# Patient Record
Sex: FEMALE | Race: WHITE | Employment: OTHER | URBAN - METROPOLITAN AREA
[De-identification: names, ages, dates, MRNs, and addresses within clinical notes are randomized per-mention and may not be internally consistent; named-entity substitution may affect disease eponyms.]

---

## 2017-01-09 ENCOUNTER — APPOINTMENT (OUTPATIENT)
Dept: MRI IMAGING | Facility: CLINIC | Age: 82
End: 2017-01-09
Attending: EMERGENCY MEDICINE
Payer: MEDICARE

## 2017-01-09 ENCOUNTER — HOSPITAL ENCOUNTER (OUTPATIENT)
Facility: CLINIC | Age: 82
Setting detail: OBSERVATION
Discharge: HOME | End: 2017-01-10
Attending: EMERGENCY MEDICINE | Admitting: INTERNAL MEDICINE
Payer: MEDICARE

## 2017-01-09 DIAGNOSIS — H53.9 VISION CHANGES: ICD-10-CM

## 2017-01-09 DIAGNOSIS — E78.2 MIXED HYPERLIPIDEMIA: Primary | ICD-10-CM

## 2017-01-09 DIAGNOSIS — I63.9 CEREBROVASCULAR ACCIDENT (CVA), UNSPECIFIED MECHANISM (H): ICD-10-CM

## 2017-01-09 LAB
ALBUMIN UR-MCNC: NEGATIVE MG/DL
ANION GAP SERPL CALCULATED.3IONS-SCNC: 6 MMOL/L (ref 3–14)
APPEARANCE UR: CLEAR
BASOPHILS # BLD AUTO: 0 10E9/L (ref 0–0.2)
BASOPHILS NFR BLD AUTO: 0.7 %
BILIRUB UR QL STRIP: NEGATIVE
BUN SERPL-MCNC: 24 MG/DL (ref 7–30)
CALCIUM SERPL-MCNC: 10.4 MG/DL (ref 8.5–10.1)
CHLORIDE SERPL-SCNC: 106 MMOL/L (ref 94–109)
CHOLEST SERPL-MCNC: 221 MG/DL
CO2 SERPL-SCNC: 29 MMOL/L (ref 20–32)
COLOR UR AUTO: YELLOW
CREAT SERPL-MCNC: 1.18 MG/DL (ref 0.52–1.04)
CRP SERPL-MCNC: <2.9 MG/L (ref 0–8)
DIFFERENTIAL METHOD BLD: NORMAL
EOSINOPHIL # BLD AUTO: 0 10E9/L (ref 0–0.7)
EOSINOPHIL NFR BLD AUTO: 0.7 %
ERYTHROCYTE [DISTWIDTH] IN BLOOD BY AUTOMATED COUNT: 13.1 % (ref 10–15)
GFR SERPL CREATININE-BSD FRML MDRD: 43 ML/MIN/1.7M2
GLUCOSE SERPL-MCNC: 96 MG/DL (ref 70–99)
GLUCOSE UR STRIP-MCNC: NEGATIVE MG/DL
HBA1C MFR BLD: 5.5 % (ref 4.3–6)
HCT VFR BLD AUTO: 40.5 % (ref 35–47)
HDLC SERPL-MCNC: 79 MG/DL
HGB BLD-MCNC: 13.6 G/DL (ref 11.7–15.7)
HGB UR QL STRIP: NEGATIVE
IMM GRANULOCYTES # BLD: 0 10E9/L (ref 0–0.4)
IMM GRANULOCYTES NFR BLD: 0.2 %
KETONES UR STRIP-MCNC: NEGATIVE MG/DL
LDLC SERPL CALC-MCNC: 127 MG/DL
LEUKOCYTE ESTERASE UR QL STRIP: NEGATIVE
LYMPHOCYTES # BLD AUTO: 1 10E9/L (ref 0.8–5.3)
LYMPHOCYTES NFR BLD AUTO: 15.6 %
MCH RBC QN AUTO: 32.1 PG (ref 26.5–33)
MCHC RBC AUTO-ENTMCNC: 33.6 G/DL (ref 31.5–36.5)
MCV RBC AUTO: 96 FL (ref 78–100)
MONOCYTES # BLD AUTO: 0.4 10E9/L (ref 0–1.3)
MONOCYTES NFR BLD AUTO: 6.7 %
MUCOUS THREADS #/AREA URNS LPF: PRESENT /LPF
NEUTROPHILS # BLD AUTO: 4.6 10E9/L (ref 1.6–8.3)
NEUTROPHILS NFR BLD AUTO: 76.1 %
NITRATE UR QL: NEGATIVE
NONHDLC SERPL-MCNC: 142 MG/DL
NRBC # BLD AUTO: 0 10*3/UL
NRBC BLD AUTO-RTO: 0 /100
PH UR STRIP: 6 PH (ref 5–7)
PLATELET # BLD AUTO: 249 10E9/L (ref 150–450)
POTASSIUM SERPL-SCNC: 4.4 MMOL/L (ref 3.4–5.3)
RBC # BLD AUTO: 4.24 10E12/L (ref 3.8–5.2)
RBC #/AREA URNS AUTO: 1 /HPF (ref 0–2)
SODIUM SERPL-SCNC: 141 MMOL/L (ref 133–144)
SP GR UR STRIP: 1.01 (ref 1–1.03)
TRIGL SERPL-MCNC: 74 MG/DL
TROPONIN I SERPL-MCNC: NORMAL UG/L (ref 0–0.04)
TSH SERPL DL<=0.005 MIU/L-ACNC: 2.61 MU/L (ref 0.4–4)
URN SPEC COLLECT METH UR: ABNORMAL
UROBILINOGEN UR STRIP-MCNC: NORMAL MG/DL (ref 0–2)
WBC # BLD AUTO: 6.1 10E9/L (ref 4–11)
WBC #/AREA URNS AUTO: <1 /HPF (ref 0–2)

## 2017-01-09 PROCEDURE — 93005 ELECTROCARDIOGRAM TRACING: CPT

## 2017-01-09 PROCEDURE — 27210995 ZZH RX 272: Performed by: EMERGENCY MEDICINE

## 2017-01-09 PROCEDURE — 99285 EMERGENCY DEPT VISIT HI MDM: CPT | Mod: 25

## 2017-01-09 PROCEDURE — 81001 URINALYSIS AUTO W/SCOPE: CPT | Performed by: EMERGENCY MEDICINE

## 2017-01-09 PROCEDURE — 87086 URINE CULTURE/COLONY COUNT: CPT | Performed by: EMERGENCY MEDICINE

## 2017-01-09 PROCEDURE — 36415 COLL VENOUS BLD VENIPUNCTURE: CPT | Performed by: INTERNAL MEDICINE

## 2017-01-09 PROCEDURE — 25500045 ZZH RX 255: Performed by: EMERGENCY MEDICINE

## 2017-01-09 PROCEDURE — 70553 MRI BRAIN STEM W/O & W/DYE: CPT

## 2017-01-09 PROCEDURE — 12000000 ZZH R&B MED SURG/OB

## 2017-01-09 PROCEDURE — 84443 ASSAY THYROID STIM HORMONE: CPT | Performed by: INTERNAL MEDICINE

## 2017-01-09 PROCEDURE — 25000128 H RX IP 250 OP 636: Performed by: INTERNAL MEDICINE

## 2017-01-09 PROCEDURE — 99220 ZZC INITIAL OBSERVATION CARE,LEVL III: CPT | Mod: AI | Performed by: INTERNAL MEDICINE

## 2017-01-09 PROCEDURE — A9585 GADOBUTROL INJECTION: HCPCS | Performed by: EMERGENCY MEDICINE

## 2017-01-09 PROCEDURE — 25000132 ZZH RX MED GY IP 250 OP 250 PS 637: Mod: GY | Performed by: EMERGENCY MEDICINE

## 2017-01-09 PROCEDURE — 86140 C-REACTIVE PROTEIN: CPT | Performed by: INTERNAL MEDICINE

## 2017-01-09 PROCEDURE — 80048 BASIC METABOLIC PNL TOTAL CA: CPT | Performed by: EMERGENCY MEDICINE

## 2017-01-09 PROCEDURE — A9270 NON-COVERED ITEM OR SERVICE: HCPCS | Mod: GY | Performed by: INTERNAL MEDICINE

## 2017-01-09 PROCEDURE — 84484 ASSAY OF TROPONIN QUANT: CPT | Performed by: INTERNAL MEDICINE

## 2017-01-09 PROCEDURE — 70544 MR ANGIOGRAPHY HEAD W/O DYE: CPT | Mod: XS

## 2017-01-09 PROCEDURE — 70549 MR ANGIOGRAPH NECK W/O&W/DYE: CPT

## 2017-01-09 PROCEDURE — A9270 NON-COVERED ITEM OR SERVICE: HCPCS | Mod: GY | Performed by: EMERGENCY MEDICINE

## 2017-01-09 PROCEDURE — 80061 LIPID PANEL: CPT | Performed by: INTERNAL MEDICINE

## 2017-01-09 PROCEDURE — 83036 HEMOGLOBIN GLYCOSYLATED A1C: CPT | Performed by: INTERNAL MEDICINE

## 2017-01-09 PROCEDURE — 25000132 ZZH RX MED GY IP 250 OP 250 PS 637: Mod: GY | Performed by: INTERNAL MEDICINE

## 2017-01-09 PROCEDURE — 85025 COMPLETE CBC W/AUTO DIFF WBC: CPT | Performed by: EMERGENCY MEDICINE

## 2017-01-09 RX ORDER — ASPIRIN 325 MG
325 TABLET ORAL ONCE
Status: COMPLETED | OUTPATIENT
Start: 2017-01-09 | End: 2017-01-09

## 2017-01-09 RX ORDER — SODIUM CHLORIDE 9 MG/ML
INJECTION, SOLUTION INTRAVENOUS CONTINUOUS
Status: DISCONTINUED | OUTPATIENT
Start: 2017-01-09 | End: 2017-01-10 | Stop reason: HOSPADM

## 2017-01-09 RX ORDER — CALCIUM CARBONATE 500(1250)
1 TABLET ORAL DAILY
Status: DISCONTINUED | OUTPATIENT
Start: 2017-01-10 | End: 2017-01-10 | Stop reason: HOSPADM

## 2017-01-09 RX ORDER — CALCIUM CARBONATE 500(1250)
500 TABLET ORAL DAILY
COMMUNITY
End: 2021-01-01 | Stop reason: DRUGHIGH

## 2017-01-09 RX ORDER — ACETAMINOPHEN 325 MG/1
650 TABLET ORAL EVERY 4 HOURS PRN
Status: DISCONTINUED | OUTPATIENT
Start: 2017-01-09 | End: 2017-01-10 | Stop reason: HOSPADM

## 2017-01-09 RX ORDER — ASPIRIN 300 MG/1
300 SUPPOSITORY RECTAL DAILY
Status: DISCONTINUED | OUTPATIENT
Start: 2017-01-10 | End: 2017-01-10

## 2017-01-09 RX ORDER — ONDANSETRON 2 MG/ML
4 INJECTION INTRAMUSCULAR; INTRAVENOUS EVERY 6 HOURS PRN
Status: DISCONTINUED | OUTPATIENT
Start: 2017-01-09 | End: 2017-01-10 | Stop reason: HOSPADM

## 2017-01-09 RX ORDER — AMOXICILLIN 250 MG
1-2 CAPSULE ORAL 2 TIMES DAILY
Status: DISCONTINUED | OUTPATIENT
Start: 2017-01-09 | End: 2017-01-10 | Stop reason: HOSPADM

## 2017-01-09 RX ORDER — ACYCLOVIR 200 MG/1
60 CAPSULE ORAL ONCE
Status: COMPLETED | OUTPATIENT
Start: 2017-01-09 | End: 2017-01-09

## 2017-01-09 RX ORDER — LABETALOL HYDROCHLORIDE 5 MG/ML
10-40 INJECTION, SOLUTION INTRAVENOUS EVERY 10 MIN PRN
Status: DISCONTINUED | OUTPATIENT
Start: 2017-01-09 | End: 2017-01-10 | Stop reason: HOSPADM

## 2017-01-09 RX ORDER — ASCORBIC ACID 500 MG
100 TABLET ORAL DAILY
Status: DISCONTINUED | OUTPATIENT
Start: 2017-01-10 | End: 2017-01-10 | Stop reason: HOSPADM

## 2017-01-09 RX ORDER — ONDANSETRON 4 MG/1
4 TABLET, ORALLY DISINTEGRATING ORAL EVERY 6 HOURS PRN
Status: DISCONTINUED | OUTPATIENT
Start: 2017-01-09 | End: 2017-01-10 | Stop reason: HOSPADM

## 2017-01-09 RX ORDER — POLYETHYLENE GLYCOL 3350 17 G/17G
17 POWDER, FOR SOLUTION ORAL DAILY PRN
Status: DISCONTINUED | OUTPATIENT
Start: 2017-01-09 | End: 2017-01-10 | Stop reason: HOSPADM

## 2017-01-09 RX ORDER — VITAMIN E 268 MG
400 CAPSULE ORAL
Status: DISCONTINUED | OUTPATIENT
Start: 2017-01-09 | End: 2017-01-10 | Stop reason: HOSPADM

## 2017-01-09 RX ORDER — GADOBUTROL 604.72 MG/ML
10 INJECTION INTRAVENOUS ONCE
Status: COMPLETED | OUTPATIENT
Start: 2017-01-09 | End: 2017-01-09

## 2017-01-09 RX ADMIN — SODIUM CHLORIDE 60 ML: 9 INJECTION, SOLUTION INTRAMUSCULAR; INTRAVENOUS; SUBCUTANEOUS at 17:01

## 2017-01-09 RX ADMIN — ASPIRIN 325 MG ORAL TABLET 325 MG: 325 PILL ORAL at 17:03

## 2017-01-09 RX ADMIN — VITAMIN E CAP 400 UNIT 400 UNITS: 400 CAP at 20:26

## 2017-01-09 RX ADMIN — GADOBUTROL 10 ML: 604.72 INJECTION INTRAVENOUS at 17:00

## 2017-01-09 RX ADMIN — PSYLLIUM HUSK 1 PACKET: 3.4 POWDER ORAL at 19:50

## 2017-01-09 RX ADMIN — SENNOSIDES AND DOCUSATE SODIUM 2 TABLET: 8.6; 5 TABLET ORAL at 20:26

## 2017-01-09 RX ADMIN — SODIUM CHLORIDE: 9 INJECTION, SOLUTION INTRAVENOUS at 19:40

## 2017-01-09 ASSESSMENT — VISUAL ACUITY
OU: BLURRED VISION
OU: NORMAL ACUITY

## 2017-01-09 ASSESSMENT — ENCOUNTER SYMPTOMS
WEAKNESS: 1
NUMBNESS: 1

## 2017-01-09 NOTE — IP AVS SNAPSHOT
MRN:[id number]                      After Visit Summary   1/9/2017    Rosie Guerrero    MRN: [id number]           Thank you!     Thank you for choosing [facility] for your care. Our goal is always to provide you with excellent care. Hearing back from our patients is one way we can continue to improve our services. Please take a few minutes to complete the written survey that you may receive in the mail after you visit with us. Thank you!        Patient Information     Date Of Birth          5/25/1928        About your hospital stay     You were admitted on:  January 9, 2017 You last received care in the:  [facility] [address] [facility]    You were discharged on:  January 10, 2017        Reason for your hospital stay       Evaluation of your vision changes and weakness which was found to be secondary to a stroke.                  Who to Call     For medical emergencies, please call 911.  For non-urgent questions about your medical care, please call your primary care provider or clinic, [phone]          Attending Provider     Provider    Nikia Alexis MD Farbakhsh, Kambiz, MD White, Kirsten Elizabeth,        Primary Care Provider Office Phone # Fax #    Drew Wale Hall [phone]       [facility] [address] [address]        After Care Instructions     Activity       Your activity upon discharge: activity as tolerated            Diet       Follow this diet upon discharge: Regular                  Follow-up Appointments     Follow-up and recommended labs and tests        1. Follow up with PCP in 1-2 weeks. No labs needed.            Follow-up and recommended labs and tests        Followup with Neurology in 4 weeks  With Sammi Martinez NP   Tuesday Feb 7 at 1:30pm    [facility]  [address] [place] [address], [address]   [place] [address] [phone]                  Your next 10 appointments already scheduled      Jan 19, 2017 11:45 AM   SHORT with Drew Hall MD   [facility] ([facility])    [address] [address] [phone]              Additional Services     Home Care OT Referral for Hospital Discharge       OT to eval and treat    Your provider has ordered home care - occupational therapy. If you have not been contacted within 2 days of your discharge please call the department phone number listed on the top of this document.            Home Care PT Referral for Hospital Discharge       PT to eval and treat    Your provider has ordered home care - physical therapy. If you have not been contacted within 2 days of your discharge please call the department phone number listed on the top of this document.            Home care nursing referral       RN extended hours visit. RN to assess vital signs and weight, respiratory and cardiac status and home safety.    Your provider has ordered home care nursing services. If you have not been contacted within 2 days of your discharge please call the inpatient department phone number at [phone] .                  Further instructions from your care team       Your doctor has ordered home care to help you after your hospital stay.  They will contact you regarding your first visit.  This service will be provided by [facility].  If you have not received a call within 48 hours of discharge, please call them at [phone].    A hospital follow-up appointment has been scheduled for you.    It is important to go to this appointment--bring this paperwork with you.  At this visit, you should discuss your hospital stay--tell your primary care doctor about how your symptoms are doing, and if there were any medication changes made.  Your doctor will make sure you are still doing OK and update the records at their clinic.       Make sure to see your eye doctor tomorrow or the next day at the latest.   You should be seen in TIA clinic by neurology in the next 1-3 days as well.  If you have any worsening of symptoms please return to the emergency department      Your risk factors for stroke or TIA (transient ischemic attack):    Your Risk Factors Your Results Normal Ranges   High blood pressure BP Readings from Last 1 Encounters:   01/10/17 165/77    Less than 120/80   Cholesterol              Total CHOL      221   1/9/2017   Less than 150    Triglycerides   TRIG       74   1/9/2017 Less than 150   LDL LDL      127   1/9/2017    Less than 70   HDL HDL       79   1/9/2017         Greater than 40 (men)  Greater than 50 (women)   Diabetes   Recent Labs  Lab 01/10/17  0715   GLC 92    Fasting blood glucose    Smoking/tobacco use  Quit smoking and tobacco   Overweight  Lose 1-2 pounds a week   Lack of exercise  30 minutes moderate activity each day   Other risk factors include carotid (neck) artery disease, atrial fibrillation and stress. You may be on new medicine to treat high blood pressure, cholesterol, diabetes or atrial fibrillation.    Understanding Stroke Booklet given to patient. Please refer to booklet for further information.    Stroke warning signs and symptoms - CALL 911 right away for:  - Sudden numbness or weakness in the face, arm or leg (often on one side of the body).  - Sudden confusion or trouble understanding what is going on.  - Sudden blurred or decreased vision in one or both eyes.  - Sudden trouble speaking, loss of balance, dizziness or problems with coordination.  - Sudden, severe headache for no reason.  - Fainting or seizures.  - Symptoms may go away then come back suddenly.      Pending Results     Date and Time Order Name Status Description    1/9/2017 1405 Urine Culture Preliminary             Statement of Approval     Ordered          01/10/17 1132  I have reviewed and agree with all the recommendations and orders detailed in this document.   EFFECTIVE NOW     Approved and  "electronically signed by:  Angelica Briggs DO             Admission Information        Provider Department Dept Phone    2017 Angelica Briggs DO [facility] [phone]      Your Vitals Were     Blood Pressure Temperature Respirations Weight Pulse Oximetry       165/77 mmHg 98.1  F (36.7  C) (Oral) 16 45.7 kg (100 lb 12 oz) 98%       MyChart Information     [facility] lets you send messages to your doctor, view your test results, renew your prescriptions, schedule appointments and more. To sign up, go to [email or web address] . Click on \"Log in\" on the left side of the screen, which will take you to the Welcome page. Then click on \"Sign up Now\" on the right side of the page.     You will be asked to enter the access code listed below, as well as some personal information. Please follow the directions to create your username and password.     Your access code is: [email or web address]  Expires: 2017  2:34 PM     Your access code will  in 90 days. If you need help or a new code, please call your [facility] clinic or [phone].        Care EveryWhere ID     This is your Care EveryWhere ID. This could be used by other organizations to access your [facility] medical records  [id number]           Review of your medicines      START taking        Dose / Directions    aspirin 81 MG EC tablet   Used for:  Cerebrovascular accident (CVA), unspecified mechanism (H)        Dose:  81 mg   Take 1 tablet (81 mg) by mouth daily   Quantity:  90 tablet   Refills:  0         CONTINUE these medicines which have NOT CHANGED        Dose / Directions    ascorbic acid 500 MG tablet   Commonly known as:  VITAMIN C        Take by mouth daily   Quantity:  30 tablet   Refills:  0       calcium carbonate 500 MG tablet   Commonly known as:  OS-PATRICIA 500 mg [place]. Ca        Dose:  500 mg   Take 500 mg by mouth daily   Refills:  0       coenzyme Q-10 10 MG Caps        Quantity:  30 capsule   Refills:  0       DAILY " MULTIPLE VITAMINS Tabs        naturemade mutli + Lutein daily   Refills:  0       FISH -[id number] MG Caps        1 dailoy   Refills:  0       ginkgo biloba 60 MG Caps capsule        Dose:  60 mg   Take 1 capsule (60 mg) by mouth daily   Quantity:  60 capsule   Refills:  0       magnesium chloride 535 (64 MG) MG Tbcr CR tablet        Dose:  535 mg   Take 535 mg by mouth daily   Refills:  0       NATRUL COLON CARE PO        Take by mouth daily   Refills:  0       prune juice Liqd        Dose:  5 mL   Take 5 mLs by mouth daily as needed for constipation   Refills:  0       vitamin B complex with vitamin C Tabs tablet        Dose:  1 tablet   Take 1 tablet by mouth daily   Refills:  0       vitamin D 400 UNITS capsule        1 CAPSULE DAILY   Quantity:  30   Refills:  0       vitamin E 400 UNIT capsule        ONE CAPSULE DAILY   Quantity:  3 MONTHS   Refills:  1 YEAR       Zinc 15 MG Caps        Dose:  15 mg   Take 15 mg by mouth daily   Refills:  0            Where to get your medicines      These medications were sent to [facility] [place]  [place] - [address]  [address] [facility] [address], [facility] [address]     Phone:  [phone]    - aspirin 81 MG EC tablet             Protect others around you: Learn how to safely use, store and throw away your medicines at www.disposemymeds.org.             Medication List: This is a list of all your medications and when to take them. Check marks below indicate your daily home schedule. Keep this list as a reference.      Medications           Morning Afternoon Evening Bedtime As Needed    ascorbic acid 500 MG tablet   Commonly known as:  VITAMIN C   Take by mouth daily   Last time this was given:  500 mg on 1/10/2017  9:03 AM                                   aspirin 81 MG EC tablet   Take 1 tablet (81 mg) by mouth daily   Last time this was given:  325 mg on 1/10/2017  9:03 AM                                   calcium carbonate 500 MG tablet   Commonly  known as:  OS-PATRICIA 500 mg [place]. Ca   Take 500 mg by mouth daily   Last time this was given:  500 mg on 1/10/2017  9:03 AM                                   coenzyme Q-10 10 MG Caps                                DAILY MULTIPLE VITAMINS Tabs   naturemade mutli + Lutein daily                                FISH -[id number] MG Caps   1 dailoy                                ginkgo biloba 60 MG Caps capsule   Take 1 capsule (60 mg) by mouth daily                                magnesium chloride 535 (64 MG) MG Tbcr CR tablet   Take 535 mg by mouth daily   Last time this was given:  535 mg on 1/10/2017  9:03 AM                                NATRUL COLON CARE PO   Take by mouth daily                                prune juice Liqd   Take 5 mLs by mouth daily as needed for constipation                                vitamin B complex with vitamin C Tabs tablet   Take 1 tablet by mouth daily   Last time this was given:  1 tablet on 1/10/2017  9:03 AM                                   vitamin D 400 UNITS capsule   1 CAPSULE DAILY                                vitamin E 400 UNIT capsule   ONE CAPSULE DAILY   Last time this was given:  400 Units on 1/9/2017  8:26 PM                                   Zinc 15 MG Caps   Take 15 mg by mouth daily

## 2017-01-09 NOTE — ED NOTES
[facility]  ED Nurse Handoff Report    ED Chief complaint: Loss of Vision      ED Diagnosis:   Final diagnoses:   Vision changes   Cerebrovascular accident (CVA), unspecified mechanism (H)       Code Status: Full Code    Allergies:   Allergies   Allergen Reactions     Lipitor [Hmg-Coa-R Inhibitors]      Severe leg cramps     Penicillin [Penicillins]      Sulfa Drugs        Activity level:  Stand with Assist     Needed?: No    Isolation: No  Infection: Not Applicable    Bariatric?: No      Vital Signs:   Filed Vitals:    01/09/17 1400 01/09/17 1415 01/09/17 1430 01/09/17 1701   BP: 143/65  142/69 148/70   Temp:       TempSrc:       Resp:       Weight:       SpO2: 97% 97% 98% 97%       Cardiac Rhythm: ,        Pain level: 0-10 Pain Scale: 0    Is this patient confused?: No    Patient Report: Initial Complaint: Loss of right peripheral vision  Focused Assessment: Patient with  History of macular degeneration, had right peripheral vision loss for last few days with some extremities numbness and tingling, as well as left facial tingling. Did not seek any medical helping thinking it would go away.   Tests Performed: Labs, MRI  Abnormal Results: MRI  Treatments provided: Aspirn    Family Comments: Pt here alone.    OBS brochure/video discussed/provided to patient: N/A    ED Medications:   Medications   gadobutrol (GADAVIST) injection 10 mL (10 mLs Intravenous Given 1/9/17 1700)   sodium chloride bacteriostatic 0.9 % flush 60 mL (60 mLs Intravenous Given 1/9/17 1701)   aspirin tablet 325 mg (325 mg Oral Given 1/9/17 1703)       Drips infusing?:  No      ED NURSE PHONE NUMBER: [phone]

## 2017-01-09 NOTE — IP AVS SNAPSHOT
[facility]    [address]    [place] [address]    Phone:  [phone]                                       After Visit Summary   1/9/2017    Rosie Guerrero    MRN: [id number]           After Visit Summary Signature Page     I have received my discharge instructions, and my questions have been answered. I have discussed any challenges I see with this plan with the nurse or doctor.    ..........................................................................................................................................  Patient/Patient Representative Signature      ..........................................................................................................................................  Patient Representative Print Name and Relationship to Patient    ..................................................               ................................................  Date                                            Time    ..........................................................................................................................................  Reviewed by Signature/Title    ...................................................              ..............................................  Date                                                            Time

## 2017-01-09 NOTE — ED NOTES
Bed: ED22  Expected date:   Expected time:   Means of arrival:   Comments:  89 - 29F vision problems eta 1230

## 2017-01-09 NOTE — PHARMACY-ADMISSION MEDICATION HISTORY
Admission medication history interview status for the 1/9/2017  admission is complete. See EPIC admission navigator for prior to admission medications     Medication history source reliability:Good    Actions taken by pharmacist (provider contacted, etc):None     Additional medication history information not noted on PTA med list :None    Medication reconciliation/reorder completed by provider prior to medication history? No    Time spent in this activity: 20min    Prior to Admission medications    Medication Sig Last Dose Taking? Auth Provider   aspirin  MG EC tablet Take 1 tablet (325 mg) by mouth daily  Yes Nikia Alexis MD   calcium carbonate (OS-PATRICIA 500 MG [place]. CA) 500 MG tablet Take 500 mg by mouth daily 1/9/2017 at 1200 Yes Unknown, Entered By History   magnesium chloride 535 (64 MG) MG TBCR CR tablet Take 535 mg by mouth daily 1/9/2017 at 1200 Yes Unknown, Entered By History   Psyllium (NATRUL COLON CARE PO) Take by mouth daily 1/8/2017 at 1800 Yes Unknown, Entered By History   prune juice LIQD Take 5 mLs by mouth daily as needed for constipation 1/8/2017 at Unknown time Yes Unknown, Entered By History   Zinc 15 MG CAPS Take 15 mg by mouth daily 1/8/2017 at 1800 Yes Unknown, Entered By History   vitamin  B complex with vitamin C (VITAMIN  B COMPLEX) TABS Take 1 tablet by mouth daily 1/9/2017 at am Yes Drew Hall MD   coenzyme Q-10 10 MG CAPS  1/8/2017 at 1800 Yes Drew Hall MD   ascorbic acid (VITAMIN C) 500 MG tablet Take by mouth daily 1/9/2017 at am Yes Drew Hall MD   ginkgo biloba 60 MG CAPS Take 1 capsule (60 mg) by mouth daily 1/9/2017 at 1200 Yes Drew Hall MD   DAILY MULTIPLE VITAMINS OR TABS naturemade mutli + Lutein daily 1/9/2017 at am Yes Drew Hall MD   VITAMIN D 400 UNIT OR CAPS 1 CAPSULE DAILY 1/9/2017 at 1200 Yes Drew Hall MD   VITAMIN E 400 UNIT OR CAPS ONE CAPSULE DAILY 1/8/2017 at 1800 Yes Drew Hall MD    FISH -[id number] MG OR CAPS 1 dailoy 1/9/2017 at am Yes Drew Hall MD

## 2017-01-09 NOTE — ED PROVIDER NOTES
History     Chief Complaint:  Loss of Vision    HPI   Rosie Guerrero is a 88 year old female with a history of macular degeneration who presents to the emergency department for evaluation of blurred vision. The patient states she had a loss of vision in her left peripheral field about five days ago. She thought the vision problem would get better, so she waited to present to the ED.  She also complains of some numbness and tingling in her fingers, arms and back, which she has not experienced before. She also notes some generalized weakness in her legs. She lives independently.    Allergies:  Lipitor  Penicillin  Sulfa Drugs    Medications:    Vitamin B  Coenzyme Q-10  Vitamin C  Ginko Biloba  Hydrocortisone  Multivitamins  Calcium-Magnesium-Zinc  Vitamin D  Vitamin E  Potassium Gluconate  Fish Oil     Past Medical History:    Malignant neoplasm of skin of trunk  Macular degeneration  Osteoarthrosis  Rubella without complication  Hyperlipidemia    Past Surgical History:    Hysterectomy, fuad  Removal tonsils/adenoids  Colonoscopy    Family History:    Cerebrovascular disease- mother, father    Social History:  The patient was in the ED alone.  Smoking Status: Former smoker (quit 1974)  Alcohol Use: Occasionally  Marital Status:     The patient lives independently     Review of Systems   Eyes:        Blurred vision   Neurological: Positive for weakness and numbness.   10 point review of systems performed and is negative except as above and in HPI.    Physical Exam   First Vitals:  BP: 140/73 mmHg  Heart Rate: 67  Temp: 98.3  F (36.8  C)  Resp: 14  Weight: 50.349 kg (111 lb)  SpO2: 97 %      Physical Exam  General: Resting on the gurney, appears comfortable  Head:  The scalp, face, and head appear normal  Mouth/Throat: Mucus membranes are moist  CV:  Regular rate    Normal S1 and S2  No pathological murmur   Resp:  Breath sounds clear and equal bilaterally    Non-labored, no retractions or accessory muscle  use    No coarseness    No wheezing   GI:  Abdomen is soft, no rigidity    No tenderness to palpation  MS:  Normal motor assessment of all extremities.    Good capillary refill noted.  Skin:  No rash or lesions noted.  Neuro:  Cranial nerves II through XII intact.    On examination, the patient had slightly blurred vision (equal in both eyes).    Sensation intact.    Normal strength x 4.    Speech is normal and fluent. No apparent deficit.  Psych:  Awake. Alert.  Normal affect.      Appropriate interactions.    Emergency Department Course     Imaging:  Radiographic findings were communicated with the patient who voiced understanding of the findings.    MR Head w/o Contrast Angiogram:  IMPRESSION:  1. Moderate to high-grade short segment stenosis right P1 segment.  2. High-grade stenosis or short segment occlusion of proximal portion  of a right M2 branch.  Per Radiology.    Laboratory:  CBC: WBC 6.1, HGB 13.6,   BMP: Creatinine 1.18(H), GFR 43(L), Calcium 10.4(H)    UA with Microscopic: Mucous present o/w Negative  Urine culture: In process    Emergency Department Course:  Nursing notes and vitals reviewed.  I performed an exam of the patient as documented above.     1427 I spoke with Dr. Herman in Neurology regarding the patient.    Findings and plan explained to the patient. Patient discharged home with instructions regarding supportive care, medications, and reasons to return. The importance of close follow-up was reviewed. The patient was prescribed Aspirin.      Impression & Plan    Medical Decision Making:  Rosie Guerrero is an 88 year old female who presents to the emergency department with a complaint of vision changes. This has been going on long enough that she would be outside of a TPA window for stroke. Therefore, MRI was obtained. MRI showed moderate to high grade stenosis in her right P1 and well as high grade stenosis vs. occlusion in a proximal portion of her right P2. Additionally there are  multiple acute occipital strokes seen on MRI.  The patient was discussed with Dr. Herman and will be admitted to internal medicine in a neurology bed.    Diagnosis:  (H53.9) Vision changes    Disposition:  The patient was discharged home.    Discharge Medications:  New Prescriptions    ASPIRIN  MG EC TABLET    Take 1 tablet (325 mg) by mouth daily     1/9/2017    EMERGENCY DEPARTMENT    I, Meg Mendez, am serving as a scribe at 1349 on January 9, 2017  to document services personally performed by Dr. Alexis based on my observations and the provider's statements to me.      Nikia Alexis MD  01/09/17 7175

## 2017-01-09 NOTE — IP AVS SNAPSHOT
[place] [address] [facility]: [phone]                                              INTERAGENCY TRANSFER FORM - PHYSICIAN ORDERS   2017                    Hospital Admission Date: 2017  COLLEEN DAVIS   : 1928  Sex: Female        Attending Provider: Angelica Briggs DO     Allergies:  Lipitor, Penicillin, Sulfa Drugs    Infection:  None   Service:  HOSPITALIST    Ht:  --   Wt:  45.7 kg (100 lb 12 oz)   Admission Wt:  50.349 kg (111 lb)    BMI:  16.5 kg/m 2   BSA:  1.45 m 2            Patient PCP Information     Provider PCP Type    Drew Hall MD General      ED Clinical Impression     Diagnosis Description Comment Added By Time Added    Vision changes [H53.9] Vision changes [H53.9]  Nikia Alexis MD 2017  4:20 PM    Cerebrovascular accident (CVA), unspecified mechanism (H) [I63.9] Cerebrovascular accident (CVA), unspecified mechanism (H) [I63.9]  Nikia Alexis MD 2017  5:21 PM      Hospital Problems as of 1/10/2017              Priority Class Noted POA    CVA (cerebral vascular accident) (H) Medium  2017 Yes    Stroke (H) Medium  1/10/2017 Yes      Non-Hospital Problems as of 1/10/2017              Priority Class Noted    Hyperlipidemia   Unknown    Generalized osteoarthrosis, unspecified site   Unknown    Macular degeneration (senile) of retina   Unknown    HYPERLIPIDEMIA LDL GOAL <100   10/31/2010    Advanced directives, counseling/discussion   2014      Code Status History     Date Active Date Inactive Code Status Order ID Comments User Context    10/1/2014  9:45 AM 2017  7:00 PM Full Code [id number] See Health Care Directive dated 2014.  NANCY Cole Jane, LPN Outpatient         Medication Review      START taking        Dose / Directions Comments    aspirin 81 MG EC tablet   Used for:  Cerebrovascular accident (CVA), unspecified mechanism (H)        Dose:  81 mg   Take 1 tablet (81 mg) by mouth daily    Quantity:  90 tablet   Refills:  0          CONTINUE these medications which have NOT CHANGED        Dose / Directions Comments    ascorbic acid 500 MG tablet   Commonly known as:  VITAMIN C        Take by mouth daily   Quantity:  30 tablet   Refills:  0        calcium carbonate 500 MG tablet   Commonly known as:  OS-PATRICIA 500 mg [place]. Ca        Dose:  500 mg   Take 500 mg by mouth daily   Refills:  0        coenzyme Q-10 10 MG Caps        Quantity:  30 capsule   Refills:  0        DAILY MULTIPLE VITAMINS Tabs        naturemade mutli + Lutein daily   Refills:  0        FISH -[id number] MG Caps        1 dailoy   Refills:  0        ginkgo biloba 60 MG Caps capsule        Dose:  60 mg   Take 1 capsule (60 mg) by mouth daily   Quantity:  60 capsule   Refills:  0        magnesium chloride 535 (64 MG) MG Tbcr CR tablet        Dose:  535 mg   Take 535 mg by mouth daily   Refills:  0        NATRUL COLON CARE PO        Take by mouth daily   Refills:  0        prune juice Liqd        Dose:  5 mL   Take 5 mLs by mouth daily as needed for constipation   Refills:  0        vitamin B complex with vitamin C Tabs tablet        Dose:  1 tablet   Take 1 tablet by mouth daily   Refills:  0        vitamin D 400 UNITS capsule        1 CAPSULE DAILY   Quantity:  30   Refills:  0        vitamin E 400 UNIT capsule        ONE CAPSULE DAILY   Quantity:  3 MONTHS   Refills:  1 YEAR        Zinc 15 MG Caps        Dose:  15 mg   Take 15 mg by mouth daily   Refills:  0                  Further instructions from your care team       Your doctor has ordered home care to help you after your hospital stay.  They will contact you regarding your first visit.  This service will be provided by [facility] [place].  If you have not received a call within 48 hours of discharge, please call them at [phone].      Make sure to see your eye doctor tomorrow or the next day at the latest.  You should be seen in TIA clinic by neurology in the next 1-3  days as well.  If you have any worsening of symptoms please return to the emergency department    Summary of Visit     Reason for your hospital stay       Evaluation of your vision changes and weakness which was found to be secondary to a stroke.             After Care     Activity       Your activity upon discharge: activity as tolerated       Diet       Follow this diet upon discharge: Regular             Referrals     Home Care OT Referral for Hospital Discharge       OT to eval and treat    Your provider has ordered home care - occupational therapy. If you have not been contacted within 2 days of your discharge please call the department phone number listed on the top of this document.       Home Care PT Referral for Hospital Discharge       PT to eval and treat    Your provider has ordered home care - physical therapy. If you have not been contacted within 2 days of your discharge please call the department phone number listed on the top of this document.       Home care nursing referral       RN extended hours visit. RN to assess vital signs and weight, respiratory and cardiac status and home safety.    Your provider has ordered home care nursing services. If you have not been contacted within 2 days of your discharge please call the inpatient department phone number at [phone] .              MD face to face encounter       Documentation of Face to Face and Certification for Home Health Services    I certify that patient: Rosie Guerrero is under my care and that I, or a nurse practitioner or physician's assistant working with me, had a face-to-face encounter that meets the physician face-to-face encounter requirements with this patient on: 1/10/2017.    This encounter with the patient was in whole, or in part, for the following medical condition, which is the primary reason for home health care: acute ischemic stroke with resulting vision changes and LE weakness, macular degeneration, arthritis.    I  certify that, based on my findings, the following services are medically necessary home health services: Nursing, Occupational Therapy and Physical Therapy.    My clinical findings support the need for the above services because: Nurse is needed: to help with medication compliance and VS checks after recent stroke. Occupational Therapy Services are needed to assess and treat cognitive ability and address ADL safety due to impairment in deficits resulting from acute ischemic stroke. Physical Therapy Services are needed to assess and treat the following functional impairments: deficits resulting from acute ischemic stroke.    Further, I certify that my clinical findings support that this patient is homebound (i.e. absences from home require considerable and taxing effort and are for medical reasons or [employer] services or infrequently or of short duration when for other reasons) because: Leaving home is medically contraindicated for the following reason(s): unable to ambulate for >100ft w/o rest.    Based on the above findings. I certify that this patient is confined to the home and needs intermittent skilled nursing care, physical therapy and/or speech therapy.  The patient is under my care, and I have initiated the establishment of the plan of care.  This patient will be followed by a physician who will periodically review the plan of care.  Physician/Provider to provide follow up care: Drew Hall    Attending hospital physician (the Medicare certified [facility] provider): Angelica Briggs  Physician Signature: See electronic signature associated with these discharge orders.  Date: 1/10/2017                  Follow-Up Appointment Instructions     Future Labs/Procedures    Follow-up and recommended labs and tests      Comments:    1. Follow up with PCP in 1-2 weeks. No labs needed.      Follow-Up Appointment Instructions     Follow-up and recommended labs and tests        1. Follow up with PCP in 1-2  weeks. No labs needed.             Statement of Approval     Ordered          01/10/17 1132  I have reviewed and agree with all the recommendations and orders detailed in this document.   EFFECTIVE NOW     Approved and electronically signed by:  Angelica Briggs DO

## 2017-01-09 NOTE — IP AVS SNAPSHOT
` `     [facility]: [phone]                 INTERAGENCY TRANSFER FORM - NOTES (H&P, Discharge Summary, Consults, Procedures, Therapies)   2017                    Hospital Admission Date: 2017  ROSIE GUERRERO   : 1928  Sex: Female        Patient PCP Information     Provider PCP Type    Drew Hall MD General         History & Physicals      H&P by Joe Hauser MD at 2017  6:08 PM     Author:  Joe Hauser MD Service:  Hospitalist Author Type:  Physician    Filed:  2017  7:35 PM Note Time:  2017  6:08 PM Status:  Addendum    :  Joe Hauser MD (Physician)      Related Notes: Original Note by Joe Hauser MD (Physician) filed at 2017  6:21 PM         [facility]    History and Physical  Hospitalist       Date of Admission:  2017  Date of Service (when I saw the patient): 2017    Assessment and Plan  Rosie Guerrero is a 88 year old female who presents with blurred vision and nonspecific weakness.  MRI of the brain shows acute stroke in parieto-occipital area.  Neurology has been consulted.  The patient has been started on aspirin.  The onset of symptoms was five days ago and in fact is getting better today.  1.  Admit to telemetry under acute CVA  2.  Acute stroke no indication for thrombolytic  3. Obtain cardiac echo  4.  Start aspirin  5. Await neurology's recommendation  6. The patient specifically asked to be full code and all life saving measure including Defibrillation and Intubation to be done at this moment    Summary:  88 -year-old female presented to the hospital with five days history of blurry vision and nonspecific weakness.  MRI of the brain showed acute parieto-occipital ischemic stroke that she also has some narrowing of vessel in MRA of the brain (see attached report).  She is being admitted for telemetry and under acute distress protocol.    DVT Prophylaxis: Low  Risk/Ambulatory with no VTE prophylaxis indicated  Code Status: Full Code    Disposition: Expected discharge in 1-2 days once evaluated by neurology.    Joe Hauser MD    Primary Care Physician  Drew Hall    Chief Complaint  Blurred vision    History is obtained from the patient    History of Present Illness  Rosie Guerrero is a 88 year old female who presents with blurred vision.  She states that woke up five days ago with blurry vision and feeling that the objects were moving..  She also states that her vision was fuzzy.  Four nights ago she was not able to play cards as usual and was weak in lower extremity and ad some unsteadiness in her gait. Her symptoms have been improving today  But the staff at care facility decided to send her to the emergency room for further evaluation. she denies having any chest pain, no shortness of breath no nausea, no vomiting, no dysuria,no frequency, no urinary urgency.  All other review of system besides what I mentioned are negative.    Past Medical History   I have reviewed this patient's medical history and updated it with pertinent information if needed.   Past Medical History   Diagnosis Date     Other malignant neoplasm of skin of trunk, except scrotum      Multiple Basal cell CA excisions     Other and unspecified hyperlipidemia      Macular degeneration (senile) of retina, unspecified      Generalized osteoarthrosis, unspecified site      Hx of cervicalgia, and sciatica, resolved with PT     Rubella without mention of complication      Past history of Rubella       Past Surgical History  I have reviewed this patient's surgical history and updated it with pertinent information if needed.  Past Surgical History   Procedure Laterality Date     Hysterectomy, fuad  1970     With BSO, secondary to fibroids     Hc remove tonsils/adenoids,<11 y/o  1934     Hc colonoscopy thru stoma, diagnostic  1994     NORMAL     Flex sig (medicare pt.)  1996     NORMAL       Prior  to Admission Medications  Prior to Admission Medications   Prescriptions Last Dose Informant Patient Reported? Taking?   DAILY MULTIPLE VITAMINS OR TABS 2017 at am  Yes Yes   Sig: naturemade mutli + Lutein daily   FISH -[id number] MG OR CAPS 2017 at am  Yes Yes   Si dailoy   Psyllium (NATRUL COLON CARE PO) 2017 at 1800  Yes Yes   Sig: Take by mouth daily   VITAMIN D 400 UNIT OR CAPS 2017 at 1200  Yes Yes   Si CAPSULE DAILY   VITAMIN E 400 UNIT OR CAPS 2017 at 1800  Yes Yes   Sig: ONE CAPSULE DAILY   Zinc 15 MG CAPS 2017 at 1800  Yes Yes   Sig: Take 15 mg by mouth daily   ascorbic acid (VITAMIN C) 500 MG tablet 2017 at am  Yes Yes   Sig: Take by mouth daily   calcium carbonate (OS-PATRICIA 500 MG [place]. [place]) 500 MG tablet 2017 at 1200  Yes Yes   Sig: Take 500 mg by mouth daily   coenzyme Q-10 10 MG CAPS 2017 at 1800  Yes Yes   ginkgo biloba 60 MG CAPS 2017 at 1200  Yes Yes   Sig: Take 1 capsule (60 mg) by mouth daily   magnesium chloride 535 (64 MG) MG TBCR CR tablet 2017 at 1200  Yes Yes   Sig: Take 535 mg by mouth daily   prune juice LIQD 2017 at Unknown time  Yes Yes   Sig: Take 5 mLs by mouth daily as needed for constipation   vitamin  B complex with vitamin C (VITAMIN  B COMPLEX) TABS 2017 at am  Yes Yes   Sig: Take 1 tablet by mouth daily      Facility-Administered Medications: None     Allergies  Allergies   Allergen Reactions     Lipitor [Hmg-Coa-R Inhibitors]      Severe leg cramps     Penicillin [Penicillins]      Sulfa Drugs        Social History  I have reviewed this patient's social history and updated it with pertinent information if needed. Rosie Guerrero  reports that she quit smoking about 43 years ago. She does not have any smokeless tobacco history on file. She reports that she drinks alcohol. She reports that she does not use illicit drugs.    Family History  I have reviewed this patient's family history and updated it with pertinent  information if needed.   Family History   Problem Relation Age of Onset     Neurologic Disorder Mother      Cerebrovascular disease, dementia,  age 97     CEREBROVASCULAR DISEASE Father       age 79       Review of Systems  The 10 point Review of Systems is negative other than noted in the HPI .    Physical Exam  Temp: 98.3  F (36.8  C) Temp src: Oral BP: 148/70 mmHg   Heart Rate: 67 Resp: 14 SpO2: 97 % O2 Device: None (Room air)    Vital Signs with Ranges  Temp:  [98.3  F (36.8  C)] 98.3  F (36.8  C)  Heart Rate:  [67-68] 67  Resp:  [14] 14  BP: (140-152)/(63-73) 148/70 mmHg  SpO2:  [94 %-98 %] 97 %  111 lbs 0 oz    Constitutional: awake, alert, cooperative, no apparent distress, and appears stated age  Eyes: Lids and lashes normal, pupils equal, round and reactive to light, extra ocular muscles intact, sclera clear, conjunctiva normal and Her vision is blurred but does not seem to have any problem with acuity or double vision. Strikingly her Visual examination was unremarkable.  ENT: Normocephalic, without obvious abnormality, atraumatic, sinuses nontender on palpation, external ears without lesions, oral pharynx with moist mucous membranes, tonsils without erythema or exudates, gums normal and good dentition.  Hematologic / Lymphatic: no cervical lymphadenopathy  Respiratory: No increased work of breathing, good air exchange, clear to auscultation bilaterally, no crackles or wheezing  Cardiovascular: Normal apical impulse, regular rate and rhythm, normal S1 and S2, no S3 or S4, and no murmur noted  GI: No scars, normal bowel sounds, soft, non-distended, non-tender, no masses palpated, no hepatosplenomegally  Skin: There is macular 1 inch lesion on anterior right chest, no bruising or bleeding, normal skin color, texture, turgor and no redness, warmth, or swelling  Musculoskeletal: There is no redness, warmth, or swelling of the joints.  Full range of motion noted.  Motor strength is 5 out of 5 all  extremities bilaterally.  Tone is normal.  Neurologic: Awake, alert, oriented to name, place and time.  Cranial nerves II-XII are grossly intact.  Motor is 5 out of 5 bilaterally.  Cerebellar finger to nose, heel to shin intact.  Sensory is intact.  Babinski down going, Romberg negative, and gait is normal.  Mental Status Exam:  Level of Alertness:   awake  Orientation:   person, place, time  Memory:   normal  Attention/Concentration:  normal  Cranial Nerves:  cranial nerves II-XII are grossly intact  Motor Exam:  moves all extremities well and symmetrically  Neuropsychiatric: General: normal, calm and normal eye contact    Data  Data reviewed today: I have personally reviewed all of her lab, EKG and MRI and MRA report    Recent Labs  Lab 01/09/17  1309   WBC 6.1   HGB 13.6   MCV 96         POTASSIUM 4.4   CHLORIDE 106   CO2 29   BUN 24   CR 1.18*   ANIONGAP 6   PATRICIA 10.4*   GLC 96       Recent Results (from the past 24 hour(s))   MR Head w/o Contrast Angiogram    Narrative    MRA ANGIOGRAM HEAD WITHOUT CONTRAST 1/9/2017 3:56 PM    HISTORY:  Vision changes.    COMPARISON: None.    TECHNIQUE: Routine [place] MRA.    FINDINGS: There is a short segment moderate-to-severe stenosis of the  right P1 segment of the posterior cerebral artery. There is a  high-grade stenosis or short segment occlusion of a right M2 branch  beginning at its origin and extending 5 or 6 mm. There is flow  distally. Left middle cerebral artery complex appears normal. Both  anterior cerebral arteries appear normal. There is a dominant right  vertebral artery with small caliber but patent left vertebral artery.  This is likely developmental. No evidence for aneurysm.      Impression    IMPRESSION:  1. Moderate to high-grade short segment stenosis right P1 segment.  2. High-grade stenosis or short segment occlusion of proximal portion  of a right M2 branch.    RUDI MORGAN MD   MR Brain w/o & w Contrast    Narrative    MRI  BRAIN WITHOUT AND WITH CONTRAST  1/9/2017 4:57 PM    HISTORY:  Vision changes.     TECHNIQUE:  Multiplanar, multisequence MRI of the brain without and  with 10 mL Gadavist.    COMPARISON: None.    FINDINGS: Diffusion-weighted images show several areas of restricted  diffusion in the right parieto-occipital lobe with the largest area  measuring approximately 4 x 2.5 cm. These are consistent with acute  ischemic infarcts. There is no evidence for any intracranial  hemorrhage or any significant mass effect. Cerebral atrophy is  present. Postcontrast images show some minimal hyperemia in the area  of acute ischemic infarction, but otherwise no enhancement. There is  no evidence for any focal mass lesions. Scattered nonspecific white  matter changes are also noted. Vascular structures are patent at the  skull base.      Impression    IMPRESSION:  1. Multiple acute ischemic infarcts in the right parieto-occipital  lobe.  2. Cerebral atrophy with chronic white matter changes.  3. No evidence for intracranial hemorrhage or any focal mass lesions.   MRA Angiogram Neck w/o & w Contrast    Narrative    MRA ANGIOGRAM NECK WITHOUT AND WITH CONTRAST 1/9/2017 5:00 PM     HISTORY: Vision changes.    TECHNIQUE: MR angiography was performed through the neck without and  with contrast. 10 mL of Gadavist given. Carotid stenoses were  evaluated by comparing the caliber of the proximal internal carotid  artery to the caliber of the distal internal carotid artery.    FINDINGS: Brachiocephalic vessels are patent off the arch. There is  high-grade stenosis of the proximal right external carotid artery, but  no stenosis of the right common or internal carotid artery. There is  mild-to-moderate atherosclerotic disease involving the left carotid  bifurcation but there is no significant stenosis. Both vertebral  arteries show antegrade flow with the right vertebral artery being  dominant. The left vertebral artery is a congenitally small  vessel  with some mild stenosis at its origin.      Impression    IMPRESSION:  1. High-grade stenosis of right external carotid artery at its origin.  2. Mild stenosis of left vertebral artery at its origin. This is the  nondominant vessel.  3. Mild atherosclerotic disease involving both carotid bifurcations  without any significant internal carotid artery stenosis.  4. Widely patent dominant right vertebral artery.                Discharge Summaries     No notes of this type exist for this encounter.         Consult Notes      Consults by Luigi Santa MD at 1/10/2017  9:03 AM     Author:  Luigi Santa MD Service:  Neuro ICU Author Type:  Physician    Filed:  1/10/2017  9:23 AM Note Time:  1/10/2017  9:03 AM Status:  Signed    :  Luigi Santa MD (Physician)       Consult Orders:    1. Neurology IP Consult: Patient to be seen: Routine - within 24 hours; Acute CVA with blurred vision; Consultant may enter orders: Yes [[id number]] ordered by Joe Hauser MD at 01/09/17 1933                  [facility]    Vascular Neurology / Stroke Consultation Note     Rosie Guerrero MRN# [id number]   YOB: 1928 Age: 88 year old    Code Status:Full Code   Date of Admission: 1/9/2017  Date of Consult: 01/10/2017    _________________________________   Primary Care Physician  Drew Hall  ______________________________________________         Assessment and Plan  ______________________________________________  (I63.9) Cerebrovascular accident (CVA), unspecified mechanism (H)  --Right MCA stroke  --Multifocal intracranial stenosis  --No clear severe internal carotid stenosis.   --Echo,  --Asprin 81 mg   (H53.9) Vision changes  --Related to stroke  (E78.2) Mixed hyperlipidemia  (primary encounter diagnosis)  --  --At her age, modification of lipids would not be helpful.  --In addition, patient has severe myalgia related to  statin and she refuses even to consider statins.   #. DVT Prophylaxis  --Mechanical, Patient is able to ambulate  #. PT/OT/Speech  --Start  evaluations  #. Nutrition / GI Prophylaxis  --Per recommendations of speech therapy    Patient can be d/c today        #. Code Status: Full Code    ----------------------------------------------------------------------------------  ----------------------------------------------------------------------------------  Reason for consult: I was asked by Dr. Hauser to evaluate this patient for stroke.    Chief Complaint  ______________________________________________  Left sided weakness  History is obtained from the patient    History of Present Illness  ______________________________________________  88 year old female who presents with blurred vision.  She states that woke up five days ago with blurry vision and feeling that the objects were moving.  She also states that her vision was fuzzy.  Four nights ago she was not able to play cards as usual and was weak in lower extremity and some unsteadiness in her gait. Her symptoms have been improving, but the staff at care facility decided to send her to the emergency room for further evaluation. she denies having any chest pain, no shortness of breath no nausea, no vomiting, no dysuria,no frequency, no urinary urgency.   Overnight, no significant left sided weakness   Past Medical History   ______________________________________________  Past Medical History   Diagnosis Date     Other malignant neoplasm of skin of trunk, except scrotum      Multiple Basal cell CA excisions     Other and unspecified hyperlipidemia      Macular degeneration (senile) of retina, unspecified      Generalized osteoarthrosis, unspecified site      Hx of cervicalgia, and sciatica, resolved with PT     Rubella without mention of complication      Past history of Rubella     Past Surgical History  ______________________________________________  Past Surgical  History   Procedure Laterality Date     Hysterectomy, fuad  1970     With BSO, secondary to fibroids     Hc remove tonsils/adenoids,<11 y/o  1934     Hc colonoscopy thru stoma, diagnostic       NORMAL     Flex sig (medicare pt.)       NORMAL     Prior to Admission Medications  ______________________________________________  Prior to Admission Medications   Prescriptions Last Dose Informant Patient Reported? Taking?   DAILY MULTIPLE VITAMINS OR TABS 2017 at am  Yes Yes   Sig: naturemade mutli + Lutein daily   FISH -[id number] MG OR CAPS 2017 at am  Yes Yes   Si dailoy   Psyllium (NATRUL COLON CARE PO) 2017 at 1800  Yes Yes   Sig: Take by mouth daily   VITAMIN D 400 UNIT OR CAPS 2017 at 1200  Yes Yes   Si CAPSULE DAILY   VITAMIN E 400 UNIT OR CAPS 2017 at 1800  Yes Yes   Sig: ONE CAPSULE DAILY   Zinc 15 MG CAPS 2017 at 1800  Yes Yes   Sig: Take 15 mg by mouth daily   ascorbic acid (VITAMIN C) 500 MG tablet 2017 at am  Yes Yes   Sig: Take by mouth daily   calcium carbonate (OS-PATRICIA 500 MG [place]. [place]) 500 MG tablet 2017 at 1200  Yes Yes   Sig: Take 500 mg by mouth daily   coenzyme Q-10 10 MG CAPS 2017 at 1800  Yes Yes   ginkgo biloba 60 MG CAPS 2017 at 1200  Yes Yes   Sig: Take 1 capsule (60 mg) by mouth daily   magnesium chloride 535 (64 MG) MG TBCR CR tablet 2017 at 1200  Yes Yes   Sig: Take 535 mg by mouth daily   prune juice LIQD 2017 at Unknown time  Yes Yes   Sig: Take 5 mLs by mouth daily as needed for constipation   vitamin  B complex with vitamin C (VITAMIN  B COMPLEX) TABS 2017 at am  Yes Yes   Sig: Take 1 tablet by mouth daily      Facility-Administered Medications: None     Allergies  Allergies   Allergen Reactions     Lipitor [Hmg-Coa-R Inhibitors]      Severe leg cramps     Penicillin [Penicillins]      Sulfa Drugs        Social History  ______________________________________________  Social History     Social History     Marital Status:       Spouse Name: N/A     Number of Children: 0     Years of Education: N/A     Social History Main Topics     Smoking status: Former Smoker     Quit date: 1974     Smokeless tobacco: Not on file     Alcohol Use: 0.0 oz/week     0 Standard drinks or equivalent per week      Comment: occassionally     Drug Use: No     Sexual Activity: Not Currently     Other Topics Concern     Not on file     Social History Narrative       Family History  ______________________________________________  Family History   Problem Relation Age of Onset     Neurologic Disorder Mother      Cerebrovascular disease, dementia,  age 97     CEREBROVASCULAR DISEASE Father       age 79         Review of Systems  ______________________________________________  A comprehensive review of  10 systems was performed and found to be negative except as described in this note  CONSTITUTIONAL: negative for fever, chills, change in weight  INTEGUMENTARY/SKIN: no rash or obvious new lesions  ENT/MOUTH: no sore throat, new sinus pain or nasal drainage, no neck mass noted  RESP: No pleuretic pain, No cough, no hemoptysis, No SOB   CV: negative for chest pain, palpitations or peripheral edema  GI: no nausea, vomiting, change in stools  : no dysuria or hematuria  MUSCULOSKELETAL: no myalgias, arthralgias or join efffusion  ENDOCRINE: no history of polyuria, polydyspsia or symptoms of thyroid dysfunction  PSYCHIATRIC: no change in mood stable  LYMPHATIC: no new lymphadenopathy  HEME: no bleeding or easy bruisability  NEURO: see HPI    Physical Exam  ______________________________________________  Weight:100 lbs 12 oz; Height:Data Unavailable  Temp: 98  F (36.7  C) Temp src: Oral BP: 130/73 mmHg   Heart Rate: 60 Resp: 16 SpO2: 95 % O2 Device: None (Room air)    General Appearance:  No acute distress  Neuro:       Mental Status Exam:   Awake, alert, oriented X3. Speech and language are intact. Mental status is normal       Cranial Nerves:   Pupils 3 mm, reactive. EOMI. Face sensation is normal. Face is symmetric.Tongue and uvula are midline. Other CN are normal           Motor:  5/5 X 4. Tone and bulk are normal           Reflexes:  Normal DTR.Toes downgoing.        Sensory:  Normal to PP, LT, vibration and ROSELYN                   Coordination:   Intact finger-to-nose and toe-to-finger tests       Gait:  Somewhat unstable gait    Neck: no nuchal rigidity, normal thyroid. No carotid bruits.    Cardiovascular: Regular rate and rhythm, no m/r/g  Lungs: Clear to auscultation  Abdomen: Soft, not tender, not distended  Extremities: No clubbing, no cyanosis, no edema    Data  ______________________________________________  All Data personally reviewed:       Labs:   CBC RESULTS:     Recent Labs  Lab 01/10/17  0715 01/09/17  1309   WBC 5.4 6.1   RBC 3.75* 4.24   HGB 11.9 13.6   HCT 35.4 40.5    249     Basic Metabolic Panel:   Recent Labs   Lab Test  01/10/17   0715  01/09/17   1309  10/17/16   1002   NA  143  141  140   POTASSIUM  4.3  4.4  4.3   CHLORIDE  112*  106  106   CO2  25  29  30   BUN  22  24  22   CR  1.00  1.18*  1.08*   GLC  92  96  107*   PATRICIA  9.2  10.4*  9.6     Liver panel:  Recent Labs   Lab Test  10/17/16   1002 10/13/09   PROTTOTAL  7.4   --    ALBUMIN  3.6   --    BILITOTAL  0.6   --    ALKPHOS  89   --    AST  19  22 @   ALT  22  17 @     INR:No lab results found.   Lipid Profile:  Recent Labs   Lab Test  01/09/17   1922  10/17/16   1002  09/17/14   1000 10/13/09 07/14/09   CHOL  221*  234*  216*  256 @  268 @   HDL  79  78  67   --   182 @   LDL  127*  132*  126  164 @   --    TRIG  74  120  114  165 @  193 @   CHOLHDLRATIO   --    --   3.2   --    --      Thyroid Panel:  Recent Labs   Lab Test  01/09/17   1922  10/16/15   1539   TSH  2.61  1.94      Vitamin B12: No lab results found.   Vitamin D level: No lab results found.  A1C:   Recent Labs   Lab Test  01/09/17   1922   A1C  5.5     Troponin I:   Recent Labs   Lab Test   01/10/17   0305  01/09/17   1922   TROPI  <0.015  The 99th percentile for upper reference range is 0.045 ug/L.  Troponin values in   the range of 0.045 - 0.120 ug/L may be associated with risks of adverse   clinical events.    <0.015  The 99th percentile for upper reference range is 0.045 ug/L.  Troponin values in   the range of 0.045 - 0.120 ug/L may be associated with risks of adverse   clinical events.       Ammonia: No lab results found.  CK: No lab results found.     CRP inflammation:   Recent Labs   Lab Test  01/09/17 1922   CRP  <2.9     ESR: No lab results found.    SHANELL: No lab results found.    ANCA: No lab results found.   Drug Screen: No lab results found.  Alcohol level:No lab results found.  UA Results:  Recent Labs   Lab Test  01/09/17   1330   COLOR  Yellow   APPEARANCE  Clear   URINEGLC  Negative   URINEBILI  Negative   URINEKETONE  Negative   SG  1.007   UBLD  Negative   URINEPH  6.0   PROTEIN  Negative   NITRITE  Negative   LEUKEST  Negative   RBCU  1   WBCU  <1     Most Recent 6 Bacteria Isolates From Any Culture (See EPIC Reports for Culture Details):  Recent Labs   Lab Test  01/09/17   1330   CULT  Pending        Cardiac US:   ---           Imaging:   All imaging studies were reviewed personally    MRA neck/head:  1. High-grade stenosis of right external carotid artery at its origin.  2. Mild stenosis of left vertebral artery at its origin. This is the nondominant vessel.  3. Mild atherosclerotic disease involving both carotid bifurcations without any significant internal carotid artery stenosis.  4. Widely patent dominant right vertebral artery.    MRA head  1. Moderate to high-grade short segment stenosis right P1 segment.  2. High-grade stenosis or short segment occlusion of proximal portion of a right M2 branch.    MRI brain:   1. Multiple acute ischemic infarcts in the right parieto-occipital lobe.  2. Cerebral atrophy with chronic white matter changes.  3. No evidence for intracranial  hemorrhage or any focal mass lesions.                        Progress Notes - Physician (Notes from 01/07/17 through 01/10/17)      Progress Notes by Jaimie Purdy RN at 1/10/2017 11:03 AM     Author:  Jaimie Purdy RN Service:  (none) Author Type:      Filed:  1/10/2017 11:36 AM Note Time:  1/10/2017 11:03 AM Status:  Addendum    :  Jaimie Purdy RN ()      Related Notes: Original Note by Jaimie Purdy RN () filed at 1/10/2017 11:16 AM         Care Coordination:    Met with patient in room, introduced self and role.  Outpatient / Observation brochure provided to patient and explained.  Pt denies questions.    Noted pt has OT recommendation for home care including Home RN and OT.    Patient was given choice in selecting homecare and chose the agency within her building, [facility] ([facility]  Updated MD of proposed plan.  Of note, pt hopes to d/c today so she can attend an event at 5pm at her building.  Referral faxed to [phone] at 1140 01/10/2017 as orders received. Confirmation pending.    Will provide contact information on AVS for pt to call if they have questions: [phone]      Jaimie Purdy RN, BSN  [facility] Care Coordinator   Mobile Phone: [phone]           Progress Notes by Janet Kirby OT at 1/10/2017 10:52 AM     Author:  Janet Kirby OT Service:  (none) Author Type:  Occupational Therapist    Filed:  1/10/2017 10:52 AM Note Time:  1/10/2017 10:52 AM Status:  Signed    :  Janet Kirby OT (Occupational Therapist)            01/10/17 1007   Quick Adds   Type of Visit Initial Occupational Therapy Evaluation   Living Environment   Lives With alone   Living Arrangements independent living facility   Home Accessibility grab bars present (toilet)   Transportation Available car;public transportation;family or friend will provide  (Pt no longer drives. )   Self-Care   Dominant Hand right   Usual Activity Tolerance good  "  Current Activity Tolerance moderate   Equipment Currently Used at Home grab bar;cane, straight   Functional Level Prior   Ambulation 1-->assistive equipment   Transferring 1-->assistive equipment   Toileting 1-->assistive equipment   Bathing 1-->assistive equipment   Dressing 0-->independent   Eating 0-->independent   Communication 0-->understands/communicates without difficulty   Swallowing 0-->swallows foods/liquids without difficulty   Cognition 0 - no cognition issues reported   Fall history within last six months no   General Information   Onset of Illness/Injury or Date of Surgery - Date 01/09/17   Referring Physician WILMER Hauser MD   Patient/Family Goals Statement Pt insisted on returning home today for \"5pm birthday party.\"    Additional Occupational Profile Info/Pertinent History of Current Problem Admitted under observation for new onset of L visual changes. Imaging indicated multiple R parieto-occipital lobe.    Precautions/Limitations fall precautions   Cognitive Status Examination   Orientation orientation to person, place and time   Level of Consciousness alert;confused   Able to Follow Commands WNL/WFL   Personal Safety (Cognitive) at risk behaviors demonstrated;decreased insight to deficits   Memory impaired   Visual Perception   Visual Perception Wears glasses   Visual Perception Comments Pt reports blurred vision, primarily out of R eye. Tracking intact. Per chart and per pt, baseline MD.    Sensory Examination   Sensory Quick Adds Light touch;No deficits were identified   Sensory Comments Denies B UE numbness and tingling   Sensation Light Touch, Rehab Eval   LUE within normal limits   RUE within normal limits   Pain Assessment   Patient Currently in Pain No   Range of Motion (ROM)   ROM Quick Adds No deficits were identified   ROM Comment B UE WNL   Strength   Manual Muscle Testing Quick Adds No deficits were identified   Strength Comments B UE 5/5 on MMT   Hand Strength   Hand Strength " "Comments B grasp WNL   Coordination   Upper Extremity Coordination Left UE impaired   Coordination Comments Per pt, \"I feel like my perception isn't there.\"    Transfer Skills   Transfer Transfer Safety Analysis Bed/Chair;Transfer Skill: Stand to Sit;Transfer Safety Analysis Sit/Stand   Transfer Skill: Bed to Chair/Chair to Bed   Level of Simpson: Bed to Chair stand-by assist   Assistive Device - Transfer Skill Bed to Chair Chair to Bed Rehab Eval straight cane   Transfer Safety Analysis Bed/Chair   Transfer Safety Concerns Noted decreased balance during turns   Impairments Contributing to Impaired Transfers impaired balance   Transfer Skill: Sit to Stand   Level of Simpson: Sit/Stand stand-by assist   Assistive Device for Transfer: Sit/Stand straight cane   Transfer Safety Analysis Sit/Stand   Transfer Safety Concerns Noted: Sit/Stand decreased balance during turns   Impaired Transfers: Sit/Stand impaired balance   Toilet Transfer   Toilet Transfer Toilet Transfer Skill;Toilet Transfer Safety Analysis   Transfer Skill: Toilet Transfer   Level of Simpson: Toilet stand-by assist   Assistive Device seat riser;grab bars;straight cane   Transfer Safety Analysis Toilet   Transfer Safety Analysis Toilet impaired balance   Lower Body Dressing   Level of Simpson: Dress Lower Body stand-by assist   Toileting   Level of Simpson: Toilet stand-by assist   Grooming   Level of Simpson: Grooming stand-by assist   Eating/Self Feeding   Level of Simpson: Eating independent   Instrumental Activities of Daily Living (IADL)   Previous Responsibilities meal prep;laundry;medication management;finances   IADL Comments Pt receives one meal daily at facility.    Activities of Daily Living Analysis   Impairments Contributing to Impaired Activities of Daily Living balance impaired;cognition impaired;coordination impaired   General Therapy Interventions   Planned Therapy Interventions ADL " "retraining;cognition;neuromuscular re-education;transfer training   Clinical Impression   Criteria for Skilled Therapeutic Interventions Met yes, treatment indicated   OT Diagnosis Decreased I and safety with ADLs   Influenced by the following impairments Visual changes; Impaired safety; Decreased balance   Assessment of Occupational Performance 1-3 Performance Deficits   Identified Performance Deficits Visual changes; Impaired safety; Decreased balance   Clinical Decision Making (Complexity) Low complexity   Therapy Frequency 3 times/wk   Predicted Duration of Therapy Intervention (days/wks) 7 days   Anticipated Discharge Disposition Home with Assist;Home with Home Therapy   Risks and Benefits of Treatment have been explained. Yes   Patient, Family & other staff in agreement with plan of care Yes   [facility] TM \"6 Clicks\"   2016, Trustees of [facility], under license to [employer].  All rights reserved.   6 Clicks Short Forms Daily Activity Inpatient Short Form   [facility]  \"6 Clicks\" Daily Activity Inpatient Short Form   1. Putting on and taking off regular lower body clothing? 3 - A Little   2. Bathing (including washing, rinsing, drying)? 3 - A Little   3. Toileting, which includes using toilet, bedpan or urinal? 3 - A Little   4. Putting on and taking off regular upper body clothing? 3 - A Little   5. Taking care of personal grooming such as brushing teeth? 3 - A Little   6. Eating meals? 4 - None   Daily Activity Raw Score (Score out of 24.Lower scores equate to lower levels of function) 19   Total Evaluation Time   Total Evaluation Time (Minutes) 12          Progress Notes by Angelica Briggs DO at 1/10/2017  8:01 AM     Author:  Angelica Briggs DO Service:  Hospitalist Author Type:  Physician    Filed:  1/10/2017  9:31 AM Note Time:  1/10/2017  8:01 AM Status:  Signed    :  Angelica Briggs DO (Physician)           [facility]    Hospitalist Progress Note    Date of Service (when I saw the patient): 01/10/2017    Assessment and Plan  Rosie Guerrero is a 88 year old female with PMHx of hyperlipidemia, OA and macular degeneration who presented to the ED on 1/9/2017 with 5d hx of blurry vision and nonspecific weakness. MRI brain showed multiple acute ischemic infarcts in the right parieto-occipital lobe. Was admitted on 1/9/2017.    Acute Ischemic R MCA Stroke:  Presented with complaints of blurred vision and weakness in her lower extremity with reported unsteady gait. Symtoms had began 5d prior to admission but were reportedly improving. MRI brain showed multiple acute ischemic infarcts in the right parieto-occipital lobe. MRA head/neck showed high grade stenosis of the R external carotid artery at its origin, mild stenosis of the L vertebral artery at its origin but no internal carotid A stenosis; mod-high grade stenosis of a short segment of the right P1 segment and high grade stenosis or short segment occlusion of the proximal R M2 branch.    ED discussed with neurology - not a candidate for TPA, Has been started on aspirin.     -- monitor on telemetry, echo w/bubble study ordered  -- 81mg aspirin per neurology  -- FLP this stay: , HDL 79, TG 74 -> intolerant to statins (myalgias)  -- A1C, trops, CRP, TSH all okay  -- PT/OT/SLP consulted    ERIC: Resolved.  Cr 1.18 on admission. No hx of CKD. Cr normalized after IVFs.    FEN: NS@50ml/h per stroke protocol, lytes stable, regular diet  DVT Prophylaxis: PCDs  Code Status: Full Code    Disposition: Anticipate dc back to [facility] later today vs tomorrow, pending completion of workup.    Angelica Briggs    Interval History  Uneventful night. Feeling well today. Denies complaints. Tolerating po. No n/v.     -Data reviewed today: I reviewed all new labs and imaging results over the last 24 hours. I personally reviewed no images or EKG's today.    Physical Exam  Temp: 98   F (36.7  C) Temp src: Oral BP: 130/73 mmHg   Heart Rate: 60 Resp: 16 SpO2: 95 % O2 Device: None (Room air)    Filed Vitals:    01/09/17 1248 01/10/17 0554   Weight: 50.349 kg (111 lb) 45.7 kg (100 lb 12 oz)     Vital Signs with Ranges  Temp:  [97.9  F (36.6  C)-98.7  F (37.1  C)] 98  F (36.7  C)  Heart Rate:  [57-68] 60  Resp:  [14-16] 16  BP: (130-155)/(60-75) 130/73 mmHg  SpO2:  [94 %-98 %] 95 %  I/O last 3 completed shifts:  In: 240 [P.O.:240]  Out: -     Constitutional: Resting comfortably, alert and conversing appropriately, NAD  Respiratory: CTAB, no wheeze/rales/rhonchi  Cardiovascular: HRRR, no MGR  GI: S, Nt, ND, +BS  Skin/Integumen: warm/dry  Neuro: CNs intact, sensation/strenght intact and w/o focal deficits   Other:  no LE edema    Medications    - MEDICATION INSTRUCTIONS -       NaCl 50 mL/hr at 01/09/17 1940       ascorbic acid  500 mg Oral Daily     calcium carbonate  1 tablet Oral Daily     magnesium chloride  535 mg Oral Daily     psyllium  1 packet Oral Daily with supper     vitamin B complex with vitamin C  1 tablet Oral Daily     cholecalciferol  1,000 Units Oral Daily     vitamin E  400 Units Oral Daily with supper     senna-docusate  1-2 tablet Oral BID     aspirin EC  325 mg Oral Daily    Or     aspirin  325 mg Oral or NG Tube Daily    Or     aspirin  300 mg Rectal Daily       Data    Recent Labs  Lab 01/10/17  0715 01/10/17  0305 01/09/17  1922 01/09/17  1309   WBC 5.4  --   --  6.1   HGB 11.9  --   --  13.6   MCV 94  --   --  96     --   --  249     --   --  141   POTASSIUM 4.3  --   --  4.4   CHLORIDE 112*  --   --  106   CO2 25  --   --  29   BUN 22  --   --  24   CR 1.00  --   --  1.18*   ANIONGAP 6  --   --  6   PATRICIA 9.2  --   --  10.4*   GLC 92  --   --  96   TROPI  --  <0.015The 99th percentile for upper reference range is 0.045 ug/L.  Troponin values in the range of 0.045 - 0.120 ug/L may be associated with risks of adverse clinical events. <0.015The 99th percentile for  upper reference range is 0.045 ug/L.  Troponin values in the range of 0.045 - 0.120 ug/L may be associated with risks of adverse clinical events.  --        Recent Results (from the past 24 hour(s))   MR Head w/o Contrast Angiogram    Narrative    MRA ANGIOGRAM HEAD WITHOUT CONTRAST 1/9/2017 3:56 PM    HISTORY:  Vision changes.    COMPARISON: None.    TECHNIQUE: Routine [place] [facility] [place] MRA.    FINDINGS: There is a short segment moderate-to-severe stenosis of the  right P1 segment of the posterior cerebral artery. There is a  high-grade stenosis or short segment occlusion of a right M2 branch  beginning at its origin and extending 5 or 6 mm. There is flow  distally. Left middle cerebral artery complex appears normal. Both  anterior cerebral arteries appear normal. There is a dominant right  vertebral artery with small caliber but patent left vertebral artery.  This is likely developmental. No evidence for aneurysm.      Impression    IMPRESSION:  1. Moderate to high-grade short segment stenosis right P1 segment.  2. High-grade stenosis or short segment occlusion of proximal portion  of a right M2 branch.    RUDI MORGAN MD   MR Brain w/o & w Contrast    Narrative    MRI BRAIN WITHOUT AND WITH CONTRAST  1/9/2017 4:57 PM    HISTORY:  Vision changes.     TECHNIQUE:  Multiplanar, multisequence MRI of the brain without and  with 10 mL Gadavist.    COMPARISON: None.    FINDINGS: Diffusion-weighted images show several areas of restricted  diffusion in the right parieto-occipital lobe with the largest area  measuring approximately 4 x 2.5 cm. These are consistent with acute  ischemic infarcts. There is no evidence for any intracranial  hemorrhage or any significant mass effect. Cerebral atrophy is  present. Postcontrast images show some minimal hyperemia in the area  of acute ischemic infarction, but otherwise no enhancement. There is  no evidence for any focal mass lesions. Scattered nonspecific white  matter changes  are also noted. Vascular structures are patent at the  skull base.      Impression    IMPRESSION:  1. Multiple acute ischemic infarcts in the right parieto-occipital  lobe.  2. Cerebral atrophy with chronic white matter changes.  3. No evidence for intracranial hemorrhage or any focal mass lesions.    WELLINGTON ZIEGLER MD   MRA Angiogram Neck w/o & w Contrast    Narrative    MRA ANGIOGRAM NECK WITHOUT AND WITH CONTRAST 1/9/2017 5:00 PM     HISTORY: Vision changes.    TECHNIQUE: MR angiography was performed through the neck without and  with contrast. 10 mL of Gadavist given. Carotid stenoses were  evaluated by comparing the caliber of the proximal internal carotid  artery to the caliber of the distal internal carotid artery.    FINDINGS: Brachiocephalic vessels are patent off the arch. There is  high-grade stenosis of the proximal right external carotid artery, but  no stenosis of the right common or internal carotid artery. There is  mild-to-moderate atherosclerotic disease involving the left carotid  bifurcation but there is no significant stenosis. Both vertebral  arteries show antegrade flow with the right vertebral artery being  dominant. The left vertebral artery is a congenitally small vessel  with some mild stenosis at its origin.      Impression    IMPRESSION:  1. High-grade stenosis of right external carotid artery at its origin.  2. Mild stenosis of left vertebral artery at its origin. This is the  nondominant vessel.  3. Mild atherosclerotic disease involving both carotid bifurcations  without any significant internal carotid artery stenosis.  4. Widely patent dominant right vertebral artery.    WELLINGTON ZIEGLER MD            ED Notes by Ty Kirkpatrick RN at 1/9/2017  5:26 PM     Author:  Ty Kirkpatrick RN Service:  (none) Author Type:  Registered Nurse    Filed:  1/9/2017  5:29 PM Note Time:  1/9/2017  5:26 PM Status:  Addendum    :  Ty Kirkpatrick RN (Registered Nurse)      Related Notes:  Original Note by Ty Kirkpatrick, RN (Registered Nurse) filed at 1/9/2017  5:29 PM         [facility]  ED Nurse Handoff Report    ED Chief complaint: Loss of Vision      ED Diagnosis:   Final diagnoses:   Vision changes   Cerebrovascular accident (CVA), unspecified mechanism (H)       Code Status: Full Code    Allergies:   Allergies   Allergen Reactions     Lipitor [Hmg-Coa-R Inhibitors]      Severe leg cramps     Penicillin [Penicillins]      Sulfa Drugs        Activity level:  Stand with Assist     Needed?: No    Isolation: No  Infection: Not Applicable    Bariatric?: No      Vital Signs:   Filed Vitals:    01/09/17 1400 01/09/17 1415 01/09/17 1430 01/09/17 1701   BP: 143/65  142/69 148/70   Temp:       TempSrc:       Resp:       Weight:       SpO2: 97% 97% 98% 97%       Cardiac Rhythm: ,        Pain level: 0-10 Pain Scale: 0    Is this patient confused?: No    Patient Report: Initial Complaint: Loss of right peripheral vision  Focused Assessment: Patient with  History of macular degeneration, had right peripheral vision loss for last few days with some extremities numbness and tingling, as well as left facial tingling. Did not seek any medical helping thinking it would go away.   Tests Performed: Labs, MRI  Abnormal Results: MRI  Treatments provided: Aspirn    Family Comments: Pt here alone.    OBS brochure/video discussed/provided to patient: N/A    ED Medications:   Medications   gadobutrol (GADAVIST) injection 10 mL (10 mLs Intravenous Given 1/9/17 1700)   sodium chloride bacteriostatic 0.9 % flush 60 mL (60 mLs Intravenous Given 1/9/17 1701)   aspirin tablet 325 mg (325 mg Oral Given 1/9/17 1703)       Drips infusing?:  No      ED NURSE PHONE NUMBER: [phone]                ED Provider Notes by Nikia Alexis MD at 1/9/2017  1:49 PM     Author:  Nikia Alexis MD Service:  Emergency Medicine Author Type:  Physician    Filed:  1/9/2017  5:23 PM Note Time:  1/9/2017  1:49  PM Status:  Signed    :  Nikia Alexis MD (Physician)             History     Chief Complaint:  Loss of Vision    HPI   Rosie Guerrero is a 88 year old female with a history of macular degeneration who presents to the emergency department for evaluation of blurred vision. The patient states she had a loss of vision in her left peripheral field about five days ago. She thought the vision problem would get better, so she waited to present to the ED.  She also complains of some numbness and tingling in her fingers, arms and back, which she has not experienced before. She also notes some generalized weakness in her legs. She lives independently.    Allergies:  Lipitor  Penicillin  Sulfa Drugs    Medications:    Vitamin B  Coenzyme Q-10  Vitamin C  Ginko Biloba  Hydrocortisone  Multivitamins  Calcium-Magnesium-Zinc  Vitamin D  Vitamin E  Potassium Gluconate  Fish Oil     Past Medical History:    Malignant neoplasm of skin of trunk  Macular degeneration  Osteoarthrosis  Rubella without complication  Hyperlipidemia    Past Surgical History:    Hysterectomy, fuad  Removal tonsils/adenoids  Colonoscopy    Family History:    Cerebrovascular disease- mother, father    Social History:  The patient was in the ED alone.  Smoking Status: Former smoker (quit 1974)  Alcohol Use: Occasionally  Marital Status:     The patient lives independently     Review of Systems   Eyes:        Blurred vision   Neurological: Positive for weakness and numbness.   10 point review of systems performed and is negative except as above and in HPI.    Physical Exam   First Vitals:  BP: 140/73 mmHg  Heart Rate: 67  Temp: 98.3  F (36.8  C)  Resp: 14  Weight: 50.349 kg (111 lb)  SpO2: 97 %      Physical Exam  General: Resting on the gurney, appears comfortable  Head:  The scalp, face, and head appear normal  Mouth/Throat: Mucus membranes are moist  CV:  Regular rate    Normal S1 and S2  No pathological murmur   Resp:  Breath sounds clear and  equal bilaterally    Non-labored, no retractions or accessory muscle use    No coarseness    No wheezing   GI:  Abdomen is soft, no rigidity    No tenderness to palpation  MS:  Normal motor assessment of all extremities.    Good capillary refill noted.  Skin:  No rash or lesions noted.  Neuro:  Cranial nerves II through XII intact.    On examination, the patient had slightly blurred vision (equal in both eyes).    Sensation intact.    Normal strength x 4.    Speech is normal and fluent. No apparent deficit.  Psych:  Awake. Alert.  Normal affect.      Appropriate interactions.    Emergency Department Course     Imaging:  Radiographic findings were communicated with the patient who voiced understanding of the findings.    MR Head w/o Contrast Angiogram:  IMPRESSION:  1. Moderate to high-grade short segment stenosis right P1 segment.  2. High-grade stenosis or short segment occlusion of proximal portion  of a right M2 branch.  Per Radiology.    Laboratory:  CBC: WBC 6.1, HGB 13.6,   BMP: Creatinine 1.18(H), GFR 43(L), Calcium 10.4(H)    UA with Microscopic: Mucous present o/w Negative  Urine culture: In process    Emergency Department Course:  Nursing notes and vitals reviewed.  I performed an exam of the patient as documented above.     1427 I spoke with Dr. Herman in Neurology regarding the patient.    Findings and plan explained to the patient. Patient discharged home with instructions regarding supportive care, medications, and reasons to return. The importance of close follow-up was reviewed. The patient was prescribed Aspirin.      Impression & Plan    Medical Decision Making:  Rosie Guerrero is an 88 year old female who presents to the emergency department with a complaint of vision changes. This has been going on long enough that she would be outside of a TPA window for stroke. Therefore, MRI was obtained. MRI showed moderate to high grade stenosis in her right P1 and well as high grade stenosis vs.  occlusion in a proximal portion of her right P2. Additionally there are multiple acute occipital strokes seen on MRI.  The patient was discussed with Dr. Herman and will be admitted to internal medicine in a neurology bed.    Diagnosis:  (H53.9) Vision changes    Disposition:  The patient was discharged home.    Discharge Medications:  New Prescriptions    ASPIRIN  MG EC TABLET    Take 1 tablet (325 mg) by mouth daily     1/9/2017    EMERGENCY DEPARTMENT    I, Meg Mendez am serving as a scribe at 1349 on January 9, 2017  to document services personally performed by Dr. Alexis based on my observations and the provider's statements to me.      Nikia Alexis MD  01/09/17 1723       ED Notes by Mahendra Perry RN at 1/9/2017 12:42 PM     Author:  Mahendra Perry RN Service:  (none) Author Type:  Registered Nurse    Filed:  1/9/2017 12:42 PM Note Time:  1/9/2017 12:42 PM Status:  Signed    :  Mahendra Perry RN (Registered Nurse)           Bed: ED22  Expected date:   Expected time:   Means of arrival:   Comments:  518 - 88F vision problems eta 1230             Procedure Notes     No notes of this type exist for this encounter.         Progress Notes - Therapies (Notes from 01/07/17 through 01/10/17)      Progress Notes by Janet Kirby OT at 1/10/2017 10:52 AM     Author:  Janet Kirby OT Service:  (none) Author Type:  Occupational Therapist    Filed:  1/10/2017 10:52 AM Note Time:  1/10/2017 10:52 AM Status:  Signed    :  Janet Kirby OT (Occupational Therapist)            01/10/17 1007   Quick Adds   Type of Visit Initial Occupational Therapy Evaluation   Living Environment   Lives With alone   Living Arrangements independent living facility   Home Accessibility grab bars present (toilet)   Transportation Available car;public transportation;family or friend will provide  (Pt no longer drives. )   Self-Care   Dominant Hand right   Usual Activity Tolerance good   Current  "Activity Tolerance moderate   Equipment Currently Used at Home grab bar;cane, straight   Functional Level Prior   Ambulation 1-->assistive equipment   Transferring 1-->assistive equipment   Toileting 1-->assistive equipment   Bathing 1-->assistive equipment   Dressing 0-->independent   Eating 0-->independent   Communication 0-->understands/communicates without difficulty   Swallowing 0-->swallows foods/liquids without difficulty   Cognition 0 - no cognition issues reported   Fall history within last six months no   General Information   Onset of Illness/Injury or Date of Surgery - Date 01/09/17   Referring Physician WILMER Hauser MD   Patient/Family Goals Statement Pt insisted on returning home today for \"5pm birthday party.\"    Additional Occupational Profile Info/Pertinent History of Current Problem Admitted under observation for new onset of L visual changes. Imaging indicated multiple R parieto-occipital lobe.    Precautions/Limitations fall precautions   Cognitive Status Examination   Orientation orientation to person, place and time   Level of Consciousness alert;confused   Able to Follow Commands WNL/WFL   Personal Safety (Cognitive) at risk behaviors demonstrated;decreased insight to deficits   Memory impaired   Visual Perception   Visual Perception Wears glasses   Visual Perception Comments Pt reports blurred vision, primarily out of R eye. Tracking intact. Per chart and per pt, baseline MD.    Sensory Examination   Sensory Quick Adds Light touch;No deficits were identified   Sensory Comments Denies B UE numbness and tingling   Sensation Light Touch, Rehab Eval   LUE within normal limits   RUE within normal limits   Pain Assessment   Patient Currently in Pain No   Range of Motion (ROM)   ROM Quick Adds No deficits were identified   ROM Comment B UE WNL   Strength   Manual Muscle Testing Quick Adds No deficits were identified   Strength Comments B UE 5/5 on MMT   Hand Strength   Hand Strength Comments B grasp " "WNL   Coordination   Upper Extremity Coordination Left UE impaired   Coordination Comments Per pt, \"I feel like my perception isn't there.\"    Transfer Skills   Transfer Transfer Safety Analysis Bed/Chair;Transfer Skill: Stand to Sit;Transfer Safety Analysis Sit/Stand   Transfer Skill: Bed to Chair/Chair to Bed   Level of [place]: Bed to Chair stand-by assist   Assistive Device - Transfer Skill Bed to Chair Chair to Bed Rehab Eval straight cane   Transfer Safety Analysis Bed/Chair   Transfer Safety Concerns Noted decreased balance during turns   Impairments Contributing to Impaired Transfers impaired balance   Transfer Skill: Sit to Stand   Level of [place]: Sit/Stand stand-by assist   Assistive Device for Transfer: Sit/Stand straight cane   Transfer Safety Analysis Sit/Stand   Transfer Safety Concerns Noted: Sit/Stand decreased balance during turns   Impaired Transfers: Sit/Stand impaired balance   Toilet Transfer   Toilet Transfer Toilet Transfer Skill;Toilet Transfer Safety Analysis   Transfer Skill: Toilet Transfer   Level of [place]: Toilet stand-by assist   Assistive Device seat riser;grab bars;straight cane   Transfer Safety Analysis Toilet   Transfer Safety Analysis Toilet impaired balance   Lower Body Dressing   Level of [place]: Dress Lower Body stand-by assist   Toileting   Level of [place]: Toilet stand-by assist   Grooming   Level of [place]: Grooming stand-by assist   Eating/Self Feeding   Level of [place]: Eating independent   Instrumental Activities of Daily Living (IADL)   Previous Responsibilities meal prep;laundry;medication management;finances   IADL Comments Pt receives one meal daily at facility.    Activities of Daily Living Analysis   Impairments Contributing to Impaired Activities of Daily Living balance impaired;cognition impaired;coordination impaired   General Therapy Interventions   Planned Therapy Interventions ADL retraining;cognition;neuromuscular " "re-education;transfer training   Clinical Impression   Criteria for Skilled Therapeutic Interventions Met yes, treatment indicated   OT Diagnosis Decreased I and safety with ADLs   Influenced by the following impairments Visual changes; Impaired safety; Decreased balance   Assessment of Occupational Performance 1-3 Performance Deficits   Identified Performance Deficits Visual changes; Impaired safety; Decreased balance   Clinical Decision Making (Complexity) Low complexity   Therapy Frequency 3 times/wk   Predicted Duration of Therapy Intervention (days/wks) 7 days   Anticipated Discharge Disposition Home with Assist;Home with Home Therapy   Risks and Benefits of Treatment have been explained. Yes   Patient, Family & other staff in agreement with plan of care Yes   [facility] TM \"6 Clicks\"   2016, Trustees of [facility], under license to [employer].  All rights reserved.   6 Clicks Short Forms Daily Activity Inpatient Short Form   [facility]  \"6 Clicks\" Daily Activity Inpatient Short Form   1. Putting on and taking off regular lower body clothing? 3 - A Little   2. Bathing (including washing, rinsing, drying)? 3 - A Little   3. Toileting, which includes using toilet, bedpan or urinal? 3 - A Little   4. Putting on and taking off regular upper body clothing? 3 - A Little   5. Taking care of personal grooming such as brushing teeth? 3 - A Little   6. Eating meals? 4 - None   Daily Activity Raw Score (Score out of 24.Lower scores equate to lower levels of function) 19   Total Evaluation Time   Total Evaluation Time (Minutes) 12          Progress Notes by Jaimie Gifford, SLP at 1/10/2017  9:43 AM     Author:  Jaimie Gifford SLP Service:  Acute IP Rehab Author Type:  Speech Language    Filed:  1/10/2017  9:46 AM Note Time:  1/10/2017  9:43 AM Status:  Cosign Needed    :  Jaimie Gifford, SLP (Speech Language) Cosign Required:  Yes                                          "                                      [facility]          OUTPATIENT SWALLOW  EVALUATION  PLAN OF TREATMENT FOR OUTPATIENT REHABILITATION  (COMPLETE FOR INITIAL CLAIMS ONLY)  Patient's Last Name, First Name, M.I.  YOB: 1928  Rosie Guerrero     Provider's Name   [facility]   Medical Record No.  [id number]     Start of Care Date:   1/10/17   Onset Date:   1/9/17   Type:     ___PT   ____OT  ___X_SLP Medical Diagnosis:  Dysphagia       Treatment Diagnosis:   Dysphagia Visits from SOC:  1     _________________________________________________________________________________  Plan of Treatment/Functional Goals:   Evaluation only, Tx not indicated, at this time.          No name on file.       I CERTIFY THE NEED FOR THESE SERVICES FURNISHED UNDER        THIS PLAN OF TREATMENT AND WHILE UNDER MY CARE     (Physician co-signature of this document indicates review and certification of the therapy plan).                                    Initial Assessment        See Epic Evaluation

## 2017-01-09 NOTE — IP AVS SNAPSHOT
` `       Patient Information     Patient Name Sex     Rosie Guerrero ([id number]) Female 1928       Room Bed    [facility] 0703-02      Patient Demographics     Address Phone    [address] [place] [id number] [phone] (Home)  none (Work)  none (Mobile)      Patient Ethnicity & Race     Ethnic Group Patient Race    American White

## 2017-01-10 ENCOUNTER — APPOINTMENT (OUTPATIENT)
Dept: OCCUPATIONAL THERAPY | Facility: CLINIC | Age: 82
End: 2017-01-10
Attending: INTERNAL MEDICINE
Payer: MEDICARE

## 2017-01-10 ENCOUNTER — APPOINTMENT (OUTPATIENT)
Dept: CARDIOLOGY | Facility: CLINIC | Age: 82
End: 2017-01-10
Attending: INTERNAL MEDICINE
Payer: MEDICARE

## 2017-01-10 ENCOUNTER — APPOINTMENT (OUTPATIENT)
Dept: SPEECH THERAPY | Facility: CLINIC | Age: 82
End: 2017-01-10
Attending: INTERNAL MEDICINE
Payer: MEDICARE

## 2017-01-10 VITALS
DIASTOLIC BLOOD PRESSURE: 77 MMHG | TEMPERATURE: 98.1 F | WEIGHT: 100.75 LBS | OXYGEN SATURATION: 98 % | RESPIRATION RATE: 16 BRPM | BODY MASS INDEX: 16.5 KG/M2 | SYSTOLIC BLOOD PRESSURE: 165 MMHG

## 2017-01-10 PROBLEM — I63.9 STROKE (H): Status: ACTIVE | Noted: 2017-01-10

## 2017-01-10 LAB
ANION GAP SERPL CALCULATED.3IONS-SCNC: 6 MMOL/L (ref 3–14)
BACTERIA SPEC CULT: NORMAL
BUN SERPL-MCNC: 22 MG/DL (ref 7–30)
CALCIUM SERPL-MCNC: 9.2 MG/DL (ref 8.5–10.1)
CHLORIDE SERPL-SCNC: 112 MMOL/L (ref 94–109)
CO2 SERPL-SCNC: 25 MMOL/L (ref 20–32)
CREAT SERPL-MCNC: 1 MG/DL (ref 0.52–1.04)
ERYTHROCYTE [DISTWIDTH] IN BLOOD BY AUTOMATED COUNT: 13.1 % (ref 10–15)
GFR SERPL CREATININE-BSD FRML MDRD: 52 ML/MIN/1.7M2
GLUCOSE BLDC GLUCOMTR-MCNC: 88 MG/DL (ref 70–99)
GLUCOSE SERPL-MCNC: 92 MG/DL (ref 70–99)
HCT VFR BLD AUTO: 35.4 % (ref 35–47)
HGB BLD-MCNC: 11.9 G/DL (ref 11.7–15.7)
INTERPRETATION ECG - MUSE: NORMAL
Lab: NORMAL
MCH RBC QN AUTO: 31.7 PG (ref 26.5–33)
MCHC RBC AUTO-ENTMCNC: 33.6 G/DL (ref 31.5–36.5)
MCV RBC AUTO: 94 FL (ref 78–100)
MICRO REPORT STATUS: NORMAL
PLATELET # BLD AUTO: 204 10E9/L (ref 150–450)
POTASSIUM SERPL-SCNC: 4.3 MMOL/L (ref 3.4–5.3)
RBC # BLD AUTO: 3.75 10E12/L (ref 3.8–5.2)
SODIUM SERPL-SCNC: 143 MMOL/L (ref 133–144)
SPECIMEN SOURCE: NORMAL
TROPONIN I SERPL-MCNC: NORMAL UG/L (ref 0–0.04)
WBC # BLD AUTO: 5.4 10E9/L (ref 4–11)

## 2017-01-10 PROCEDURE — 40000133 ZZH STATISTIC OT WARD VISIT

## 2017-01-10 PROCEDURE — 92610 EVALUATE SWALLOWING FUNCTION: CPT | Mod: GN | Performed by: SPEECH-LANGUAGE PATHOLOGIST

## 2017-01-10 PROCEDURE — 40000264 ECHO COMPLETE BUBBLE

## 2017-01-10 PROCEDURE — G8997 SWALLOW GOAL STATUS: HCPCS | Mod: GN,CH | Performed by: SPEECH-LANGUAGE PATHOLOGIST

## 2017-01-10 PROCEDURE — 99217 ZZC OBSERVATION CARE DISCHARGE: CPT | Performed by: INTERNAL MEDICINE

## 2017-01-10 PROCEDURE — 93306 TTE W/DOPPLER COMPLETE: CPT | Mod: 26 | Performed by: INTERNAL MEDICINE

## 2017-01-10 PROCEDURE — 80048 BASIC METABOLIC PNL TOTAL CA: CPT | Performed by: INTERNAL MEDICINE

## 2017-01-10 PROCEDURE — A9270 NON-COVERED ITEM OR SERVICE: HCPCS | Mod: GY | Performed by: INTERNAL MEDICINE

## 2017-01-10 PROCEDURE — G8998 SWALLOW D/C STATUS: HCPCS | Mod: GN,CH | Performed by: SPEECH-LANGUAGE PATHOLOGIST

## 2017-01-10 PROCEDURE — 85027 COMPLETE CBC AUTOMATED: CPT | Performed by: INTERNAL MEDICINE

## 2017-01-10 PROCEDURE — 36415 COLL VENOUS BLD VENIPUNCTURE: CPT | Performed by: INTERNAL MEDICINE

## 2017-01-10 PROCEDURE — G0378 HOSPITAL OBSERVATION PER HR: HCPCS

## 2017-01-10 PROCEDURE — 84484 ASSAY OF TROPONIN QUANT: CPT | Performed by: INTERNAL MEDICINE

## 2017-01-10 PROCEDURE — G8996 SWALLOW CURRENT STATUS: HCPCS | Mod: GN,CH | Performed by: SPEECH-LANGUAGE PATHOLOGIST

## 2017-01-10 PROCEDURE — 40000225 ZZH STATISTIC SLP WARD VISIT: Performed by: SPEECH-LANGUAGE PATHOLOGIST

## 2017-01-10 PROCEDURE — 97535 SELF CARE MNGMENT TRAINING: CPT | Mod: GO

## 2017-01-10 PROCEDURE — 25000132 ZZH RX MED GY IP 250 OP 250 PS 637: Mod: GY | Performed by: INTERNAL MEDICINE

## 2017-01-10 PROCEDURE — 00000146 ZZHCL STATISTIC GLUCOSE BY METER IP

## 2017-01-10 PROCEDURE — 97165 OT EVAL LOW COMPLEX 30 MIN: CPT | Mod: GO

## 2017-01-10 RX ORDER — ASPIRIN 81 MG/1
81 TABLET ORAL DAILY
Status: DISCONTINUED | OUTPATIENT
Start: 2017-01-10 | End: 2017-01-10 | Stop reason: HOSPADM

## 2017-01-10 RX ADMIN — B-COMPLEX W/ C & FOLIC ACID TAB 1 TABLET: TAB at 09:03

## 2017-01-10 RX ADMIN — ASPIRIN 325 MG: 325 TABLET, DELAYED RELEASE ORAL at 09:03

## 2017-01-10 RX ADMIN — CALCIUM 500 MG: 500 TABLET ORAL at 09:03

## 2017-01-10 RX ADMIN — VITAMIN D, TAB 1000IU (100/BT) 1000 UNITS: 25 TAB at 09:03

## 2017-01-10 RX ADMIN — OXYCODONE HYDROCHLORIDE AND ACETAMINOPHEN 500 MG: 500 TABLET ORAL at 09:03

## 2017-01-10 RX ADMIN — SENNOSIDES AND DOCUSATE SODIUM 1 TABLET: 8.6; 5 TABLET ORAL at 09:04

## 2017-01-10 RX ADMIN — MAGNESIUM 64 MG (MAGNESIUM CHLORIDE) TABLET,DELAYED RELEASE 535 MG: at 09:03

## 2017-01-10 ASSESSMENT — ACTIVITIES OF DAILY LIVING (ADL): PREVIOUS_RESPONSIBILITIES: MEAL PREP;LAUNDRY;MEDICATION MANAGEMENT;FINANCES

## 2017-01-10 ASSESSMENT — VISUAL ACUITY
OU: NORMAL ACUITY

## 2017-01-10 NOTE — PROGRESS NOTES
[facility]    Hospitalist Progress Note    Date of Service (when I saw the patient): 01/10/2017    Assessment and Plan  Rosie Guerrero is a 88 year old female with PMHx of hyperlipidemia, OA and macular degeneration who presented to the ED on 1/9/2017 with 5d hx of blurry vision and nonspecific weakness. MRI brain showed multiple acute ischemic infarcts in the right parieto-occipital lobe. Was admitted on 1/9/2017.    Acute Ischemic R MCA Stroke:  Presented with complaints of blurred vision and weakness in her lower extremity with reported unsteady gait. Symtoms had began 5d prior to admission but were reportedly improving. MRI brain showed multiple acute ischemic infarcts in the right parieto-occipital lobe. MRA head/neck showed high grade stenosis of the R external carotid artery at its origin, mild stenosis of the L vertebral artery at its origin but no internal carotid A stenosis; mod-high grade stenosis of a short segment of the right P1 segment and high grade stenosis or short segment occlusion of the proximal R M2 branch.    ED discussed with neurology - not a candidate for TPA, Has been started on aspirin.     -- monitor on telemetry, echo w/bubble study ordered  -- 81mg aspirin per neurology  -- FLP this stay: , HDL 79, TG 74 -> intolerant to statins (myalgias)  -- A1C, trops, CRP, TSH all okay  -- PT/OT/SLP consulted    ERIC: Resolved.  Cr 1.18 on admission. No hx of CKD. Cr normalized after IVFs.    FEN: NS@50ml/h per stroke protocol, lytes stable, regular diet  DVT Prophylaxis: PCDs  Code Status: Full Code    Disposition: Anticipate dc back to [facility] later today vs tomorrow, pending completion of workup.    Angelica Briggs    Interval History  Uneventful night. Feeling well today. Denies complaints. Tolerating po. No n/v.     -Data reviewed today: I reviewed all new labs and imaging results over the last 24 hours. I personally reviewed no images or EKG's  today.    Physical Exam  Temp: 98  F (36.7  C) Temp src: Oral BP: 130/73 mmHg   Heart Rate: 60 Resp: 16 SpO2: 95 % O2 Device: None (Room air)    Filed Vitals:    01/09/17 1248 01/10/17 0554   Weight: 50.349 kg (111 lb) 45.7 kg (100 lb 12 oz)     Vital Signs with Ranges  Temp:  [97.9  F (36.6  C)-98.7  F (37.1  C)] 98  F (36.7  C)  Heart Rate:  [57-68] 60  Resp:  [14-16] 16  BP: (130-155)/(60-75) 130/73 mmHg  SpO2:  [94 %-98 %] 95 %  I/O last 3 completed shifts:  In: 240 [P.O.:240]  Out: -     Constitutional: Resting comfortably, alert and conversing appropriately, NAD  Respiratory: CTAB, no wheeze/rales/rhonchi  Cardiovascular: HRRR, no MGR  GI: S, Nt, ND, +BS  Skin/Integumen: warm/dry  Neuro: CNs intact, sensation/strenght intact and w/o focal deficits   Other:  no LE edema    Medications    - MEDICATION INSTRUCTIONS -       NaCl 50 mL/hr at 01/09/17 1940       ascorbic acid  500 mg Oral Daily     calcium carbonate  1 tablet Oral Daily     magnesium chloride  535 mg Oral Daily     psyllium  1 packet Oral Daily with supper     vitamin B complex with vitamin C  1 tablet Oral Daily     cholecalciferol  1,000 Units Oral Daily     vitamin E  400 Units Oral Daily with supper     senna-docusate  1-2 tablet Oral BID     aspirin EC  325 mg Oral Daily    Or     aspirin  325 mg Oral or NG Tube Daily    Or     aspirin  300 mg Rectal Daily       Data    Recent Labs  Lab 01/10/17  0715 01/10/17  0305 01/09/17  1922 01/09/17  1309   WBC 5.4  --   --  6.1   HGB 11.9  --   --  13.6   MCV 94  --   --  96     --   --  249     --   --  141   POTASSIUM 4.3  --   --  4.4   CHLORIDE 112*  --   --  106   CO2 25  --   --  29   BUN 22  --   --  24   CR 1.00  --   --  1.18*   ANIONGAP 6  --   --  6   PATRICIA 9.2  --   --  10.4*   GLC 92  --   --  96   TROPI  --  <0.015The 99th percentile for upper reference range is 0.045 ug/L.  Troponin values in the range of 0.045 - 0.120 ug/L may be associated with risks of adverse clinical  events. <0.015The 99th percentile for upper reference range is 0.045 ug/L.  Troponin values in the range of 0.045 - 0.120 ug/L may be associated with risks of adverse clinical events.  --        Recent Results (from the past 24 hour(s))   MR Head w/o Contrast Angiogram    Narrative    MRA ANGIOGRAM HEAD WITHOUT CONTRAST 1/9/2017 3:56 PM    HISTORY:  Vision changes.    COMPARISON: None.    TECHNIQUE: Routine [place] MRA.    FINDINGS: There is a short segment moderate-to-severe stenosis of the  right P1 segment of the posterior cerebral artery. There is a  high-grade stenosis or short segment occlusion of a right M2 branch  beginning at its origin and extending 5 or 6 mm. There is flow  distally. Left middle cerebral artery complex appears normal. Both  anterior cerebral arteries appear normal. There is a dominant right  vertebral artery with small caliber but patent left vertebral artery.  This is likely developmental. No evidence for aneurysm.      Impression    IMPRESSION:  1. Moderate to high-grade short segment stenosis right P1 segment.  2. High-grade stenosis or short segment occlusion of proximal portion  of a right M2 branch.    RUDI MORGAN MD   MR Brain w/o & w Contrast    Narrative    MRI BRAIN WITHOUT AND WITH CONTRAST  1/9/2017 4:57 PM    HISTORY:  Vision changes.     TECHNIQUE:  Multiplanar, multisequence MRI of the brain without and  with 10 mL Gadavist.    COMPARISON: None.    FINDINGS: Diffusion-weighted images show several areas of restricted  diffusion in the right parieto-occipital lobe with the largest area  measuring approximately 4 x 2.5 cm. These are consistent with acute  ischemic infarcts. There is no evidence for any intracranial  hemorrhage or any significant mass effect. Cerebral atrophy is  present. Postcontrast images show some minimal hyperemia in the area  of acute ischemic infarction, but otherwise no enhancement. There is  no evidence for any focal mass lesions.  Scattered nonspecific white  matter changes are also noted. Vascular structures are patent at the  skull base.      Impression    IMPRESSION:  1. Multiple acute ischemic infarcts in the right parieto-occipital  lobe.  2. Cerebral atrophy with chronic white matter changes.  3. No evidence for intracranial hemorrhage or any focal mass lesions.    WELLINGTON ZIEGLER MD   MRA Angiogram Neck w/o & w Contrast    Narrative    MRA ANGIOGRAM NECK WITHOUT AND WITH CONTRAST 1/9/2017 5:00 PM     HISTORY: Vision changes.    TECHNIQUE: MR angiography was performed through the neck without and  with contrast. 10 mL of Gadavist given. Carotid stenoses were  evaluated by comparing the caliber of the proximal internal carotid  artery to the caliber of the distal internal carotid artery.    FINDINGS: Brachiocephalic vessels are patent off the arch. There is  high-grade stenosis of the proximal right external carotid artery, but  no stenosis of the right common or internal carotid artery. There is  mild-to-moderate atherosclerotic disease involving the left carotid  bifurcation but there is no significant stenosis. Both vertebral  arteries show antegrade flow with the right vertebral artery being  dominant. The left vertebral artery is a congenitally small vessel  with some mild stenosis at its origin.      Impression    IMPRESSION:  1. High-grade stenosis of right external carotid artery at its origin.  2. Mild stenosis of left vertebral artery at its origin. This is the  nondominant vessel.  3. Mild atherosclerotic disease involving both carotid bifurcations  without any significant internal carotid artery stenosis.  4. Widely patent dominant right vertebral artery.    WELLINGTON ZIEGLER MD

## 2017-01-10 NOTE — DISCHARGE INSTRUCTIONS
Your doctor has ordered home care to help you after your hospital stay.  They will contact you regarding your first visit.  [facility].  If you have not received a call within 48 hours of discharge, please call them at [phone].    A hospital follow-up appointment has been scheduled for you.    It is important to go to this appointment--bring this paperwork with you.  At this visit, you should discuss your hospital stay--tell your primary care doctor about how your symptoms are doing, and if there were any medication changes made.  Your doctor will make sure you are still doing OK and update the records at their clinic.       Make sure to see your eye doctor tomorrow or the next day at the latest.  You should be seen in TIA clinic by neurology in the next 1-3 days as well.  If you have any worsening of symptoms please return to the emergency department      Your risk factors for stroke or TIA (transient ischemic attack):    Your Risk Factors Your Results Normal Ranges   High blood pressure BP Readings from Last 1 Encounters:   01/10/17 165/77    Less than 120/80   Cholesterol              Total CHOL      221   1/9/2017   Less than 150    Triglycerides   TRIG       74   1/9/2017 Less than 150   LDL LDL      127   1/9/2017    Less than 70   HDL HDL       79   1/9/2017         Greater than 40 (men)  Greater than 50 (women)   Diabetes   Recent Labs  Lab 01/10/17  0715   GLC 92    Fasting blood glucose    Smoking/tobacco use  Quit smoking and tobacco   Overweight  Lose 1-2 pounds a week   Lack of exercise  30 minutes moderate activity each day   Other risk factors include carotid (neck) artery disease, atrial fibrillation and stress. You may be on new medicine to treat high blood pressure, cholesterol, diabetes or atrial fibrillation.    Understanding Stroke Booklet given to patient. Please refer to booklet for further information.    Stroke warning signs and symptoms -  CALL 911 right away for:  - Sudden numbness or weakness in the face, arm or leg (often on one side of the body).  - Sudden confusion or trouble understanding what is going on.  - Sudden blurred or decreased vision in one or both eyes.  - Sudden trouble speaking, loss of balance, dizziness or problems with coordination.  - Sudden, severe headache for no reason.  - Fainting or seizures.  - Symptoms may go away then come back suddenly.

## 2017-01-10 NOTE — CONSULTS
[facility] [place]  [facility]    Vascular Neurology / Stroke Consultation Note     Rosie Guerrero MRN# [id number]   YOB: 1928 Age: 88 year old    Code Status:Full Code   Date of Admission: 1/9/2017  Date of Consult: 01/10/2017    _________________________________   Primary Care Physician  Drew Hall  ______________________________________________         Assessment and Plan  ______________________________________________  (I63.9) Cerebrovascular accident (CVA), unspecified mechanism (H)  --Right MCA stroke  --Multifocal intracranial stenosis  --No clear severe internal carotid stenosis.   --Echo,  --Asprin 81 mg   (H53.9) Vision changes  --Related to stroke  (E78.2) Mixed hyperlipidemia  (primary encounter diagnosis)  --  --At her age, modification of lipids would not be helpful.  --In addition, patient has severe myalgia related to statin and she refuses even to consider statins.   #. DVT Prophylaxis  --Mechanical, Patient is able to ambulate  #. PT/OT/Speech  --Start  evaluations  #. Nutrition / GI Prophylaxis  --Per recommendations of speech therapy    Patient can be d/c today        #. Code Status: Full Code    ----------------------------------------------------------------------------------  ----------------------------------------------------------------------------------  Reason for consult: I was asked by Dr. Hauser to evaluate this patient for stroke.    Chief Complaint  ______________________________________________  Left sided weakness  History is obtained from the patient    History of Present Illness  ______________________________________________  88 year old female who presents with blurred vision.  She states that woke up five days ago with blurry vision and feeling that the objects were moving.  She also states that her vision was fuzzy.  Four nights ago she was not able to play cards as usual and was weak in lower extremity and some  unsteadiness in her gait. Her symptoms have been improving, but the staff at care facility decided to send her to the emergency room for further evaluation. she denies having any chest pain, no shortness of breath no nausea, no vomiting, no dysuria,no frequency, no urinary urgency.   Overnight, no significant left sided weakness   Past Medical History   ______________________________________________  Past Medical History   Diagnosis Date     Other malignant neoplasm of skin of trunk, except scrotum      Multiple Basal cell CA excisions     Other and unspecified hyperlipidemia      Macular degeneration (senile) of retina, unspecified      Generalized osteoarthrosis, unspecified site      Hx of cervicalgia, and sciatica, resolved with PT     Rubella without mention of complication      Past history of Rubella     Past Surgical History  ______________________________________________  Past Surgical History   Procedure Laterality Date     Hysterectomy, fuad  1970     With BSO, secondary to fibroids     Hc remove tonsils/adenoids,<11 y/o  1934     Hc colonoscopy thru stoma, diagnostic       NORMAL     Flex sig (medicare pt.)       NORMAL     Prior to Admission Medications  ______________________________________________  Prior to Admission Medications   Prescriptions Last Dose Informant Patient Reported? Taking?   DAILY MULTIPLE VITAMINS OR TABS 2017 at am  Yes Yes   Sig: naturemade mutli + Lutein daily   FISH -[id number] MG OR CAPS 2017 at am  Yes Yes   Si dailoy   Psyllium (NATRUL COLON CARE PO) 2017 at 1800  Yes Yes   Sig: Take by mouth daily   VITAMIN D 400 UNIT OR CAPS 2017 at 1200  Yes Yes   Si CAPSULE DAILY   VITAMIN E 400 UNIT OR CAPS 2017 at 1800  Yes Yes   Sig: ONE CAPSULE DAILY   Zinc 15 MG CAPS 2017 at 1800  Yes Yes   Sig: Take 15 mg by mouth daily   ascorbic acid (VITAMIN C) 500 MG tablet 2017 at am  Yes Yes   Sig: Take by mouth daily   calcium carbonate (OS-PATRICIA  500 MG Elim IRA. CA) 500 MG tablet 2017 at 1200  Yes Yes   Sig: Take 500 mg by mouth daily   coenzyme Q-10 10 MG CAPS 2017 at 1800  Yes Yes   ginkgo biloba 60 MG CAPS 2017 at 1200  Yes Yes   Sig: Take 1 capsule (60 mg) by mouth daily   magnesium chloride 535 (64 MG) MG TBCR CR tablet 2017 at 1200  Yes Yes   Sig: Take 535 mg by mouth daily   prune juice LIQD 2017 at Unknown time  Yes Yes   Sig: Take 5 mLs by mouth daily as needed for constipation   vitamin  B complex with vitamin C (VITAMIN  B COMPLEX) TABS 2017 at am  Yes Yes   Sig: Take 1 tablet by mouth daily      Facility-Administered Medications: None     Allergies  Allergies   Allergen Reactions     Lipitor [Hmg-Coa-R Inhibitors]      Severe leg cramps     Penicillin [Penicillins]      Sulfa Drugs        Social History  ______________________________________________  Social History     Social History     Marital Status:      Spouse Name: N/A     Number of Children: 0     Years of Education: N/A     Social History Main Topics     Smoking status: Former Smoker     Quit date: 1974     Smokeless tobacco: Not on file     Alcohol Use: 0.0 oz/week     0 Standard drinks or equivalent per week      Comment: occassionally     Drug Use: No     Sexual Activity: Not Currently     Other Topics Concern     Not on file     Social History Narrative       Family History  ______________________________________________  Family History   Problem Relation Age of Onset     Neurologic Disorder Mother      Cerebrovascular disease, dementia,  age 97     CEREBROVASCULAR DISEASE Father       age 79         Review of Systems  ______________________________________________  A comprehensive review of  10 systems was performed and found to be negative except as described in this note  CONSTITUTIONAL: negative for fever, chills, change in weight  INTEGUMENTARY/SKIN: no rash or obvious new lesions  ENT/MOUTH: no sore throat, new sinus pain or nasal  drainage, no neck mass noted  RESP: No pleuretic pain, No cough, no hemoptysis, No SOB   CV: negative for chest pain, palpitations or peripheral edema  GI: no nausea, vomiting, change in stools  : no dysuria or hematuria  MUSCULOSKELETAL: no myalgias, arthralgias or join efffusion  ENDOCRINE: no history of polyuria, polydyspsia or symptoms of thyroid dysfunction  PSYCHIATRIC: no change in mood stable  LYMPHATIC: no new lymphadenopathy  HEME: no bleeding or easy bruisability  NEURO: see HPI    Physical Exam  ______________________________________________  Weight:100 lbs 12 oz; Height:Data Unavailable  Temp: 98  F (36.7  C) Temp src: Oral BP: 130/73 mmHg   Heart Rate: 60 Resp: 16 SpO2: 95 % O2 Device: None (Room air)    General Appearance:  No acute distress  Neuro:       Mental Status Exam:   Awake, alert, oriented X3. Speech and language are intact. Mental status is normal       Cranial Nerves:  Pupils 3 mm, reactive. EOMI. Face sensation is normal. Face is symmetric.Tongue and uvula are midline. Other CN are normal           Motor:  5/5 X 4. Tone and bulk are normal           Reflexes:  Normal DTR.Toes downgoing.        Sensory:  Normal to PP, LT, vibration and ROSELYN                   Coordination:   Intact finger-to-nose and toe-to-finger tests       Gait:  Somewhat unstable gait    Neck: no nuchal rigidity, normal thyroid. No carotid bruits.    Cardiovascular: Regular rate and rhythm, no m/r/g  Lungs: Clear to auscultation  Abdomen: Soft, not tender, not distended  Extremities: No clubbing, no cyanosis, no edema    Data  ______________________________________________  All Data personally reviewed:       Labs:   CBC RESULTS:     Recent Labs  Lab 01/10/17  0715 01/09/17  1309   WBC 5.4 6.1   RBC 3.75* 4.24   HGB 11.9 13.6   HCT 35.4 40.5    249     Basic Metabolic Panel:   Recent Labs   Lab Test  01/10/17   0715  01/09/17   1309  10/17/16   1002   NA  143  141  140   POTASSIUM  4.3  4.4  4.3   CHLORIDE  112*   106  106   CO2  25  29  30   BUN  22  24  22   CR  1.00  1.18*  1.08*   GLC  92  96  107*   PATRICIA  9.2  10.4*  9.6     Liver panel:  Recent Labs   Lab Test  10/17/16   1002 10/13/09   PROTTOTAL  7.4   --    ALBUMIN  3.6   --    BILITOTAL  0.6   --    ALKPHOS  89   --    AST  19  22 @   ALT  22  17 @     INR:No lab results found.   Lipid Profile:  Recent Labs   Lab Test  01/09/17   1922  10/17/16   1002  09/17/14   1000 10/13/09 07/14/09   CHOL  221*  234*  216*  256 @  268 @   HDL  79  78  67   --   182 @   LDL  127*  132*  126  164 @   --    TRIG  74  120  114  165 @  193 @   CHOLHDLRATIO   --    --   3.2   --    --      Thyroid Panel:  Recent Labs   Lab Test  01/09/17   1922  10/16/15   1539   TSH  2.61  1.94      Vitamin B12: No lab results found.   Vitamin D level: No lab results found.  A1C:   Recent Labs   Lab Test  01/09/17 1922   A1C  5.5     Troponin I:   Recent Labs   Lab Test  01/10/17   0305  01/09/17 1922   TROPI  <0.015  The 99th percentile for upper reference range is 0.045 ug/L.  Troponin values in   the range of 0.045 - 0.120 ug/L may be associated with risks of adverse   clinical events.    <0.015  The 99th percentile for upper reference range is 0.045 ug/L.  Troponin values in   the range of 0.045 - 0.120 ug/L may be associated with risks of adverse   clinical events.       Ammonia: No lab results found.  CK: No lab results found.     CRP inflammation:   Recent Labs   Lab Test  01/09/17 1922   CRP  <2.9     ESR: No lab results found.    SHANELL: No lab results found.    ANCA: No lab results found.   Drug Screen: No lab results found.  Alcohol level:No lab results found.  UA Results:  Recent Labs   Lab Test  01/09/17   1330   COLOR  Yellow   APPEARANCE  Clear   URINEGLC  Negative   URINEBILI  Negative   URINEKETONE  Negative   SG  1.007   UBLD  Negative   URINEPH  6.0   PROTEIN  Negative   NITRITE  Negative   LEUKEST  Negative   RBCU  1   WBCU  <1     Most Recent 6 Bacteria Isolates From Any  Culture (See EPIC Reports for Culture Details):  Recent Labs   Lab Test  01/09/17   1330   CULT  Pending        Cardiac US:   ---           Imaging:   All imaging studies were reviewed personally    MRA neck/head:  1. High-grade stenosis of right external carotid artery at its origin.  2. Mild stenosis of left vertebral artery at its origin. This is the nondominant vessel.  3. Mild atherosclerotic disease involving both carotid bifurcations without any significant internal carotid artery stenosis.  4. Widely patent dominant right vertebral artery.    MRA head  1. Moderate to high-grade short segment stenosis right P1 segment.  2. High-grade stenosis or short segment occlusion of proximal portion of a right M2 branch.    MRI brain:   1. Multiple acute ischemic infarcts in the right parieto-occipital lobe.  2. Cerebral atrophy with chronic white matter changes.  3. No evidence for intracranial hemorrhage or any focal mass lesions.

## 2017-01-10 NOTE — PROGRESS NOTES
Care Coordination:    Met with patient in room, introduced self and role.  Outpatient / Observation brochure provided to patient and explained.  Pt denies questions.    Noted pt has OT recommendation for home care including Home RN and OT.    Patient was given choice in selecting homecare and chose the agency within her building, [facility] (at [facility]).  Updated MD of proposed plan.  Of note, pt hopes to d/c today so she can attend an event at 5pm at her building.  Referral faxed to [phone] at 1140 01/10/2017 as orders received. Confirmation pending.    Will provide contact information on AVS for pt to call if they have questions: [phone]    Noted pt thought it was 4:40pm, and discovered pt's watch had stopped.  Pt relieved that it was not so close to 5:00pm and apologized for being in a rush.  SW to set up taxi cab ride.    Followup appointments with Dr. Hall PCP made 1/17/17 at 11:45pm, and with Sammi KAUFFMAN neurology 2/7/17 at 1:30pm.    Jaimie Purdy RN, BSN  [facility] Care Coordinator   Mobile Phone: [phone]

## 2017-01-10 NOTE — PLAN OF CARE
Problem: Goal Outcome Summary  Goal: Goal Outcome Summary  Outcome: Improving  A&O x4. Baseline blurred vision, mild gen weakness BLE but improving, neuros otherwise intact . VSS. Tele NSR. Reg diet. Up with 1 asst and GB. Denies pain. Plan for echo later today, cont to monitor.

## 2017-01-10 NOTE — DISCHARGE SUMMARY
[facility]    Discharge Summary  Hospitalist    Date of Admission:  1/9/2017  Date of Discharge:  1/10/2017  Discharging Provider: Angelica Briggs  Date of Service (when I saw the patient): 01/10/2017    Discharge Diagnoses  Acute Ischemic R MCA Stroke  ERIC: Resolved.    History of Present Illness  Rosie Guerrero is a 88 year old female with PMHx of hyperlipidemia, OA and macular degeneration who presented to the ED on 1/9/2017 with 5d hx of blurry vision and nonspecific weakness. MRI brain showed multiple acute ischemic infarcts in the right parieto-occipital lobe. Was admitted on 1/9/2017.    Hospital Course  Rosie Guerrero was admitted on 1/9/2017.  The following problems were addressed during her hospitalization:    Acute Ischemic R MCA Stroke:  Presented with complaints of blurred vision and weakness in her lower extremity with reported unsteady gait. Symtoms had began 5d prior to admission but were reportedly improving. MRI brain showed multiple acute ischemic infarcts in the right parieto-occipital lobe. MRA head/neck showed high grade stenosis of the R external carotid artery at its origin, mild stenosis of the L vertebral artery at its origin but no internal carotid A stenosis; mod-high grade stenosis of a short segment of the right P1 segment and high grade stenosis or short segment occlusion of the proximal R M2 branch.    ED discussed with neurology - not a candidate for TPA, Has been started on aspirin. Workup this stay, including echo with bubble study, A1C, trops, CRP and TSH were all unremarkable. Per neurology stroke likely result of intracranial stenosis. Recommended continuation of aspirin 81mg daily. FLP noted (, HDL 79, TG 74) though patient intolerant to statins. Evaluated by therapy services -> advised to dc back to care facility with home RN/PT/OT. No SLP needs -> okay for regular diet.     ERIC: Resolved.  Cr 1.18 on admission. No hx of CKD. Cr normalized after  IVFs.    Angelica Briggs    Significant Results and Procedures  1/10 Echocardiogram:  Interpretation Summary     1. The left ventricle is normal in structure, function and size. The visual ejection fraction is estimated at 65%.  2. The right ventricle is normal in structure, function and size.  3. There is no atrial shunt seen. A contrast injection (Bubble Study) was performed that was negative for flow across the interatrial septum.  4. No valve disease.     No previous echo for comparison.    Pending Results  These results will be followed up by PCP  Unresulted Labs Ordered in the Past 30 Days of this Admission     Date and Time Order Name Status Description    1/9/2017 1405 Urine Culture Preliminary           Code Status  Full Code       Primary Care Physician  Drew Hall    Physical Exam  Temp: 98  F (36.7  C) Temp src: Oral BP: 130/73 mmHg   Heart Rate: 60 Resp: 16 SpO2: 95 % O2 Device: None (Room air)    Filed Vitals:    01/09/17 1248 01/10/17 0554   Weight: 50.349 kg (111 lb) 45.7 kg (100 lb 12 oz)     Vital Signs with Ranges  Temp:  [97.9  F (36.6  C)-98.7  F (37.1  C)] 98  F (36.7  C)  Heart Rate:  [57-68] 60  Resp:  [14-16] 16  BP: (130-155)/(60-75) 130/73 mmHg  SpO2:  [94 %-98 %] 95 %  I/O last 3 completed shifts:  In: 240 [P.O.:240]  Out: -     Constitutional: Resting comfortably, alert and conversing appropriately, NAD  Respiratory: CTAB, no wheeze/rales/rhonchi  Cardiovascular: HRRR, no MGR  GI: S, Nt, ND, +BS  Skin/Integumen: warm/dry  Neuro: CNs intact, sensation/strenght intact and w/o focal deficits    Other:   no LE edema    Discharge Disposition  Discharged to home  Condition at discharge: Stable    Consultations This Hospital Stay  NEUROLOGY IP CONSULT  PHYSICAL THERAPY ADULT IP CONSULT  OCCUPATIONAL THERAPY ADULT IP CONSULT  SPEECH LANGUAGE PATH ADULT IP CONSULT    Time Spent on This Encounter  IAngelica, personally saw the patient today and spent greater than  30 minutes discharging this patient.    Discharge Orders    Home care nursing referral     Home Care PT Referral for Hospital Discharge     Home Care OT Referral for Hospital Discharge     Reason for your hospital stay   Evaluation of your vision changes and weakness which was found to be secondary to a stroke.     Follow-up and recommended labs and tests    1. Follow up with PCP in 1-2 weeks. No labs needed.     Activity   Your activity upon discharge: activity as tolerated     MD face to face encounter   Documentation of Face to Face and Certification for Home Health Services    I certify that patient: Rosie Guerrero is under my care and that I, or a nurse practitioner or physician's assistant working with me, had a face-to-face encounter that meets the physician face-to-face encounter requirements with this patient on: 1/10/2017.    This encounter with the patient was in whole, or in part, for the following medical condition, which is the primary reason for home health care: acute ischemic stroke with resulting vision changes and LE weakness, macular degeneration, arthritis.    I certify that, based on my findings, the following services are medically necessary home health services: Nursing, Occupational Therapy and Physical Therapy.    My clinical findings support the need for the above services because: Nurse is needed: to help with medication compliance and VS checks after recent stroke. Occupational Therapy Services are needed to assess and treat cognitive ability and address ADL safety due to impairment in deficits resulting from acute ischemic stroke. Physical Therapy Services are needed to assess and treat the following functional impairments: deficits resulting from acute ischemic stroke.    Further, I certify that my clinical findings support that this patient is homebound (i.e. absences from home require considerable and taxing effort and are for medical reasons or [employer] services or infrequently or  of short duration when for other reasons) because: Leaving home is medically contraindicated for the following reason(s): unable to ambulate for >100ft w/o rest.    Based on the above findings. I certify that this patient is confined to the home and needs intermittent skilled nursing care, physical therapy and/or speech therapy.  The patient is under my care, and I have initiated the establishment of the plan of care.  This patient will be followed by a physician who will periodically review the plan of care.  Physician/Provider to provide follow up care: Drew Hall    Attending hospital physician (the Medicare certified [facility] provider): Angelica Briggs  Physician Signature: See electronic signature associated with these discharge orders.  Date: 1/10/2017     Diet   Follow this diet upon discharge: Regular       Discharge Medications  Current Discharge Medication List      START taking these medications    Details   aspirin EC 81 MG EC tablet Take 1 tablet (81 mg) by mouth daily  Qty: 90 tablet, Refills: 0    Associated Diagnoses: Cerebrovascular accident (CVA), unspecified mechanism (H)         CONTINUE these medications which have NOT CHANGED    Details   calcium carbonate (OS-PATRICIA 500 MG [place]. CA) 500 MG tablet Take 500 mg by mouth daily      magnesium chloride 535 (64 MG) MG TBCR CR tablet Take 535 mg by mouth daily      Psyllium (NATRUL COLON CARE PO) Take by mouth daily      prune juice LIQD Take 5 mLs by mouth daily as needed for constipation      Zinc 15 MG CAPS Take 15 mg by mouth daily      vitamin  B complex with vitamin C (VITAMIN  B COMPLEX) TABS Take 1 tablet by mouth daily  Refills: 0      coenzyme Q-10 10 MG CAPS Qty: 30 capsule      ascorbic acid (VITAMIN C) 500 MG tablet Take by mouth daily  Qty: 30 tablet      ginkgo biloba 60 MG CAPS Take 1 capsule (60 mg) by mouth daily  Qty: 60 capsule      DAILY MULTIPLE VITAMINS OR TABS naturemade mutli + Lutein daily  Refills: 0      VITAMIN  D 400 UNIT OR CAPS 1 CAPSULE DAILY  Qty: 30, Refills: 0      VITAMIN E 400 UNIT OR CAPS ONE CAPSULE DAILY  Qty: 3 MONTHS, Refills: 1 YEAR      FISH -[phone] MG OR CAPS 1 dailoy  Refills: 0           Allergies  Allergies   Allergen Reactions     Lipitor [Hmg-Coa-R Inhibitors]      Severe leg cramps     Penicillin [Penicillins]      Sulfa Drugs      Data  Most Recent 3 CBC's:  Recent Labs   Lab Test  01/10/17   0715  01/09/17   1309  10/16/15   1539   WBC  5.4  6.1  7.6   HGB  11.9  13.6  12.8   MCV  94  96  99   PLT  204  249  264      Most Recent 3 BMP's:  Recent Labs   Lab Test  01/10/17   0715  01/09/17   1309  10/17/16   1002   NA  143  141  140   POTASSIUM  4.3  4.4  4.3   CHLORIDE  112*  106  106   CO2  25  29  30   BUN  22  24  22   CR  1.00  1.18*  1.08*   ANIONGAP  6  6  4   PATRICIA  9.2  10.4*  9.6   GLC  92  96  107*     Most Recent 3 Troponin's:  Recent Labs   Lab Test  01/10/17   0305  01/09/17   1922   TROPI  <0.015  The 99th percentile for upper reference range is 0.045 ug/L.  Troponin values in   the range of 0.045 - 0.120 ug/L may be associated with risks of adverse   clinical events.    <0.015  The 99th percentile for upper reference range is 0.045 ug/L.  Troponin values in   the range of 0.045 - 0.120 ug/L may be associated with risks of adverse   clinical events.       Most Recent Cholesterol Panel:  Recent Labs   Lab Test  01/09/17 1922   CHOL  221*   LDL  127*   HDL  79   TRIG  74     Most Recent 6 Bacteria Isolates From Any Culture (See EPIC Reports for Culture Details):  Recent Labs   Lab Test  01/09/17   1330   CULT  Culture negative < 24 hours, reincubate     Most Recent TSH, T4 and A1c Labs:  Recent Labs   Lab Test  01/09/17 1922   TSH  2.61   A1C  5.5     Results for orders placed or performed during the hospital encounter of 01/09/17   MRA Angiogram Neck w/o & w Contrast    Narrative    MRA ANGIOGRAM NECK WITHOUT AND WITH CONTRAST 1/9/2017 5:00 PM     HISTORY: Vision  changes.    TECHNIQUE: MR angiography was performed through the neck without and  with contrast. 10 mL of Gadavist given. Carotid stenoses were  evaluated by comparing the caliber of the proximal internal carotid  artery to the caliber of the distal internal carotid artery.    FINDINGS: Brachiocephalic vessels are patent off the arch. There is  high-grade stenosis of the proximal right external carotid artery, but  no stenosis of the right common or internal carotid artery. There is  mild-to-moderate atherosclerotic disease involving the left carotid  bifurcation but there is no significant stenosis. Both vertebral  arteries show antegrade flow with the right vertebral artery being  dominant. The left vertebral artery is a congenitally small vessel  with some mild stenosis at its origin.      Impression    IMPRESSION:  1. High-grade stenosis of right external carotid artery at its origin.  2. Mild stenosis of left vertebral artery at its origin. This is the  nondominant vessel.  3. Mild atherosclerotic disease involving both carotid bifurcations  without any significant internal carotid artery stenosis.  4. Widely patent dominant right vertebral artery.    WELLINGTON ZIEGLER MD   MR Head w/o Contrast Angiogram    Narrative    MRA ANGIOGRAM HEAD WITHOUT CONTRAST 1/9/2017 3:56 PM    HISTORY:  Vision changes.    COMPARISON: None.    TECHNIQUE: Routine [place] [facility] [place] MRA.    FINDINGS: There is a short segment moderate-to-severe stenosis of the  right P1 segment of the posterior cerebral artery. There is a  high-grade stenosis or short segment occlusion of a right M2 branch  beginning at its origin and extending 5 or 6 mm. There is flow  distally. Left middle cerebral artery complex appears normal. Both  anterior cerebral arteries appear normal. There is a dominant right  vertebral artery with small caliber but patent left vertebral artery.  This is likely developmental. No evidence for aneurysm.      Impression     IMPRESSION:  1. Moderate to high-grade short segment stenosis right P1 segment.  2. High-grade stenosis or short segment occlusion of proximal portion  of a right M2 branch.    RUDI MORGAN MD   MR Brain w/o & w Contrast    Narrative    MRI BRAIN WITHOUT AND WITH CONTRAST  1/9/2017 4:57 PM    HISTORY:  Vision changes.     TECHNIQUE:  Multiplanar, multisequence MRI of the brain without and  with 10 mL Gadavist.    COMPARISON: None.    FINDINGS: Diffusion-weighted images show several areas of restricted  diffusion in the right parieto-occipital lobe with the largest area  measuring approximately 4 x 2.5 cm. These are consistent with acute  ischemic infarcts. There is no evidence for any intracranial  hemorrhage or any significant mass effect. Cerebral atrophy is  present. Postcontrast images show some minimal hyperemia in the area  of acute ischemic infarction, but otherwise no enhancement. There is  no evidence for any focal mass lesions. Scattered nonspecific white  matter changes are also noted. Vascular structures are patent at the  skull base.      Impression    IMPRESSION:  1. Multiple acute ischemic infarcts in the right parieto-occipital  lobe.  2. Cerebral atrophy with chronic white matter changes.  3. No evidence for intracranial hemorrhage or any focal mass lesions.    WELLINGTON ZIEGLER MD

## 2017-01-10 NOTE — PLAN OF CARE
Problem: Goal Outcome Summary  Goal: Goal Outcome Summary  Outcome: Improving  A&O x4. Blurred vision, more on the left eye. Denied any numbness/tingling. Bilateral LE strength 4/5 . High SBP but still within given parameter . Tele NSR. Regular diet. Up with 1 with belt. Voiding using the bathroom. Denies pain. Plan to have echo tomorrow.

## 2017-01-10 NOTE — PROGRESS NOTES
[facility]          OUTPATIENT SWALLOW  EVALUATION  PLAN OF TREATMENT FOR OUTPATIENT REHABILITATION  (COMPLETE FOR INITIAL CLAIMS ONLY)  Patient's Last Name, First Name, M.I.  YOB: 1928  Rosie Guerrero     Provider's Name   [facility]   Medical Record No.  [id number]     Start of Care Date:   1/10/17   Onset Date:   1/9/17   Type:     ___PT   ____OT  ___X_SLP Medical Diagnosis:  Dysphagia       Treatment Diagnosis:   Dysphagia Visits from SOC:  1     _________________________________________________________________________________  Plan of Treatment/Functional Goals:   Evaluation only, Tx not indicated, at this time.          No name on file.       I CERTIFY THE NEED FOR THESE SERVICES FURNISHED UNDER        THIS PLAN OF TREATMENT AND WHILE UNDER MY CARE     (Physician co-signature of this document indicates review and certification of the therapy plan).                                    Initial Assessment        See Epic Evaluation

## 2017-01-10 NOTE — UTILIZATION REVIEW
"  Admission Status; Secondary Review Determination         Under the authority of the Utilization Management Committee, the utilization review process indicated a secondary review on the above patient.  The review outcome is based on review of the medical records, discussions with staff, and applying clinical experience noted on the date of the review.          (x) Observation Status Appropriate - This patient does not meet hospital inpatient criteria and is placed in observation status. If this patient's primary payer is Medicare and was admitted as an inpatient, Condition Code 44 should be used and patient status changed to \"observation\".     RATIONALE FOR DETERMINATION   Admission note:\"88 year old female who presents with blurred vision and nonspecific weakness.  MRI of the brain shows acute stroke in parieto-occipital area.  Neurology has been consulted.  The patient has been started on aspirin.  The onset of symptoms was five days ago and in fact is getting better today.\" Also patient has Medicare and the order stated expected stay less than 2 midnights.  The severity of illness, intensity of service provided, expected LOS and risk for adverse outcome make the care appropriate for further observation; however, doesn't meet criteria for hospital inpatient admission. This was discussed with attending physician Dr. Briggs who concurred with this determination.    This document was produced using voice recognition software.      The information on this document is developed by the utilization review team in order for the business office to ensure compliance.  This only denotes the appropriateness of proper admission status and does not reflect the quality of care rendered.         The definitions of Inpatient Status and Observation Status used in making the determination above are those provided in the CMS Coverage Manual, Chapter 1 and Chapter 6, section 70.4.      Sincerely,     MARY JIMENEZ MD    System " Medical Director  Utilization Management  [facility].

## 2017-01-10 NOTE — H&P
[facility]    History and Physical  Hospitalist       Date of Admission:  1/9/2017  Date of Service (when I saw the patient): 01/09/2017    Assessment and Plan  Rosie Guerrero is a 88 year old female who presents with blurred vision and nonspecific weakness.  MRI of the brain shows acute stroke in parieto-occipital area.  Neurology has been consulted.  The patient has been started on aspirin.  The onset of symptoms was five days ago and in fact is getting better today.  1.  Admit to telemetry under acute CVA  2.  Acute stroke no indication for thrombolytic  3. Obtain cardiac echo  4.  Start aspirin  5. Await neurology's recommendation  6. The patient specifically asked to be full code and all life saving measure including Defibrillation and Intubation to be done at this moment    Summary:  88 -year-old female presented to the hospital with five days history of blurry vision and nonspecific weakness.  MRI of the brain showed acute parieto-occipital ischemic stroke that she also has some narrowing of vessel in MRA of the brain (see attached report).  She is being admitted for telemetry and under acute distress protocol.    DVT Prophylaxis: Low Risk/Ambulatory with no VTE prophylaxis indicated  Code Status: Full Code    Disposition: Expected discharge in 1-2 days once evaluated by neurology.    Joe Hauser MD    Primary Care Physician  Drew Hall    Chief Complaint  Blurred vision    History is obtained from the patient    History of Present Illness  Rosie Guerrero is a 88 year old female who presents with blurred vision.  She states that woke up five days ago with blurry vision and feeling that the objects were moving..  She also states that her vision was fuzzy.  Four nights ago she was not able to play cards as usual and was weak in lower extremity and ad some unsteadiness in her gait. Her symptoms have been improving today  But the staff at care facility decided to send her to the  emergency room for further evaluation. she denies having any chest pain, no shortness of breath no nausea, no vomiting, no dysuria,no frequency, no urinary urgency.  All other review of system besides what I mentioned are negative.    Past Medical History   I have reviewed this patient's medical history and updated it with pertinent information if needed.   Past Medical History   Diagnosis Date     Other malignant neoplasm of skin of trunk, except scrotum      Multiple Basal cell CA excisions     Other and unspecified hyperlipidemia      Macular degeneration (senile) of retina, unspecified      Generalized osteoarthrosis, unspecified site      Hx of cervicalgia, and sciatica, resolved with PT     Rubella without mention of complication      Past history of Rubella       Past Surgical History  I have reviewed this patient's surgical history and updated it with pertinent information if needed.  Past Surgical History   Procedure Laterality Date     Hysterectomy, fuad       With BSO, secondary to fibroids     Hc remove tonsils/adenoids,<11 y/o       Hc colonoscopy thru stoma, diagnostic       NORMAL     Flex sig (medicare pt.)       NORMAL       Prior to Admission Medications  Prior to Admission Medications   Prescriptions Last Dose Informant Patient Reported? Taking?   DAILY MULTIPLE VITAMINS OR TABS 2017 at am  Yes Yes   Sig: naturemade mutli + Lutein daily   FISH -[id number] MG OR CAPS 2017 at am  Yes Yes   Si dailoy   Psyllium (NATRUL COLON CARE PO) 2017 at 1800  Yes Yes   Sig: Take by mouth daily   VITAMIN D 400 UNIT OR CAPS 2017 at 1200  Yes Yes   Si CAPSULE DAILY   VITAMIN E 400 UNIT OR CAPS 2017 at 1800  Yes Yes   Sig: ONE CAPSULE DAILY   Zinc 15 MG CAPS 2017 at 1800  Yes Yes   Sig: Take 15 mg by mouth daily   ascorbic acid (VITAMIN C) 500 MG tablet 2017 at am  Yes Yes   Sig: Take by mouth daily   calcium carbonate (OS-PATRICIA 500 MG [place]. CA) 500 MG tablet  2017 at 1200  Yes Yes   Sig: Take 500 mg by mouth daily   coenzyme Q-10 10 MG CAPS 2017 at 1800  Yes Yes   ginkgo biloba 60 MG CAPS 2017 at 1200  Yes Yes   Sig: Take 1 capsule (60 mg) by mouth daily   magnesium chloride 535 (64 MG) MG TBCR CR tablet 2017 at 1200  Yes Yes   Sig: Take 535 mg by mouth daily   prune juice LIQD 2017 at Unknown time  Yes Yes   Sig: Take 5 mLs by mouth daily as needed for constipation   vitamin  B complex with vitamin C (VITAMIN  B COMPLEX) TABS 2017 at am  Yes Yes   Sig: Take 1 tablet by mouth daily      Facility-Administered Medications: None     Allergies  Allergies   Allergen Reactions     Lipitor [Hmg-Coa-R Inhibitors]      Severe leg cramps     Penicillin [Penicillins]      Sulfa Drugs        Social History  I have reviewed this patient's social history and updated it with pertinent information if needed. Rosie Guerrero  reports that she quit smoking about 43 years ago. She does not have any smokeless tobacco history on file. She reports that she drinks alcohol. She reports that she does not use illicit drugs.    Family History  I have reviewed this patient's family history and updated it with pertinent information if needed.   Family History   Problem Relation Age of Onset     Neurologic Disorder Mother      Cerebrovascular disease, dementia,  age 97     CEREBROVASCULAR DISEASE Father       age 79       Review of Systems  The 10 point Review of Systems is negative other than noted in the HPI .    Physical Exam  Temp: 98.3  F (36.8  C) Temp src: Oral BP: 148/70 mmHg   Heart Rate: 67 Resp: 14 SpO2: 97 % O2 Device: None (Room air)    Vital Signs with Ranges  Temp:  [98.3  F (36.8  C)] 98.3  F (36.8  C)  Heart Rate:  [67-68] 67  Resp:  [14] 14  BP: (140-152)/(63-73) 148/70 mmHg  SpO2:  [94 %-98 %] 97 %  111 lbs 0 oz    Constitutional: awake, alert, cooperative, no apparent distress, and appears stated age  Eyes: Lids and lashes normal, pupils equal, round  and reactive to light, extra ocular muscles intact, sclera clear, conjunctiva normal and Her vision is blurred but does not seem to have any problem with acuity or double vision. Strikingly her Visual examination was unremarkable.  ENT: Normocephalic, without obvious abnormality, atraumatic, sinuses nontender on palpation, external ears without lesions, oral pharynx with moist mucous membranes, tonsils without erythema or exudates, gums normal and good dentition.  Hematologic / Lymphatic: no cervical lymphadenopathy  Respiratory: No increased work of breathing, good air exchange, clear to auscultation bilaterally, no crackles or wheezing  Cardiovascular: Normal apical impulse, regular rate and rhythm, normal S1 and S2, no S3 or S4, and no murmur noted  GI: No scars, normal bowel sounds, soft, non-distended, non-tender, no masses palpated, no hepatosplenomegally  Skin: There is macular 1 inch lesion on anterior right chest, no bruising or bleeding, normal skin color, texture, turgor and no redness, warmth, or swelling  Musculoskeletal: There is no redness, warmth, or swelling of the joints.  Full range of motion noted.  Motor strength is 5 out of 5 all extremities bilaterally.  Tone is normal.  Neurologic: Awake, alert, oriented to name, place and time.  Cranial nerves II-XII are grossly intact.  Motor is 5 out of 5 bilaterally.  Cerebellar finger to nose, heel to shin intact.  Sensory is intact.  Babinski down going, Romberg negative, and gait is normal.  Mental Status Exam:  Level of Alertness:   awake  Orientation:   person, place, time  Memory:   normal  Attention/Concentration:  normal  Cranial Nerves:  cranial nerves II-XII are grossly intact  Motor Exam:  moves all extremities well and symmetrically  Neuropsychiatric: General: normal, calm and normal eye contact    Data  Data reviewed today: I have personally reviewed all of her lab, EKG and MRI and MRA report    Recent Labs  Lab 01/09/17  1309   WBC 6.1   HGB  13.6   MCV 96         POTASSIUM 4.4   CHLORIDE 106   CO2 29   BUN 24   CR 1.18*   ANIONGAP 6   PATRICIA 10.4*   GLC 96       Recent Results (from the past 24 hour(s))   MR Head w/o Contrast Angiogram    Narrative    MRA ANGIOGRAM HEAD WITHOUT CONTRAST 1/9/2017 3:56 PM    HISTORY:  Vision changes.    COMPARISON: None.    TECHNIQUE: Routine [place] [facility] [place] MRA.    FINDINGS: There is a short segment moderate-to-severe stenosis of the  right P1 segment of the posterior cerebral artery. There is a  high-grade stenosis or short segment occlusion of a right M2 branch  beginning at its origin and extending 5 or 6 mm. There is flow  distally. Left middle cerebral artery complex appears normal. Both  anterior cerebral arteries appear normal. There is a dominant right  vertebral artery with small caliber but patent left vertebral artery.  This is likely developmental. No evidence for aneurysm.      Impression    IMPRESSION:  1. Moderate to high-grade short segment stenosis right P1 segment.  2. High-grade stenosis or short segment occlusion of proximal portion  of a right M2 branch.    RUDI MORGAN MD   MR Brain w/o & w Contrast    Narrative    MRI BRAIN WITHOUT AND WITH CONTRAST  1/9/2017 4:57 PM    HISTORY:  Vision changes.     TECHNIQUE:  Multiplanar, multisequence MRI of the brain without and  with 10 mL Gadavist.    COMPARISON: None.    FINDINGS: Diffusion-weighted images show several areas of restricted  diffusion in the right parieto-occipital lobe with the largest area  measuring approximately 4 x 2.5 cm. These are consistent with acute  ischemic infarcts. There is no evidence for any intracranial  hemorrhage or any significant mass effect. Cerebral atrophy is  present. Postcontrast images show some minimal hyperemia in the area  of acute ischemic infarction, but otherwise no enhancement. There is  no evidence for any focal mass lesions. Scattered nonspecific white  matter changes are also noted. Vascular  structures are patent at the  skull base.      Impression    IMPRESSION:  1. Multiple acute ischemic infarcts in the right parieto-occipital  lobe.  2. Cerebral atrophy with chronic white matter changes.  3. No evidence for intracranial hemorrhage or any focal mass lesions.   MRA Angiogram Neck w/o & w Contrast    Narrative    MRA ANGIOGRAM NECK WITHOUT AND WITH CONTRAST 1/9/2017 5:00 PM     HISTORY: Vision changes.    TECHNIQUE: MR angiography was performed through the neck without and  with contrast. 10 mL of Gadavist given. Carotid stenoses were  evaluated by comparing the caliber of the proximal internal carotid  artery to the caliber of the distal internal carotid artery.    FINDINGS: Brachiocephalic vessels are patent off the arch. There is  high-grade stenosis of the proximal right external carotid artery, but  no stenosis of the right common or internal carotid artery. There is  mild-to-moderate atherosclerotic disease involving the left carotid  bifurcation but there is no significant stenosis. Both vertebral  arteries show antegrade flow with the right vertebral artery being  dominant. The left vertebral artery is a congenitally small vessel  with some mild stenosis at its origin.      Impression    IMPRESSION:  1. High-grade stenosis of right external carotid artery at its origin.  2. Mild stenosis of left vertebral artery at its origin. This is the  nondominant vessel.  3. Mild atherosclerotic disease involving both carotid bifurcations  without any significant internal carotid artery stenosis.  4. Widely patent dominant right vertebral artery.

## 2017-01-10 NOTE — PROGRESS NOTES
" 01/10/17 1007   Quick Adds   Type of Visit Initial Occupational Therapy Evaluation   Living Environment   Lives With alone   Living Arrangements independent living facility   Home Accessibility grab bars present (toilet)   Transportation Available car;public transportation;family or friend will provide  (Pt no longer drives. )   Self-Care   Dominant Hand right   Usual Activity Tolerance good   Current Activity Tolerance moderate   Equipment Currently Used at Home grab bar;cane, straight   Functional Level Prior   Ambulation 1-->assistive equipment   Transferring 1-->assistive equipment   Toileting 1-->assistive equipment   Bathing 1-->assistive equipment   Dressing 0-->independent   Eating 0-->independent   Communication 0-->understands/communicates without difficulty   Swallowing 0-->swallows foods/liquids without difficulty   Cognition 0 - no cognition issues reported   Fall history within last six months no   General Information   Onset of Illness/Injury or Date of Surgery - Date 01/09/17   Referring Physician WILMER Hauser MD   Patient/Family Goals Statement Pt insisted on returning home today for \"5pm birthday party.\"    Additional Occupational Profile Info/Pertinent History of Current Problem Admitted under observation for new onset of L visual changes. Imaging indicated multiple R parieto-occipital lobe.    Precautions/Limitations fall precautions   Cognitive Status Examination   Orientation orientation to person, place and time   Level of Consciousness alert;confused   Able to Follow Commands WNL/WFL   Personal Safety (Cognitive) at risk behaviors demonstrated;decreased insight to deficits   Memory impaired   Visual Perception   Visual Perception Wears glasses   Visual Perception Comments Pt reports blurred vision, primarily out of R eye. Tracking intact. Per chart and per pt, baseline MD.    Sensory Examination   Sensory Quick Adds Light touch;No deficits were identified   Sensory Comments Denies B UE " "numbness and tingling   Sensation Light Touch, Rehab Eval   LUE within normal limits   RUE within normal limits   Pain Assessment   Patient Currently in Pain No   Range of Motion (ROM)   ROM Quick Adds No deficits were identified   ROM Comment B UE WNL   Strength   Manual Muscle Testing Quick Adds No deficits were identified   Strength Comments B UE 5/5 on MMT   Hand Strength   Hand Strength Comments B grasp WNL   Coordination   Upper Extremity Coordination Left UE impaired   Coordination Comments Per pt, \"I feel like my perception isn't there.\"    Transfer Skills   Transfer Transfer Safety Analysis Bed/Chair;Transfer Skill: Stand to Sit;Transfer Safety Analysis Sit/Stand   Transfer Skill: Bed to Chair/Chair to Bed   Level of Miller: Bed to Chair stand-by assist   Assistive Device - Transfer Skill Bed to Chair Chair to Bed Rehab Eval straight cane   Transfer Safety Analysis Bed/Chair   Transfer Safety Concerns Noted decreased balance during turns   Impairments Contributing to Impaired Transfers impaired balance   Transfer Skill: Sit to Stand   Level of [place]: Sit/Stand stand-by assist   Assistive Device for Transfer: Sit/Stand straight cane   Transfer Safety Analysis Sit/Stand   Transfer Safety Concerns Noted: Sit/Stand decreased balance during turns   Impaired Transfers: Sit/Stand impaired balance   Toilet Transfer   Toilet Transfer Toilet Transfer Skill;Toilet Transfer Safety Analysis   Transfer Skill: Toilet Transfer   Level of Miller: Toilet stand-by assist   Assistive Device seat riser;grab bars;straight cane   Transfer Safety Analysis Toilet   Transfer Safety Analysis Toilet impaired balance   Lower Body Dressing   Level of [place]: Dress Lower Body stand-by assist   Toileting   Level of Miller: Toilet stand-by assist   Grooming   Level of Miller: Grooming stand-by assist   Eating/Self Feeding   Level of Miller: Eating independent   Instrumental Activities of Daily " "Living (IADL)   Previous Responsibilities meal prep;laundry;medication management;finances   IADL Comments Pt receives one meal daily at facility.    Activities of Daily Living Analysis   Impairments Contributing to Impaired Activities of Daily Living balance impaired;cognition impaired;coordination impaired   General Therapy Interventions   Planned Therapy Interventions ADL retraining;cognition;neuromuscular re-education;transfer training   Clinical Impression   Criteria for Skilled Therapeutic Interventions Met yes, treatment indicated   OT Diagnosis Decreased I and safety with ADLs   Influenced by the following impairments Visual changes; Impaired safety; Decreased balance   Assessment of Occupational Performance 1-3 Performance Deficits   Identified Performance Deficits Visual changes; Impaired safety; Decreased balance   Clinical Decision Making (Complexity) Low complexity   Therapy Frequency 3 times/wk   Predicted Duration of Therapy Intervention (days/wks) 7 days   Anticipated Discharge Disposition Home with Assist;Home with Home Therapy   Risks and Benefits of Treatment have been explained. Yes   Patient, Family & other staff in agreement with plan of care Yes   [facility] TM \"6 Clicks\"   2016, Trustees of [facility], under license to [employer].  All rights reserved.   6 Clicks Short Forms Daily Activity Inpatient Short Form   [facility]  \"6 Clicks\" Daily Activity Inpatient Short Form   1. Putting on and taking off regular lower body clothing? 3 - A Little   2. Bathing (including washing, rinsing, drying)? 3 - A Little   3. Toileting, which includes using toilet, bedpan or urinal? 3 - A Little   4. Putting on and taking off regular upper body clothing? 3 - A Little   5. Taking care of personal grooming such as brushing teeth? 3 - A Little   6. Eating meals? 4 - None   Daily Activity Raw Score (Score out of 24.Lower scores equate to lower levels of function) 19   Total " Evaluation Time   Total Evaluation Time (Minutes) 12

## 2017-01-10 NOTE — PLAN OF CARE
Problem: Goal Outcome Summary  Goal: Goal Outcome Summary  PT-Per OT, appropriate to defer PT to home health care. Patient adamant about discharging today. Will complete PT order.

## 2017-01-10 NOTE — PLAN OF CARE
Problem: Goal Outcome Summary  Goal: Goal Outcome Summary  Outcome: Adequate for Discharge Date Met:  01/10/17  A&O times 4. Neuros intact, blurred vision, improving per patient. VSS. Tele NSR. Regular diet, good appeettie. Up with SBA, walking to bathroom, voiding adequatelty. Denies pain. Plan to discharge back to care facility this afternoon.

## 2017-01-10 NOTE — PLAN OF CARE
Problem: Goal Outcome Summary  Goal: Goal Outcome Summary  SLP:  Bedside swallow evaluation completed.  Pt alert and cooperative, speech intelligible, no word finding deficits noted during conversation.  Pt's oral-motor skills intact.  Pt showed safe swallow with ice chips, water by spoon and cup.  Pt able to feed herself applesauce and ilana crackers WFL. Prompt swallows noted, no overt signs of aspiration noted, good oral clearing noted.  Recommendations: 1. Regular diet with thin liquids, Pt to sit upright at 90 degrees for all meals.  2.  No further speech services are indicated at this time and consult orders will be completed.  3.  Per speech perspective discharge back to previous setting.

## 2017-01-10 NOTE — PLAN OF CARE
Problem: Goal Outcome Summary  Goal: Goal Outcome Summary  OT: Eval complete and Tx initiated. Pt admitted under observation for visual changes due to R parieto-occipital lobe infarcts. Prior to admit, pt lives alone in [facility] and reports mod I with ADLs, med mgmt, and laundry. Currently, pt requires SBA with SEC for functional transfers and ADLs. Pt limited by impaired safety, decreased balance, and visual changes; thus, pt would benefit from continued OT intervention to ensure safety with ADL/IADLs. Pt adamant about discharging home today. Upon discharge home, recommend home RN for med mgmt and home OT and PT for ongoing intervention.

## 2017-01-11 ENCOUNTER — TELEPHONE (OUTPATIENT)
Dept: INTERNAL MEDICINE | Facility: CLINIC | Age: 82
End: 2017-01-11

## 2017-01-11 NOTE — PLAN OF CARE
Problem: Goal Outcome Summary  Goal: Goal Outcome Summary  Occupational Therapy Discharge Summary    Reason for therapy discharge:    Discharged to home with home therapy.    Progress towards therapy goal(s). See goals on [facility] Plan [facility] electronic [facility] record for goal details.  Goals not met.  Barriers to achieving goals:   discharge from facility and discharge on same date as initial evaluation.    Therapy recommendation(s):    Continued therapy is recommended.  Rationale/Recommendations:  Pt would benefit from home OT/PT/RN upon discharge back to [facility]..

## 2017-01-11 NOTE — TELEPHONE ENCOUNTER
"ED/Discharge Protocol    \"Hi, my name is Kiki Hernandez, a registered nurse, and I am calling on behalf of Dr. Hall's office at [place].  I am calling to follow up and see how things are going for you after your recent visit.\"    \"I see that you were in the (ER/UC/IP) on 1/9/16.    How are you doing now that you are home?\" Doing pretty well, visiting with home care nurse for intake now.    Is patient experiencing symptoms that may require a hospital visit?  no    Discharge Instructions    \"Let's review your discharge instructions.  What is/are the follow-up recommendations?  Pt. Response: have home care nurse/OT/PT services, follow up with opthomology and with neurology    \"Were you instructed to make a follow-up appointment?\"  Pt. Response: Yes.  Has appointment been made?   Yes      \"When you see the provider, I would recommend that you bring your discharge instructions with you.    Medications    \"How many new medications are you on since your hospitalization/ED visit?\"    0-1  \"How many of your current medicines changed (dose, timing, name, etc.) while you were in the hospital/ED visit?\"   0-1  \"Do you have questions about your medications?\"   No  \"Were you newly diagnosed with heart failure, COPD, diabetes or did you have a heart attack?\"   No  For patients on insulin: \"Did you start on insulin in the hospital or did you have your insulin dose changed?\"   No    Medication reconciliation completed? Yes    Was MTM referral placed (*Make sure to put transitions as reason for referral)?   No    Call Summary    \"Do you have any questions or concerns about your condition or care plan at the moment?\"    No  Triage nurse advice given: call with questions/concerns    Patient was in ER 0 in the past year (assess appropriateness of ER visits.)      \"If you have questions or things don't continue to improve, we encourage you contact us through the main clinic number,  [phone].  Even if the clinic is not open, " "triage nurses are available 24/7 to help you.     We would like you to know that our clinic has extended hours (provide information).  We also have urgent care (provide details on closest location and hours/contact info)\"      \"Thank you for your time and take care!\"        "

## 2017-01-19 ENCOUNTER — OFFICE VISIT (OUTPATIENT)
Dept: INTERNAL MEDICINE | Facility: CLINIC | Age: 82
End: 2017-01-19
Payer: COMMERCIAL

## 2017-01-19 VITALS
TEMPERATURE: 97.4 F | HEART RATE: 69 BPM | OXYGEN SATURATION: 97 % | WEIGHT: 107.2 LBS | SYSTOLIC BLOOD PRESSURE: 126 MMHG | BODY MASS INDEX: 17.86 KG/M2 | HEIGHT: 65 IN | DIASTOLIC BLOOD PRESSURE: 68 MMHG

## 2017-01-19 DIAGNOSIS — I63.9 CEREBROVASCULAR ACCIDENT (CVA), UNSPECIFIED MECHANISM (H): Primary | ICD-10-CM

## 2017-01-19 PROCEDURE — 99213 OFFICE O/P EST LOW 20 MIN: CPT | Performed by: INTERNAL MEDICINE

## 2017-01-19 NOTE — PROGRESS NOTES
"  SUBJECTIVE:                                                    Rosie Guerrero is a 88 year old female who presents to clinic today for the following health issues:        Hospital Follow-up Visit:    Hospital/Nursing Home/IP Rehab Facility: [facility]  Date of Admission: 1/9/17  Date of Discharge: 1/10/17  Reason(s) for Admission: Stroke            Problems taking medications regularly:  None       Medication changes since discharge: None       Problems adhering to non-medication therapy:  None    Summary of hospitalization:  [facility] discharge summary reviewed  Diagnostic Tests/Treatments reviewed.  Follow up needed: none  Other Healthcare Providers Involved in Patient s Care:         None  Update since discharge: improved.     Post Discharge Medication Reconciliation: discharge medications reconciled, continue medications without change.  Plan of care communicated with patient     Coding guidelines for this visit:  Type of Medical   Decision Making Face-to-Face Visit       within 7 Days of discharge Face-to-Face Visit        within 14 days of discharge   Moderate Complexity [id number]   High Complexity [id number]                Problem list and histories reviewed & adjusted, as indicated.  Additional history: as documented        ROS:  Constitutional, HEENT, cardiovascular, pulmonary, gi and gu systems are negative, except as otherwise noted.    OBJECTIVE:                                                    /68 mmHg  Pulse 69  Temp(Src) 97.4  F (36.3  C) (Oral)  Ht 5' 4.75\" (1.645 m)  Wt 107 lb 3.2 oz (48.626 kg)  BMI 17.97 kg/m2  SpO2 97%  Breastfeeding? No  Body mass index is 17.97 kg/(m^2).  GENERAL: healthy, alert and no distress  NECK: no adenopathy, no asymmetry, masses, or scars and thyroid normal to palpation  RESP: lungs clear to auscultation - no rales, rhonchi or wheezes  CV: regular rate and rhythm, normal S1 S2, no S3 or S4, no murmur, click or rub, no " peripheral edema and peripheral pulses strong  ABDOMEN: soft, nontender, no hepatosplenomegaly, no masses and bowel sounds normal  MS: no gross musculoskeletal defects noted, no edema    Diagnostic Test Results:  none      ASSESSMENT/PLAN:                                                      1. Cerebrovascular accident (CVA), unspecified mechanism (H)    - order for DME; Equipment being ordered: Wheeled walker  Dispense: 1 Units; Refill: 0        Drew Hall MD  [facility]

## 2017-01-19 NOTE — NURSING NOTE
"Chief Complaint   Patient presents with     Hospital F/U     stroke       Initial /68 mmHg  Pulse 69  Temp(Src) 97.4  F (36.3  C) (Oral)  Ht 5' 4.75\" (1.645 m)  Wt 107 lb 3.2 oz (48.626 kg)  BMI 17.97 kg/m2  SpO2 97%  Breastfeeding? No Estimated body mass index is 17.97 kg/(m^2) as calculated from the following:    Height as of this encounter: 5' 4.75\" (1.645 m).    Weight as of this encounter: 107 lb 3.2 oz (48.626 kg).  BP completed using cuff size: mateus Mercado CMA ([facility])      "

## 2017-01-26 ENCOUNTER — TELEPHONE (OUTPATIENT)
Dept: INTERNAL MEDICINE | Facility: CLINIC | Age: 82
End: 2017-01-26

## 2017-01-26 NOTE — TELEPHONE ENCOUNTER
Melita, Home Health Care Nurse called and patient has cold and coughing and was wonder if she could take Musinex.  02 stats were 98%, Lungs clear, Bp good 133/74 and no fever.  Melita can be reached at [phone]

## 2017-02-01 DIAGNOSIS — Z53.9 DIAGNOSIS NOT YET DEFINED: Primary | ICD-10-CM

## 2017-02-01 PROCEDURE — G0180 MD CERTIFICATION HHA PATIENT: HCPCS | Performed by: INTERNAL MEDICINE

## 2017-03-03 DIAGNOSIS — Z53.9 DIAGNOSIS NOT YET DEFINED: Primary | ICD-10-CM

## 2017-03-03 PROCEDURE — G0180 MD CERTIFICATION HHA PATIENT: HCPCS | Performed by: INTERNAL MEDICINE

## 2017-08-09 ENCOUNTER — OFFICE VISIT (OUTPATIENT)
Dept: INTERNAL MEDICINE | Facility: CLINIC | Age: 82
End: 2017-08-09
Payer: COMMERCIAL

## 2017-08-09 VITALS
DIASTOLIC BLOOD PRESSURE: 72 MMHG | WEIGHT: 103.1 LBS | BODY MASS INDEX: 17.29 KG/M2 | TEMPERATURE: 97.6 F | OXYGEN SATURATION: 97 % | SYSTOLIC BLOOD PRESSURE: 134 MMHG | HEART RATE: 62 BPM

## 2017-08-09 DIAGNOSIS — I63.9 CEREBROVASCULAR ACCIDENT (CVA), UNSPECIFIED MECHANISM (H): Primary | ICD-10-CM

## 2017-08-09 PROCEDURE — 99213 OFFICE O/P EST LOW 20 MIN: CPT | Performed by: INTERNAL MEDICINE

## 2017-08-09 NOTE — PROGRESS NOTES
SUBJECTIVE:                                                    Rosie Guerrero is a 89 year old female who presents to clinic today for the following health issues:      Patient had a stoke about 6 months ago. Patient is here for a 6 month follow-up. Patient also reports that she has a ache in the back that comes when she sits to long or stands to long, also has similar pain in the calves. Patient states she does use the hot tub in her building when she is having the pain.         Problem list and histories reviewed & adjusted, as indicated.  Additional history: as documented    For unclear reasons, patient has not been taking her aspirin daily for last few months.  To review, MRI performed in January of this year showed multiple right-sided parieto-occipital infarcts.    Reviewed and updated as needed this visit by clinical staff  Tobacco  Allergies  Med Hx  Surg Hx  Fam Hx  Soc Hx      Reviewed and updated as needed this visit by Provider         ROS:  Constitutional, HEENT, cardiovascular, pulmonary, gi and gu systems are negative, except as otherwise noted.      OBJECTIVE:   /72 (BP Location: Left arm, Patient Position: Chair, Cuff Size: Adult Regular)  Pulse 62  Temp 97.6  F (36.4  C) (Oral)  Wt 103 lb 1.6 oz (46.8 kg)  SpO2 97%  BMI 17.29 kg/m2  Body mass index is 17.29 kg/(m^2).  GENERAL: healthy, alert and no distress  NECK: no adenopathy, no asymmetry, masses, or scars and thyroid normal to palpation  RESP: lungs clear to auscultation - no rales, rhonchi or wheezes  CV: regular rate and rhythm, normal S1 S2, no S3 or S4, no murmur, click or rub, no peripheral edema and peripheral pulses strong  ABDOMEN: soft, nontender, no hepatosplenomegaly, no masses and bowel sounds normal  MS: no gross musculoskeletal defects noted, no edema        ASSESSMENT/PLAN:     1. Cerebrovascular accident (CVA), unspecified mechanism (H)  Definitely needs to be taking ASA daily         Drew Hall,  MD  [facility]

## 2017-08-09 NOTE — MR AVS SNAPSHOT
"              After Visit Summary   2017    Rosie Guerrero    MRN: [id number]           Patient Information     Date Of Birth          1928        Visit Information        Provider Department      2017 10:30 AM Drew Hall MD [facility]        Today's Diagnoses     Cerebrovascular accident (CVA), unspecified mechanism (H)    -  1      Care Instructions    - Start taking one baby aspirin daily (81 mg).  (Available over-the-counter)           Follow-ups after your visit        Who to contact     If you have questions or need follow up information about today's clinic visit or your schedule please contact [facility] directly at [phone].  Normal or non-critical lab and imaging results will be communicated to you by MyChart, letter or phone within 4 business days after the clinic has received the results. If you do not hear from us within 7 days, please contact the clinic through [email or web address]hart or phone. If you have a critical or abnormal lab result, we will notify you by phone as soon as possible.  Submit refill requests through [employer] or call your pharmacy and they will forward the refill request to us. Please allow 3 business days for your refill to be completed.          Additional Information About Your Visit        MyChart Information     [employer] lets you send messages to your doctor, view your test results, renew your prescriptions, schedule appointments and more. To sign up, go to [email or web address] . Click on \"Log in\" on the left side of the screen, which will take you to the Welcome page. Then click on \"Sign up Now\" on the right side of the page.     You will be asked to enter the access code listed below, as well as some personal information. Please follow the directions to create your username and password.     Your access code is: [email or web address]  Expires: 2017  3:47 PM     Your access code will  in 90 days. If you need help " or a new code, please call your [place] clinic or [phone].        Care EveryWhere ID     This is your Care EveryWhere ID. This could be used by other organizations to access your [place] medical records  [id number]        Your Vitals Were     Pulse Temperature Pulse Oximetry BMI (Body Mass Index)          62 97.6  F (36.4  C) (Oral) 97% 17.29 kg/m2         Blood Pressure from Last 3 Encounters:   08/09/17 134/72   01/19/17 126/68   01/10/17 165/77    Weight from Last 3 Encounters:   08/09/17 103 lb 1.6 oz (46.8 kg)   01/19/17 107 lb 3.2 oz (48.6 kg)   01/10/17 100 lb 12 oz (45.7 kg)              Today, you had the following     No orders found for display       Primary Care Provider Office Phone # Fax #    Drew Hall [phone]       [address] [address]        Equal Access to Services     ELIAS DAVILA : Hadii aad ku hadasho Soomaali, waaxda luqadaha, qaybta kaalmada adeegyada, waxay balwinder hayrosibel hollis . So [facility] [phone].    ATENCIÓN: Si habla español, tiene a rodriguez disposición servicios gratuitos de asistencia lingüística. Llame al [phone].    We comply with applicable federal civil rights laws and [place] laws. We do not discriminate on the basis of race, color, national origin, age, disability sex, sexual orientation or gender identity.            Thank you!     Thank you for choosing [facility]  for your care. Our goal is always to provide you with excellent care. Hearing back from our patients is one way we can continue to improve our services. Please take a few minutes to complete the written survey that you may receive in the mail after your visit with us. Thank you!             Your Updated Medication List - Protect others around you: Learn how to safely use, store and throw away your medicines at www.disposemymeds.org.          This list is accurate as of: 8/9/17  3:47 PM.  Always use your most recent med  list.                   Brand Name Dispense Instructions for use Diagnosis    ascorbic acid 500 MG tablet    VITAMIN C    30 tablet    Take by mouth daily        aspirin 81 MG EC tablet     90 tablet    Take 1 tablet (81 mg) by mouth daily    Cerebrovascular accident (CVA), unspecified mechanism (H)       calcium carbonate 1250 MG tablet    OS-PATRICIA 500 mg [place]. Ca     Take 500 mg by mouth daily        coenzyme Q-10 10 MG Caps     30 capsule         DAILY MULTIPLE VITAMINS Tabs      naturemade mutli + Lutein daily        FISH -325-3513 MG Caps      1 dailoy        ginkgo biloba 60 MG Caps capsule     60 capsule    Take 1 capsule (60 mg) by mouth daily        LUTEIN PO      Take 40 mg by mouth daily        magnesium chloride 535 (64 MG) MG Tbcr CR tablet      Take 535 mg by mouth daily        order for DME     1 Units    Equipment being ordered: Wheeled walker    Cerebrovascular accident (CVA), unspecified mechanism (H)       prune juice Liqd      Take 5 mLs by mouth daily as needed for constipation        vitamin B complex with vitamin C Tabs tablet      Take 1 tablet by mouth daily        vitamin D 400 UNITS capsule     30    1 CAPSULE DAILY        vitamin E 400 UNIT capsule     3 MONTHS    ONE CAPSULE DAILY        Zinc 15 MG Caps      Take 15 mg by mouth daily

## 2017-08-09 NOTE — NURSING NOTE
"Chief Complaint   Patient presents with     RECHECK     6 month check up       Initial /72 (BP Location: Left arm, Patient Position: Chair, Cuff Size: Adult Regular)  Pulse 62  Temp 97.6  F (36.4  C) (Oral)  Wt 103 lb 1.6 oz (46.8 kg)  SpO2 97%  BMI 17.29 kg/m2 Estimated body mass index is 17.29 kg/(m^2) as calculated from the following:    Height as of 1/19/17: 5' 4.75\" (1.645 m).    Weight as of this encounter: 103 lb 1.6 oz (46.8 kg).  Medication Reconciliation: complete    "

## 2018-04-20 NOTE — PROGRESS NOTES
SUBJECTIVE:   Rosie Guerrero is a 89 year old female who presents to clinic today for the following health issues:      Cerebrovascular Follow-up      Patient history: Cerebrovascular accident 1/9/2017, f/u on 8/9/17    Residual symptoms: Dizziness and back hurts. Pt states that she is aging and is starting to feel it    Worsened or new symptoms since last visit:  YES with dizziness and her lower back hurts, having a hard time bending over to do daily activities such as bending to brush teeth or do laundry. Eye sight is getting worse    Daily aspirin use: Yes    Hypertension controlled: Has a record from the past months       Amount of exercise or physical activity: 2-3 days/week for an average of 30-45 minutes    Problems taking medications regularly: No    Medication side effects: none    Diet: regular (no restrictions)            Problem list and histories reviewed & adjusted, as indicated.  Additional history:     Functionally, patient seems to be doing relatively well.  She is still in independent living, is becoming slightly more dizzy and weak.  She is strongly considering moving to an assisted living facility.  She has started to make inquiries with her insurance company in this regard.    Complaining of bilateral low back pain, with some radicular pain down her left leg.    Reviewed and updated as needed this visit by clinical staff       Reviewed and updated as needed this visit by Provider         ROS:  Constitutional, HEENT, cardiovascular, pulmonary, GI, , musculoskeletal, neuro, skin, endocrine and psych systems are negative, except as otherwise noted.    OBJECTIVE:     /80  Pulse 64  Temp 97.7  F (36.5  C) (Oral)  Resp 16  SpO2 98%  There is no height or weight on file to calculate BMI.  GENERAL: healthy, alert and no distress  NECK: no adenopathy, no asymmetry, masses, or scars and thyroid normal to palpation  RESP: lungs clear to auscultation - no rales, rhonchi or wheezes  CV: regular  rate and rhythm, normal S1 S2, no S3 or S4, no murmur, click or rub, no peripheral edema and peripheral pulses strong  ABDOMEN: soft, nontender, no hepatosplenomegaly, no masses and bowel sounds normal  MS: no gross musculoskeletal defects noted, no edema        ASSESSMENT/PLAN:       1. Cerebrovascular accident (CVA), unspecified mechanism (H)  Continue aspirin monotherapy.  Blood pressure acceptable.  Will make our  aware of this case, to provide any assistance with navigating transition of living situation.  - aspirin 81 MG EC tablet; Take 1 tablet (81 mg) by mouth daily  Dispense: 90 tablet; Refill: 3  - Basic metabolic panel  (Ca, Cl, CO2, Creat, Gluc, K, Na, BUN)  - CBC with platelets    2. Bilateral low back pain with left-sided sciatica, unspecified chronicity  Trial of Medrol Dosepak.  - methylPREDNISolone (MEDROL DOSEPAK) 4 MG tablet; Follow package instructions  Dispense: 21 tablet; Refill: 0        Drew Hall MD  [facility]

## 2018-04-23 ENCOUNTER — OFFICE VISIT (OUTPATIENT)
Dept: INTERNAL MEDICINE | Facility: CLINIC | Age: 83
End: 2018-04-23
Payer: COMMERCIAL

## 2018-04-23 VITALS
TEMPERATURE: 97.7 F | OXYGEN SATURATION: 98 % | DIASTOLIC BLOOD PRESSURE: 80 MMHG | HEART RATE: 64 BPM | RESPIRATION RATE: 16 BRPM | SYSTOLIC BLOOD PRESSURE: 140 MMHG

## 2018-04-23 DIAGNOSIS — I63.9 CEREBROVASCULAR ACCIDENT (CVA), UNSPECIFIED MECHANISM (H): Primary | ICD-10-CM

## 2018-04-23 DIAGNOSIS — M54.42 BILATERAL LOW BACK PAIN WITH LEFT-SIDED SCIATICA, UNSPECIFIED CHRONICITY: ICD-10-CM

## 2018-04-23 LAB
ANION GAP SERPL CALCULATED.3IONS-SCNC: 1 MMOL/L (ref 3–14)
BUN SERPL-MCNC: 24 MG/DL (ref 7–30)
CALCIUM SERPL-MCNC: 9.9 MG/DL (ref 8.5–10.1)
CHLORIDE SERPL-SCNC: 109 MMOL/L (ref 94–109)
CO2 SERPL-SCNC: 30 MMOL/L (ref 20–32)
CREAT SERPL-MCNC: 1.14 MG/DL (ref 0.52–1.04)
ERYTHROCYTE [DISTWIDTH] IN BLOOD BY AUTOMATED COUNT: 13.3 % (ref 10–15)
GFR SERPL CREATININE-BSD FRML MDRD: 45 ML/MIN/1.7M2
GLUCOSE SERPL-MCNC: 99 MG/DL (ref 70–99)
HCT VFR BLD AUTO: 38.2 % (ref 35–47)
HGB BLD-MCNC: 12.6 G/DL (ref 11.7–15.7)
MCH RBC QN AUTO: 32.4 PG (ref 26.5–33)
MCHC RBC AUTO-ENTMCNC: 33 G/DL (ref 31.5–36.5)
MCV RBC AUTO: 98 FL (ref 78–100)
PLATELET # BLD AUTO: 264 10E9/L (ref 150–450)
POTASSIUM SERPL-SCNC: 4.6 MMOL/L (ref 3.4–5.3)
RBC # BLD AUTO: 3.89 10E12/L (ref 3.8–5.2)
SODIUM SERPL-SCNC: 140 MMOL/L (ref 133–144)
WBC # BLD AUTO: 7 10E9/L (ref 4–11)

## 2018-04-23 PROCEDURE — 85027 COMPLETE CBC AUTOMATED: CPT | Performed by: INTERNAL MEDICINE

## 2018-04-23 PROCEDURE — 36415 COLL VENOUS BLD VENIPUNCTURE: CPT | Performed by: INTERNAL MEDICINE

## 2018-04-23 PROCEDURE — 80048 BASIC METABOLIC PNL TOTAL CA: CPT | Performed by: INTERNAL MEDICINE

## 2018-04-23 PROCEDURE — 99214 OFFICE O/P EST MOD 30 MIN: CPT | Performed by: INTERNAL MEDICINE

## 2018-04-23 RX ORDER — METHYLPREDNISOLONE 4 MG
TABLET, DOSE PACK ORAL
Qty: 21 TABLET | Refills: 0 | Status: SHIPPED | OUTPATIENT
Start: 2018-04-23 | End: 2018-07-30

## 2018-04-23 NOTE — LETTER
4/23/2018         Rosie Guerrero  [address]   [place] [address]            Dear MsVerona Yolanda,    I am writing to inform you of the lab tests you had performed recently.      Your lab results are as follows:    Kidney function: STABLE FROM PREVIOUSLY  Hemoglobin: NORMAL  Electrolytes: NORMAL  Glucose: NORMAL    Your lab testing looks stable - please continue all current medications.    Thank you for allowing me to participate in your care.  If you have further questions, please contact us at [phone].      Sincerely,        WILMER Hall MD  Dept. of Internal Medicine  [facility]

## 2018-04-23 NOTE — PATIENT INSTRUCTIONS
- Start taking Medrol dose-payton, as prescribed.  (Prescription has been sent to your pharmacy)    - Continue all other medications as you have been.

## 2018-04-23 NOTE — MR AVS SNAPSHOT
"              After Visit Summary   4/23/2018    Rosie Guerrero    MRN: [id number]           Patient Information     Date Of Birth          5/25/1928        Visit Information        Provider Department      4/23/2018 9:15 AM Drew Hall MD [facility]        Today's Diagnoses     Bilateral low back pain with left-sided sciatica, unspecified chronicity    -  1    Cerebrovascular accident (CVA), unspecified mechanism (H)          Care Instructions    - Start taking Medrol dose-payton, as prescribed.  (Prescription has been sent to your pharmacy)    - Continue all other medications as you have been.            Follow-ups after your visit        Who to contact     If you have questions or need follow up information about today's clinic visit or your schedule please contact [facility] directly at [phone].  Normal or non-critical lab and imaging results will be communicated to you by [employer], letter or phone within 4 business days after the clinic has received the results. If you do not hear from us within 7 days, please contact the clinic through [employer] or phone. If you have a critical or abnormal lab result, we will notify you by phone as soon as possible.  Submit refill requests through [employer] or call your pharmacy and they will forward the refill request to us. Please allow 3 business days for your refill to be completed.          Additional Information About Your Visit        MyChart Information     [employer] lets you send messages to your doctor, view your test results, renew your prescriptions, schedule appointments and more. To sign up, go to [email or web address] . Click on \"Log in\" on the left side of the screen, which will take you to the Welcome page. Then click on \"Sign up Now\" on the right side of the page.     You will be asked to enter the access code listed below, as well as some personal information. Please follow the directions to create your " username and password.     Your access code is: [email or web address]  Expires: 2018  9:38 AM     Your access code will  in 90 days. If you need help or a new code, please call your [place] clinic or [phone].        Care EveryWhere ID     This is your Care EveryWhere ID. This could be used by other organizations to access your [place] medical records  [id number]        Your Vitals Were     Pulse Temperature Respirations Pulse Oximetry          64 97.7  F (36.5  C) (Oral) 16 98%         Blood Pressure from Last 3 Encounters:   18 140/80   17 134/72   17 126/68    Weight from Last 3 Encounters:   17 103 lb 1.6 oz (46.8 kg)   17 107 lb 3.2 oz (48.6 kg)   01/10/17 100 lb 12 oz (45.7 kg)              We Performed the Following     Basic metabolic panel  (Ca, Cl, CO2, Creat, Gluc, K, Na, BUN)     CBC with platelets          Today's Medication Changes          These changes are accurate as of 18  9:38 AM.  If you have any questions, ask your nurse or doctor.               Start taking these medicines.        Dose/Directions    methylPREDNISolone 4 MG tablet   Commonly known as:  MEDROL DOSEPAK   Used for:  Bilateral low back pain with left-sided sciatica, unspecified chronicity   Started by:  Drew Hall MD        Follow package instructions   Quantity:  21 tablet   Refills:  0            Where to get your medicines      These medications were sent to [facility] [address] [place] [address] [address] [facility] [place] [address] [address] [address]    Hours:  24-hours Phone:  [phone]     aspirin 81 MG EC tablet    methylPREDNISolone 4 MG tablet                Primary Care Provider Office Phone # Fax #    Drew Hall [phone]       [address] [place] [address]        Equal Access to Services     SHAYLA DAVILA AH: Hadii aad ku hadasho Soomaali, waaxda luqadaha, qaybta kaalmada adeegyada, juan  balwinder hernandezloulou riggsaan ah. So [facility] [phone].    ATENCIÓN: Si yesica roque, tiene a rodriguez disposición servicios gratuitos de asistencia lingüística. Tiffany pierson [phone].    We comply with applicable federal civil rights laws and [place] laws. We do not discriminate on the basis of race, color, national origin, age, disability, sex, sexual orientation, or gender identity.            Thank you!     Thank you for choosing [facility]  for your care. Our goal is always to provide you with excellent care. Hearing back from our patients is one way we can continue to improve our services. Please take a few minutes to complete the written survey that you may receive in the mail after your visit with us. Thank you!             Your Updated Medication List - Protect others around you: Learn how to safely use, store and throw away your medicines at www.disposemymeds.org.          This list is accurate as of 4/23/18  9:38 AM.  Always use your most recent med list.                   Brand Name Dispense Instructions for use Diagnosis    ascorbic acid 500 MG tablet    VITAMIN C    30 tablet    Take by mouth daily        aspirin 81 MG EC tablet     90 tablet    Take 1 tablet (81 mg) by mouth daily    Cerebrovascular accident (CVA), unspecified mechanism (H)       calcium carbonate 1250 MG tablet    OS-PATRICIA 500 mg [place]. Ca     Take 500 mg by mouth daily        coenzyme Q-10 10 MG Caps     30 capsule         DAILY MULTIPLE VITAMINS Tabs      naturemade mutli + Lutein daily        FISH -310-8765 MG Caps      1 dailoy        ginkgo biloba 60 MG Caps capsule     60 capsule    Take 1 capsule (60 mg) by mouth daily        LUTEIN PO      Take 40 mg by mouth daily        magnesium chloride 535 (64 Mg) MG Tbcr CR tablet      Take 535 mg by mouth daily        methylPREDNISolone 4 MG tablet    MEDROL DOSEPAK    21 tablet    Follow package instructions    Bilateral low back pain with left-sided  sciatica, unspecified chronicity       order for DME     1 Units    Equipment being ordered: Wheeled walker    Cerebrovascular accident (CVA), unspecified mechanism (H)       prune juice Liqd      Take 5 mLs by mouth daily as needed for constipation        vitamin B complex with vitamin C Tabs tablet      Take 1 tablet by mouth daily        vitamin D 400 units capsule     30    1 CAPSULE DAILY        vitamin E 400 UNIT capsule     3 MONTHS    ONE CAPSULE DAILY        Zinc 15 MG Caps      Take 15 mg by mouth daily

## 2018-07-30 ENCOUNTER — OFFICE VISIT (OUTPATIENT)
Dept: INTERNAL MEDICINE | Facility: CLINIC | Age: 83
End: 2018-07-30
Payer: COMMERCIAL

## 2018-07-30 VITALS
TEMPERATURE: 97.5 F | RESPIRATION RATE: 16 BRPM | OXYGEN SATURATION: 98 % | HEART RATE: 61 BPM | HEIGHT: 64 IN | WEIGHT: 96.2 LBS | DIASTOLIC BLOOD PRESSURE: 70 MMHG | SYSTOLIC BLOOD PRESSURE: 120 MMHG | BODY MASS INDEX: 16.43 KG/M2

## 2018-07-30 DIAGNOSIS — H81.399 PERIPHERAL VERTIGO, UNSPECIFIED LATERALITY: Primary | ICD-10-CM

## 2018-07-30 PROCEDURE — 99213 OFFICE O/P EST LOW 20 MIN: CPT | Performed by: INTERNAL MEDICINE

## 2018-07-30 NOTE — PATIENT INSTRUCTIONS
- Start taking meclizine, 25 mg up to three times daily, as needed for dizziness/vertigo.  (Available over-the-counter)

## 2018-07-30 NOTE — PROGRESS NOTES
"  SUBJECTIVE:   Rosie Guerrero is a 89 year old female who presents to clinic today for the following health issues:      Dizziness      Duration: Three months    Description   Feeling faint:  YES  Feeling like the surroundings are moving: YES  Loss of consciousness or falls: no     Intensity:  moderate    Accompanying signs and symptoms:   Nausea/vomitting: no   Palpitations: YES- every three or four weeks more like the bones around the heart hurt  Weakness in arms or legs: YES- no strength in the legs and back hurts  Vision or speech changes: YES  Ringing in ears (Tinnitus): no   Hearing loss related to dizziness: no   Other (fevers/chills/sweating/dyspnea): no     History (similar episodes/head trauma/previous evaluation/recent bleeding): None    Precipitating or alleviating factors (new meds/chemicals): None  Worse with activity/head movement: YES- starts when she wakes up or puts her head down    Therapies tried and outcome: None          Problem list and histories reviewed & adjusted, as indicated.  Additional history: as documented        Reviewed and updated as needed this visit by clinical staff  Tobacco  Allergies  Meds  Problems       Reviewed and updated as needed this visit by Provider  Allergies  Meds  Problems         ROS:  Constitutional, HEENT, cardiovascular, pulmonary, gi and gu systems are negative, except as otherwise noted.    OBJECTIVE:     /70  Pulse 61  Temp 97.5  F (36.4  C) (Oral)  Resp 16  Ht 5' 4\" (1.626 m)  Wt 96 lb 3.2 oz (43.6 kg)  SpO2 98%  BMI 16.51 kg/m2  Body mass index is 16.51 kg/(m^2).  GENERAL: healthy, alert and no distress  HEENT: Cerumen impaction right canal, canal and TM appear normal after removal with loop curette  NECK: no adenopathy, no asymmetry, masses, or scars and thyroid normal to palpation  RESP: lungs clear to auscultation - no rales, rhonchi or wheezes  CV: regular rate and rhythm, normal S1 S2, no S3 or S4, no murmur, click or rub, no " peripheral edema and peripheral pulses strong  ABDOMEN: soft, nontender, no hepatosplenomegaly, no masses and bowel sounds normal  MS: no gross musculoskeletal defects noted, no edema        ASSESSMENT/PLAN:     1. Peripheral vertigo, unspecified laterality  Discussed conservative therapy including OTC meclizine up to TID prn.      F/U as needed    Drew Hall MD  [facility]

## 2018-07-30 NOTE — MR AVS SNAPSHOT
"              After Visit Summary   7/30/2018    Rosie Guerrero    MRN: [id number]           Patient Information     Date Of Birth          5/25/1928        Visit Information        Provider Department      7/30/2018 9:30 AM Drew Hall MD [facility]        Today's Diagnoses     Peripheral vertigo, unspecified laterality    -  1      Care Instructions    - Start taking meclizine, 25 mg up to three times daily, as needed for dizziness/vertigo.  (Available over-the-counter)           Follow-ups after your visit        Who to contact     If you have questions or need follow up information about today's clinic visit or your schedule please contact [facility] directly at [phone].  Normal or non-critical lab and imaging results will be communicated to you by MyChart, letter or phone within 4 business days after the clinic has received the results. If you do not hear from us within 7 days, please contact the clinic through MyChart or phone. If you have a critical or abnormal lab result, we will notify you by phone as soon as possible.  Submit refill requests through [employer] or call your pharmacy and they will forward the refill request to us. Please allow 3 business days for your refill to be completed.          Additional Information About Your Visit        Care EveryWhere ID     This is your Care EveryWhere ID. This could be used by other organizations to access your [facility] medical records  [id number]        Your Vitals Were     Pulse Temperature Respirations Height Pulse Oximetry BMI (Body Mass Index)    61 97.5  F (36.4  C) (Oral) 16 5' 4\" (1.626 m) 98% 16.51 kg/m2       Blood Pressure from Last 3 Encounters:   07/30/18 120/70   04/23/18 140/80   08/09/17 134/72    Weight from Last 3 Encounters:   07/30/18 96 lb 3.2 oz (43.6 kg)   08/09/17 103 lb 1.6 oz (46.8 kg)   01/19/17 107 lb 3.2 oz (48.6 kg)              Today, you had the following     No orders " found for display       Primary Care Provider Office Phone # Fax #    Drew Hall [phone] [address] [address]        Equal Access to Services     SHAYLA DAVILA : Hadii carolina ku ovidioo Sobranali, waaxda luqadaha, qaybta kaalmada adeegyada, juan shin laJuliusrosibel landis. So [facility] [phone].    ATENCIÓN: Si habla español, tiene a rodriguez disposición servicios gratuitos de asistencia lingüística. Llame al [phone].    We comply with applicable federal civil rights laws and [place] laws. We do not discriminate on the basis of race, color, national origin, age, disability, sex, sexual orientation, or gender identity.            Thank you!     Thank you for choosing [facility]  for your care. Our goal is always to provide you with excellent care. Hearing back from our patients is one way we can continue to improve our services. Please take a few minutes to complete the written survey that you may receive in the mail after your visit with us. Thank you!             Your Updated Medication List - Protect others around you: Learn how to safely use, store and throw away your medicines at www.disposemymeds.org.          This list is accurate as of 7/30/18 10:06 AM.  Always use your most recent med list.                   Brand Name Dispense Instructions for use Diagnosis    ascorbic acid 500 MG tablet    VITAMIN C    30 tablet    Take by mouth daily        aspirin 81 MG EC tablet     90 tablet    Take 1 tablet (81 mg) by mouth daily    Cerebrovascular accident (CVA), unspecified mechanism (H)       calcium carbonate 500 MG tablet    OS-PTARICIA 500 mg [place]. Ca     Take 500 mg by mouth daily        coenzyme Q-10 10 MG Caps     30 capsule         DAILY MULTIPLE VITAMINS Tabs      naturemade mutli + Lutein daily        FISH -[id number] MG Caps      1 dailoy        ginkgo biloba 60 MG Caps capsule     60 capsule    Take 1 capsule (60 mg) by  mouth daily        LUTEIN PO      Take 40 mg by mouth daily        magnesium chloride 535 (64 Mg) MG Tbcr CR tablet      Take 535 mg by mouth daily        order for DME     1 Units    Equipment being ordered: Wheeled walker    Cerebrovascular accident (CVA), unspecified mechanism (H)       prune juice Liqd      Take 5 mLs by mouth daily as needed for constipation        vitamin B complex with vitamin C Tabs tablet      Take 1 tablet by mouth daily        vitamin D 400 units capsule     30    1 CAPSULE DAILY        vitamin E 400 UNIT capsule     3 MONTHS    ONE CAPSULE DAILY        Zinc 15 MG Caps      Take 15 mg by mouth daily

## 2019-05-22 ENCOUNTER — HOSPITAL ENCOUNTER (EMERGENCY)
Facility: CLINIC | Age: 84
Discharge: HOME | End: 2019-05-22
Attending: PHYSICIAN ASSISTANT | Admitting: PHYSICIAN ASSISTANT
Payer: COMMERCIAL

## 2019-05-22 VITALS
OXYGEN SATURATION: 99 % | TEMPERATURE: 98 F | HEART RATE: 66 BPM | RESPIRATION RATE: 16 BRPM | DIASTOLIC BLOOD PRESSURE: 92 MMHG | SYSTOLIC BLOOD PRESSURE: 182 MMHG

## 2019-05-22 DIAGNOSIS — F32.A DEPRESSION: ICD-10-CM

## 2019-05-22 DIAGNOSIS — R04.0 EPISTAXIS: ICD-10-CM

## 2019-05-22 PROCEDURE — 99282 EMERGENCY DEPT VISIT SF MDM: CPT

## 2019-05-22 ASSESSMENT — ENCOUNTER SYMPTOMS
FEVER: 0
CONSTIPATION: 1
LIGHT-HEADEDNESS: 0
DIZZINESS: 0

## 2019-05-22 NOTE — ED NOTES
Daina, staff at [facility], updated on patient status and discharge via voicemail.  staff notified of pending discharge and transfer back to facility.

## 2019-05-22 NOTE — DISCHARGE INSTRUCTIONS
"*Avoid picking your nose or blowing it hard.  *No new medications. You may use Vaseline to the left nostril at night.   *Follow-up with ENT in the next 1-2 weeks for recurrent nosebleeds.  *Return if you develop recurrence, faint or feel like you will faint or become worse in any way.      Discharge Instructions  Epistaxis    Today you were seen for a nosebleed.   Nosebleeds (the medical term is \"epistaxis\") are very common. Almost every person has had at least one in their lifetime.  Although the amount of blood loss can appear dramatic, nosebleeds rarely cause serious problems.  The most common causes are dry air or nose picking, but they also are common in people who have allergies, high blood pressure or are on blood thinners, such as Coumadin, Aspirin or Plavix. If you or your child gets a nosebleed, the important thing is to know how to take care of it. With the right self-care, most nosebleeds will stop on their own.    Return to the Emergency Department if:  Your nose is bleeding a very large amount of blood.  You get very pale, faint, or tired.  You cannot get the bleeding to stop after following these instructions.  A nosebleed happens after recent nasal surgery or if you have a known nasal tumor.  You have new, serious symptoms, such as chest pain.  You get a nosebleed after an injury, such as being hit in the face, and you are concerned that you could have other injuries (like a broken bone).    Treatment:  Your doctor may tell you to use a decongestant nose spray, like Afrin  (oxymetazoline), in both nostrils in the morning and at night for the next three days. Do not use this medicine for more than three days at a time.  If you do it will cause nasal congestion.   You should apply a small amount of Vaseline  to the inside of your nostrils for moisture before bed.  Using a humidifier in your bedroom will help as well.  For the next three days, do not blow your nose or put anything in your nose. You may " sniffle, or dab the outside of your nose.  Do not bend with your head below your waist for the next three days. Do not lift anything so heavy that you have to strain.   If you received nasal packing, please do not remove the packing until seen by an Ear Nose and Throat specialist.  If antibiotics have been given with the packing please take as directed.    If your nose starts to bleed again:  Blow your nose to get rid of some of the clots that have formed inside your nostrils. This may increase the bleeding temporarily, but that's OK.  Spray decongestant nose spray (like Afrin ) into both nostrils to constrict the blood vessels.  Sit or stand while bending forward slightly at the waist. Do not lie down or tilt your head back. This may cause you to swallow blood and can lead to vomiting.   the soft part of BOTH nostrils at the bottom of your nose and squeeze your nose closed for at least 5 minutes (for children) or 10 to 15 minutes (for adults). You can use your fingers, or the clip we gave you here. Use a clock to time yourself. Do not release the pressure every so often to check whether the bleeding has stopped. Many people hurt their chances of stopping the bleeding by releasing the pressure too soon or too often.    If you follow the steps outlined above, and your nose continues to bleed, repeat all the steps once more. Apply pressure for a total of at least 30 minutes. If you continue to bleed even then, return to the Emergency Department or go to an urgent care clinic.    If you keep having nose bleeds, schedule an appointment with your regular doctor, or with an Ear, Nose, and Throat Specialist.  If you were given a prescription for medicine here today, be sure to read all of the information (including the package insert) that comes with your prescription.  This will include important information about the medicine, its side effects, and any warnings that you need to know about.  The pharmacist who fills  the prescription can provide more information and answer questions you may have about the medicine.  If you have questions or concerns that the pharmacist cannot address, please call or return to the Emergency Department.   Opioid Medication Information    Pain medications are among the most commonly prescribed medicines, so we are including this information for all our patients. If you did not receive pain medication or get a prescription for pain medicine, you can ignore it.     You may have been given a prescription for an opioid (narcotic) pain medicine and/or have received a pain medicine while here in the Emergency Department. These medicines can make you drowsy or impaired. You must not drive, operate dangerous equipment, or engage in any other dangerous activities while taking these medications. If you drive while taking these medications, you could be arrested for DUI, or driving under the influence. Do not drink any alcohol while you are taking these medications.     Opioid pain medications can cause addiction. If you have a history of chemical dependency of any type, you are at a higher risk of becoming addicted to pain medications.  Only take these prescribed medications to treat your pain when all other options have been tried. Take it for as short a time and as few doses as possible. Store your pain pills in a secure place, as they are frequently stolen and provide a dangerous opportunity for children or visitors in your house to start abusing these powerful medications. We will not replace any lost or stolen medicine.  As soon as your pain is better, you should flush all your remaining medication.     Many prescription pain medications contain Tylenol  (acetaminophen), including Vicodin , Tylenol #3 , Norco , Lortab , and Percocet .  You should not take any extra pills of Tylenol  if you are using these prescription medications or you can get very sick.  Do not ever take more than 3000 mg of  acetaminophen in any 24 hour period.    All opioids tend to cause constipation. Drink plenty of water and eat foods that have a lot of fiber, such as fruits, vegetables, prune juice, apple juice and high fiber cereal.  Take a laxative if you don?t move your bowels at least every other day. Miralax , Milk of Magnesia, Colace , or Senna  can be used to keep you regular.      Remember that you can always come back to the Emergency Department if you are not able to see your regular doctor in the amount of time listed above, if you get any new symptoms, or if there is anything that worries you.        Discharge Instructions  Mental Health Concerns    You  noted that you've been feeling depressed lately. Your provider feels that you do not require hospitalization at this time. However, your symptoms may become worse, and you may need to return to the Emergency Department. Most treatments of depression and suicidal thoughts are a process rather than a single intervention.  Medications and counseling can take several weeks or more to help.    Generally, every Emergency Department visit should have a follow-up clinic visit with either a primary or a specialty clinic/provider. Please follow-up as instructed by your emergency provider today.    By accepting these discharge instructions:  You promise to not harm yourself or others.  You agree that if you feel you are becoming unable to keep that promise, you will do something to help yourself before you do anything to harm yourself or others.   You agree to keep any safety plan arranged on your visit here today.  You agree to take any medication prescribed or recommended by your provider.  If you are getting worse, you can contact a friend or a family member, contact your counselor or family provider, contact a crisis line, or other options discussed with the provider or therapist today.  At any time, you can call 911 and return to the Emergency Department for more help.  You  understand that follow-up is essential to your treatment, and you will make and keep appointments recommended on your visit today.    How to improve your mental health and prevent suicide:  Involve others by letting family, friends, counselors know.  Do not isolate yourself.  Avoid alcohol or drugs. Remove weapons, poisons from your home.  Try to stick to routines for eating, sleeping and getting regular exercise.    Try to get into sunlight. Bright natural light not only treats seasonal affective disorder but also depression.  Increase safe activities that you enjoy.    If you feel worse, contact [phone] ([phone]), or call 911, or your primary provider/counselor for additional assistance.    If you were given a prescription for medicine here today, be sure to read all of the information (including the package insert) that comes with your prescription.  This will include important information about the medicine, its side effects, and any warnings that you need to know about.  The pharmacist who fills the prescription can provide more information and answer questions you may have about the medicine.  If you have questions or concerns that the pharmacist cannot address, please call or return to the Emergency Department.   Remember that you can always come back to the Emergency Department if you are not able to see your regular provider in the amount of time listed above, if you get any new symptoms, or if there is anything that worries you.

## 2019-05-22 NOTE — ED PROVIDER NOTES
"  History     Chief Complaint:  Epistaxis    The history is provided by the patient.      Rosie Guerrero is a 89 year old female who presents via EMS with epistaxis. The patient reports a left-sided nose bleed this morning. She states it started after she was \"pushing her body to have a bowel movement.\" She denies being on blood thinners. The patient says it was bleeding a lot with clots present. She called EMS and prior to EMS arriving the bleeding had stopped. She denies any light-headedness or dizziness now however she states that normally in the morning she has some light-headedness. The patient reports some depression but denies wanting to hurt herself. She reports some constipation.     Allergies:  Lipitor  Penicillin  Sulfa drugs     Medications:    Ascorbic acid (vitamin c) 500 mg tablet  Aspirin 81 mg ec tablet  Calcium carbonate (os-carmenza 500 mg [place]. Ca) 500 mg tablet  Coenzyme q-10 10 mg caps  Ginkgo biloba 60 mg caps  Lutein po  Magnesium chloride 535 (64 mg) mg tbcr cr tablet  Zinc 15 mg caps    Past Medical History:    Generalized osteoarthrosis, unspecified site   Macular degeneration (senile) of retina, unspecified    Other malignant neoplasm of skin of trunk, except scrotum   Rubella without mention of complication   Hyperlipidemia  Cerebral vascular accident  Stroke    Past Surgical History:    Flex sig  Tonsil and adenoidectomy  Hysterectomy    Family History:    Cerebrovascular disease    Social History:  Patient is   Tobacco Use: Former smoker  Alcohol Use: Yes  PCP: Drew Hall     Review of Systems   Constitutional: Negative for fever.   HENT: Positive for nosebleeds.    Gastrointestinal: Positive for constipation.   Neurological: Negative for dizziness and light-headedness.   Psychiatric/Behavioral: Negative for suicidal ideas.        Depression   All other systems reviewed and are negative.    Physical Exam   First Vitals:  Patient Vitals for the past 24 hrs:   BP Temp Temp src " Pulse Resp SpO2   05/22/19 1116 175/81 98  F (36.7  C) Oral 63 16 99 %     Physical Exam  General: Alert, interactive. GCS 15  Head:  Scalp is atraumatic.  Eyes:  EOM intact. The pupils are equal, round, and reactive to light. No scleral icterus.  ENT:                                      Ears:  The external ears are normal.  Nose:  The external nose is normal. Dried blood seen in the left nares. Friable tissue to the left kiesselbach plexus. No active bleeding.  Throat:  The oropharynx is normal. Mucus membranes are moist.                 Neck:  Normal range of motion. There is no rigidity.   CV:  Regular rate and rhythm. No murmur. 2+ radial pulses  Resp:  Breath sounds are clear bilaterally. Non-labored, no retractions or accessory muscle use.  GI:  Abdomen is soft, no distension, no tenderness.   MS:  Normal range of motion.   Skin:  Warm and dry.   Neuro:  Strength and sensation grossly intact.   Psych:  Awake. Alert.  Appropriate interactions.     Emergency Department Course     Emergency Department Course:  11:18 AM Nursing notes and vitals reviewed.  I performed an exam of the patient as documented above.     11:53 AM Findings and plan explained to the patient. Patient discharged home with instructions regarding supportive care, medications, and reasons to return. The importance of close follow-up was reviewed.     Impression & Plan      Medical Decision Making:  Rosie Guerrero is a 89 year old female who presents for evaluation of epistaxis- resolved prior to arrival.  The patient is not on a blood thinner. No further bleeding in the department. Outpatient management indicated. Close follow-up with primary care provider and ENT for recurrent bleeding. There are no signs of coagulopathy causing the bleeding or a general medical condition (thrombocytopenia, DIC, leukemia, etc) causing the bleeding today. Discussed with patient to use vaseline or bacitracin in nares nightly for the next week.     Of note,  patient reported feeling depressed over the last 2 weeks while nurse went through screening questionaire. Denies suicidal ideation. She declined referral to therapy- discharged home with mental health resources.     Diagnosis:    ICD-10-CM    1. Epistaxis R04.0     resolved   2. Depression F32.9        Disposition:  discharged to home    I, Bradley Aasen, am serving as a scribe on 5/22/2019 at 11:18 AM to personally document services performed by Elif Neumann PA-C based on my observations and the provider's statements to me.          Elif Neumann PA-C  05/22/19 9066

## 2019-05-22 NOTE — ED TRIAGE NOTES
Nosebleed for approx 15 minutes this morning after having BM. Was stopped by the time EMS arrived. Denies blood thinners.

## 2019-05-22 NOTE — ED NOTES
Bed: ED11  Expected date:   Expected time:   Means of arrival:   Comments:  lina - 512 - 89F nosebleed dizzy eta 1110

## 2019-05-22 NOTE — ED AVS SNAPSHOT
Emergency Department  [address] [place] [address]  Phone:  [phone]  Fax:  [phone]                                    Rosie Guerrero   MRN: [id number]    Department:   Emergency Department   Date of Visit:  5/22/2019           After Visit Summary Signature Page    I have received my discharge instructions, and my questions have been answered. I have discussed any challenges I see with this plan with the nurse or doctor.    ..........................................................................................................................................  Patient/Patient Representative Signature      ..........................................................................................................................................  Patient Representative Print Name and Relationship to Patient    ..................................................               ................................................  Date                                   Time    ..........................................................................................................................................  Reviewed by Signature/Title    ...................................................              ..............................................  Date                                               Time          22EPIC Rev 08/18

## 2019-05-30 ENCOUNTER — OFFICE VISIT (OUTPATIENT)
Dept: INTERNAL MEDICINE | Facility: CLINIC | Age: 84
End: 2019-05-30
Payer: COMMERCIAL

## 2019-05-30 VITALS
HEIGHT: 64 IN | OXYGEN SATURATION: 96 % | TEMPERATURE: 97.3 F | HEART RATE: 75 BPM | DIASTOLIC BLOOD PRESSURE: 60 MMHG | WEIGHT: 93.9 LBS | RESPIRATION RATE: 16 BRPM | SYSTOLIC BLOOD PRESSURE: 135 MMHG | BODY MASS INDEX: 16.03 KG/M2

## 2019-05-30 DIAGNOSIS — R04.0 EPISTAXIS: Primary | ICD-10-CM

## 2019-05-30 DIAGNOSIS — H81.399 PERIPHERAL VERTIGO, UNSPECIFIED LATERALITY: ICD-10-CM

## 2019-05-30 PROCEDURE — 99214 OFFICE O/P EST MOD 30 MIN: CPT | Performed by: INTERNAL MEDICINE

## 2019-05-30 ASSESSMENT — PATIENT HEALTH QUESTIONNAIRE - PHQ9: SUM OF ALL RESPONSES TO PHQ QUESTIONS 1-9: 2

## 2019-05-30 ASSESSMENT — MIFFLIN-ST. JEOR: SCORE: 825.93

## 2019-05-30 NOTE — PATIENT INSTRUCTIONS
"- If you want, you could try an over-the-counter medication called \"meclizine,\" up to three times daily as needed for dizziness.   These tablets are each 25 mg, and can be cut in half if you want.  "

## 2019-05-30 NOTE — LETTER
My Depression Action Plan  Name: Rosie Guerrero   Date of Birth 5/25/1928  Date: 5/30/2019    My doctor: Drew Hall   My clinic: [address] [address] [phone]          GREEN    ZONE   Good Control    What it looks like:     Things are going generally well. You have normal up s and down s. You may even feel depressed from time to time, but bad moods usually last less than a day.   What you need to do:  1. Continue to care for yourself (see self care plan)  2. Check your depression survival kit and update it as needed  3. Follow your physician s recommendations including any medication.  4. Do not stop taking medication unless you consult with your physician first.           YELLOW         ZONE Getting Worse    What it looks like:     Depression is starting to interfere with your life.     It may be hard to get out of bed; you may be starting to isolate yourself from others.    Symptoms of depression are starting to last most all day and this has happened for several days.     You may have suicidal thoughts but they are not constant.   What you need to do:     1. Call your care team, your response to treatment will improve if you keep your care team informed of your progress. Yellow periods are signs an adjustment may need to be made.     2. Continue your self-care, even if you have to fake it!    3. Talk to someone in your support network    4. Open up your depression survival kit           RED    ZONE Medical Alert - Get Help    What it looks like:     Depression is seriously interfering with your life.     You may experience these or other symptoms: You can t get out of bed most days, can t work or engage in other necessary activities, you have trouble taking care of basic hygiene, or basic responsibilities, thoughts of suicide or death that will not go away, self-injurious behavior.     What you need to do:  1. Call your care  team and request a same-day appointment. If they are not available (weekends or after hours) call your local crisis line, emergency room or 911.            Depression Self Care Plan / Survival Kit    Self-Care for Depression  Here s the deal. Your body and mind are really not as separate as most people think.  What you do and think affects how you feel and how you feel influences what you do and think. This means if you do things that people who feel good do, it will help you feel better.  Sometimes this is all it takes.  There is also a place for medication and therapy depending on how severe your depression is, so be sure to consult with your medical provider and/ or Behavioral Health Consultant if your symptoms are worsening or not improving.     In order to better manage my stress, I will:    Exercise  Get some form of exercise, every day. This will help reduce pain and release endorphins, the  feel good  chemicals in your brain. This is almost as good as taking antidepressants!  This is not the same as joining a gym and then never going! (they count on that by the way ) It can be as simple as just going for a walk or doing some gardening, anything that will get you moving.      Hygiene   Maintain good hygiene (Get out of bed in the morning, Make your bed, Brush your teeth, Take a shower, and Get dressed like you were going to work, even if you are unemployed).  If your clothes don't fit try to get ones that do.    Diet  I will strive to eat foods that are good for me, drink plenty of water, and avoid excessive sugar, caffeine, alcohol, and other mood-altering substances.  Some foods that are helpful in depression are: complex carbohydrates, B vitamins, flaxseed, fish or fish oil, fresh fruits and vegetables.    Psychotherapy  I agree to participate in Individual Therapy (if recommended).    Medication  If prescribed medications, I agree to take them.  Missing doses can result in serious side effects.  I  understand that drinking alcohol, or other illicit drug use, may cause potential side effects.  I will not stop my medication abruptly without first discussing it with my provider.    Staying Connected With Others  I will stay in touch with my friends, family members, and my primary care provider/team.    Use your imagination  Be creative.  We all have a creative side; it doesn t matter if it s oil painting, sand castles, or mud pies! This will also kick up the endorphins.    Witness Beauty  (AKA stop and smell the roses) Take a look outside, even in mid-winter. Notice colors, textures. Watch the squirrels and birds.     Service to others  Be of service to others.  There is always someone else in need.  By helping others we can  get out of ourselves  and remember the really important things.  This also provides opportunities for practicing all the other parts of the program.    Humor  Laugh and be silly!  Adjust your TV habits for less news and crime-drama and more comedy.    Control your stress  Try breathing deep, massage therapy, biofeedback, and meditation. Find time to relax each day.     My support system    Clinic Contact:  Phone number:    Contact 1:  Phone number:    Contact 2:  Phone number:    [place]/:  Phone number:    Therapist:  Phone number:    Local crisis center:    Phone number:    Other community support:  Phone number:

## 2019-05-30 NOTE — PROGRESS NOTES
"Subjective     Rosie Guerrero is a 89 year old female who presents to clinic today for the following health issues:    HPI   ED/UC Followup:    Facility:  [facility]  Date of visit: 05/22/2019  Reason for visit: Epistaxis, depression  Current Status: Dizzy, and left ear is acting up not hearing as good. A little depressed not going quite like she wants. She wants to move from [facility] and get someone to help her with house work           ER documentation reviewed.  She has had no further epistaxis since her ER visit.  Overall feels reasonably well, is complaining of some \"dizziness,\" described as a sensation that the floor is moving with sudden head position changes.  Has had peripheral vertigo in the past.      Reviewed and updated as needed this visit by Provider  Tobacco  Allergies  Meds  Problems  Med Hx  Surg Hx  Fam Hx         Review of Systems   ROS COMP: Constitutional, HEENT, cardiovascular, pulmonary, GI, , musculoskeletal, neuro, skin, endocrine and psych systems are negative, except as otherwise noted.      Objective    /60   Pulse 75   Temp 97.3  F (36.3  C) (Oral)   Resp 16   Ht 1.626 m (5' 4\")   Wt 42.6 kg (93 lb 14.4 oz)   SpO2 96%   BMI 16.12 kg/m    Body mass index is 16.12 kg/m .  Physical Exam   GENERAL: healthy, alert and no distress  NECK: no adenopathy, no asymmetry, masses, or scars and thyroid normal to palpation  RESP: lungs clear to auscultation - no rales, rhonchi or wheezes  CV: regular rate and rhythm, normal S1 S2, no S3 or S4, no murmur, click or rub, no peripheral edema and peripheral pulses strong  ABDOMEN: soft, nontender, no hepatosplenomegaly, no masses and bowel sounds normal  MS: no gross musculoskeletal defects noted, no edema            Assessment & Plan     1. Epistaxis  Resolved    2. Peripheral vertigo, unspecified laterality  Mild, patient already using a wheeled walker successfully.  Recommended low-dose meclizine, up to 3 times daily as " needed for severe vertiginous symptoms.             Return in about 1 month (around 6/27/2019) for recheck, if symptoms fail to improve.    Drew Hall MD  [facility]

## 2020-01-01 ENCOUNTER — NURSING HOME VISIT (OUTPATIENT)
Dept: GERIATRICS | Facility: CLINIC | Age: 85
End: 2020-01-01
Payer: COMMERCIAL

## 2020-01-01 ENCOUNTER — MEDICAL CORRESPONDENCE (OUTPATIENT)
Dept: HEALTH INFORMATION MANAGEMENT | Facility: CLINIC | Age: 85
End: 2020-01-01

## 2020-01-01 ENCOUNTER — APPOINTMENT (OUTPATIENT)
Dept: GENERAL RADIOLOGY | Facility: CLINIC | Age: 85
End: 2020-01-01
Attending: EMERGENCY MEDICINE
Payer: COMMERCIAL

## 2020-01-01 ENCOUNTER — APPOINTMENT (OUTPATIENT)
Dept: CT IMAGING | Facility: CLINIC | Age: 85
End: 2020-01-01
Attending: EMERGENCY MEDICINE
Payer: COMMERCIAL

## 2020-01-01 ENCOUNTER — TELEPHONE (OUTPATIENT)
Dept: INTERNAL MEDICINE | Facility: CLINIC | Age: 85
End: 2020-01-01

## 2020-01-01 ENCOUNTER — HOSPITAL ENCOUNTER (EMERGENCY)
Facility: CLINIC | Age: 85
Discharge: HOME | End: 2020-11-02
Attending: EMERGENCY MEDICINE | Admitting: EMERGENCY MEDICINE
Payer: COMMERCIAL

## 2020-01-01 ENCOUNTER — TELEPHONE (OUTPATIENT)
Dept: GERIATRICS | Facility: CLINIC | Age: 85
End: 2020-01-01

## 2020-01-01 VITALS
HEART RATE: 116 BPM | TEMPERATURE: 95 F | RESPIRATION RATE: 18 BRPM | OXYGEN SATURATION: 97 % | SYSTOLIC BLOOD PRESSURE: 143 MMHG | DIASTOLIC BLOOD PRESSURE: 89 MMHG

## 2020-01-01 VITALS
SYSTOLIC BLOOD PRESSURE: 153 MMHG | TEMPERATURE: 97.7 F | HEART RATE: 68 BPM | DIASTOLIC BLOOD PRESSURE: 80 MMHG | OXYGEN SATURATION: 98 %

## 2020-01-01 VITALS
OXYGEN SATURATION: 97 % | RESPIRATION RATE: 18 BRPM | TEMPERATURE: 97.8 F | HEIGHT: 68 IN | BODY MASS INDEX: 13.4 KG/M2 | SYSTOLIC BLOOD PRESSURE: 166 MMHG | WEIGHT: 88.4 LBS | HEART RATE: 65 BPM | DIASTOLIC BLOOD PRESSURE: 70 MMHG

## 2020-01-01 DIAGNOSIS — E78.5 HYPERLIPIDEMIA, UNSPECIFIED HYPERLIPIDEMIA TYPE: ICD-10-CM

## 2020-01-01 DIAGNOSIS — N18.31 STAGE 3A CHRONIC KIDNEY DISEASE (H): ICD-10-CM

## 2020-01-01 DIAGNOSIS — Z86.73 HISTORY OF CVA (CEREBROVASCULAR ACCIDENT): ICD-10-CM

## 2020-01-01 DIAGNOSIS — I67.9 CEREBRAL VASCULAR DISEASE: Primary | ICD-10-CM

## 2020-01-01 DIAGNOSIS — F03.90 DEMENTIA WITHOUT BEHAVIORAL DISTURBANCE, UNSPECIFIED DEMENTIA TYPE: ICD-10-CM

## 2020-01-01 DIAGNOSIS — M54.9 ACUTE BACK PAIN, UNSPECIFIED BACK LOCATION, UNSPECIFIED BACK PAIN LATERALITY: Primary | ICD-10-CM

## 2020-01-01 DIAGNOSIS — R63.4 WEIGHT LOSS: ICD-10-CM

## 2020-01-01 DIAGNOSIS — H35.30 MACULAR DEGENERATION, UNSPECIFIED LATERALITY, UNSPECIFIED TYPE: ICD-10-CM

## 2020-01-01 DIAGNOSIS — S70.01XA CONTUSION OF RIGHT HIP AND THIGH, INITIAL ENCOUNTER: ICD-10-CM

## 2020-01-01 DIAGNOSIS — M19.90 ARTHRITIS: ICD-10-CM

## 2020-01-01 DIAGNOSIS — S70.11XA CONTUSION OF RIGHT HIP AND THIGH, INITIAL ENCOUNTER: ICD-10-CM

## 2020-01-01 DIAGNOSIS — Z00.00 ANNUAL PHYSICAL EXAM: ICD-10-CM
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
## 2020-01-01 DIAGNOSIS — E83.42 HYPOMAGNESEMIA: Primary | ICD-10-CM

## 2020-01-01 DIAGNOSIS — Z91.81 PERSONAL HISTORY OF FALL: Primary | ICD-10-CM

## 2020-01-01 DIAGNOSIS — K59.00 CONSTIPATION, UNSPECIFIED CONSTIPATION TYPE: Primary | ICD-10-CM

## 2020-01-01 LAB
ALBUMIN SERPL-MCNC: 3.1 G/DL (ref 3.4–5)
ALBUMIN UR-MCNC: NEGATIVE MG/DL
ALP SERPL-CCNC: 78 U/L (ref 40–150)
ALT SERPL W P-5'-P-CCNC: 22 U/L (ref 0–50)
ANION GAP SERPL CALCULATED.3IONS-SCNC: 3 MMOL/L (ref 3–14)
APPEARANCE UR: CLEAR
AST SERPL W P-5'-P-CCNC: 24 U/L (ref 0–45)
BACTERIA SPEC CULT: NO GROWTH
BASOPHILS # BLD AUTO: 0 10E9/L (ref 0–0.2)
BASOPHILS NFR BLD AUTO: 0.3 %
BILIRUB SERPL-MCNC: 0.6 MG/DL (ref 0.2–1.3)
BILIRUB UR QL STRIP: NEGATIVE
BUN SERPL-MCNC: 25 MG/DL (ref 7–30)
CALCIUM SERPL-MCNC: 9.7 MG/DL (ref 8.5–10.1)
CHLORIDE SERPL-SCNC: 110 MMOL/L (ref 94–109)
CK SERPL-CCNC: 215 U/L (ref 30–225)
CO2 SERPL-SCNC: 29 MMOL/L (ref 20–32)
COLOR UR AUTO: YELLOW
CREAT SERPL-MCNC: 0.96 MG/DL (ref 0.52–1.04)
DIFFERENTIAL METHOD BLD: ABNORMAL
EOSINOPHIL # BLD AUTO: 0 10E9/L (ref 0–0.7)
EOSINOPHIL NFR BLD AUTO: 0.2 %
ERYTHROCYTE [DISTWIDTH] IN BLOOD BY AUTOMATED COUNT: 13.9 % (ref 10–15)
GFR SERPL CREATININE-BSD FRML MDRD: 51 ML/MIN/{1.73_M2}
GLUCOSE SERPL-MCNC: 95 MG/DL (ref 70–99)
GLUCOSE UR STRIP-MCNC: NEGATIVE MG/DL
HCT VFR BLD AUTO: 38 % (ref 35–47)
HGB BLD-MCNC: 12.5 G/DL (ref 11.7–15.7)
HGB UR QL STRIP: NEGATIVE
IMM GRANULOCYTES # BLD: 0 10E9/L (ref 0–0.4)
IMM GRANULOCYTES NFR BLD: 0.3 %
KETONES UR STRIP-MCNC: 5 MG/DL
LABORATORY COMMENT REPORT: NORMAL
LACTATE BLD-SCNC: 1 MMOL/L (ref 0.7–2)
LEUKOCYTE ESTERASE UR QL STRIP: NEGATIVE
LYMPHOCYTES # BLD AUTO: 0.8 10E9/L (ref 0.8–5.3)
LYMPHOCYTES NFR BLD AUTO: 6.6 %
MCH RBC QN AUTO: 31.2 PG (ref 26.5–33)
MCHC RBC AUTO-ENTMCNC: 32.9 G/DL (ref 31.5–36.5)
MCV RBC AUTO: 95 FL (ref 78–100)
MONOCYTES # BLD AUTO: 0.5 10E9/L (ref 0–1.3)
MONOCYTES NFR BLD AUTO: 4.7 %
MUCOUS THREADS #/AREA URNS LPF: PRESENT /LPF
NEUTROPHILS # BLD AUTO: 10.2 10E9/L (ref 1.6–8.3)
NEUTROPHILS NFR BLD AUTO: 87.9 %
NITRATE UR QL: NEGATIVE
NRBC # BLD AUTO: 0 10*3/UL
NRBC BLD AUTO-RTO: 0 /100
PH UR STRIP: 7 PH (ref 5–7)
PLATELET # BLD AUTO: 263 10E9/L (ref 150–450)
POTASSIUM SERPL-SCNC: 4.6 MMOL/L (ref 3.4–5.3)
PROT SERPL-MCNC: 6.6 G/DL (ref 6.8–8.8)
RBC # BLD AUTO: 4.01 10E12/L (ref 3.8–5.2)
RBC #/AREA URNS AUTO: <1 /HPF (ref 0–2)
SARS-COV-2 RNA SPEC QL NAA+PROBE: NEGATIVE
SARS-COV-2 RNA SPEC QL NAA+PROBE: NORMAL
SODIUM SERPL-SCNC: 142 MMOL/L (ref 133–144)
SOURCE: ABNORMAL
SP GR UR STRIP: 1.01 (ref 1–1.03)
SPECIMEN SOURCE: NORMAL
SQUAMOUS #/AREA URNS AUTO: <1 /HPF (ref 0–1)
TROPONIN I SERPL-MCNC: 0.02 UG/L (ref 0–0.04)
UROBILINOGEN UR STRIP-MCNC: NORMAL MG/DL (ref 0–2)
WBC # BLD AUTO: 11.6 10E9/L (ref 4–11)
WBC #/AREA URNS AUTO: 0 /HPF (ref 0–5)

## 2020-01-01 PROCEDURE — 82550 ASSAY OF CK (CPK): CPT | Performed by: EMERGENCY MEDICINE

## 2020-01-01 PROCEDURE — 80053 COMPREHEN METABOLIC PANEL: CPT | Performed by: EMERGENCY MEDICINE

## 2020-01-01 PROCEDURE — C9803 HOPD COVID-19 SPEC COLLECT: HCPCS

## 2020-01-01 PROCEDURE — 99309 SBSQ NF CARE MODERATE MDM 30: CPT | Performed by: NURSE PRACTITIONER

## 2020-01-01 PROCEDURE — 81001 URINALYSIS AUTO W/SCOPE: CPT | Performed by: EMERGENCY MEDICINE

## 2020-01-01 PROCEDURE — 87040 BLOOD CULTURE FOR BACTERIA: CPT | Performed by: EMERGENCY MEDICINE

## 2020-01-01 PROCEDURE — 72125 CT NECK SPINE W/O DYE: CPT

## 2020-01-01 PROCEDURE — 71045 X-RAY EXAM CHEST 1 VIEW: CPT

## 2020-01-01 PROCEDURE — 99305 1ST NF CARE MODERATE MDM 35: CPT | Performed by: INTERNAL MEDICINE

## 2020-01-01 PROCEDURE — 99285 EMERGENCY DEPT VISIT HI MDM: CPT | Mod: 25

## 2020-01-01 PROCEDURE — 99283 EMERGENCY DEPT VISIT LOW MDM: CPT | Mod: 27

## 2020-01-01 PROCEDURE — U0003 INFECTIOUS AGENT DETECTION BY NUCLEIC ACID (DNA OR RNA); SEVERE ACUTE RESPIRATORY SYNDROME CORONAVIRUS 2 (SARS-COV-2) (CORONAVIRUS DISEASE [COVID-19]), AMPLIFIED PROBE TECHNIQUE, MAKING USE OF HIGH THROUGHPUT TECHNOLOGIES AS DESCRIBED BY CMS-2020-01-R: HCPCS | Performed by: EMERGENCY MEDICINE

## 2020-01-01 PROCEDURE — 99207 PR CDG-CODE INCORRECT PER BILLING BASED ON TIME: CPT | Performed by: NURSE PRACTITIONER

## 2020-01-01 PROCEDURE — 73502 X-RAY EXAM HIP UNI 2-3 VIEWS: CPT

## 2020-01-01 PROCEDURE — 85025 COMPLETE CBC W/AUTO DIFF WBC: CPT | Performed by: EMERGENCY MEDICINE

## 2020-01-01 PROCEDURE — 83605 ASSAY OF LACTIC ACID: CPT | Performed by: EMERGENCY MEDICINE

## 2020-01-01 PROCEDURE — 70450 CT HEAD/BRAIN W/O DYE: CPT

## 2020-01-01 PROCEDURE — 84484 ASSAY OF TROPONIN QUANT: CPT | Performed by: EMERGENCY MEDICINE

## 2020-01-01 RX ORDER — TRAMADOL HYDROCHLORIDE 50 MG/1
25-50 TABLET ORAL EVERY 6 HOURS PRN
Qty: 15 TABLET | Refills: 0 | Status: SHIPPED | OUTPATIENT
Start: 2020-01-01 | End: 2020-01-01 | Stop reason: DRUGHIGH

## 2020-01-01 RX ORDER — POLYETHYLENE GLYCOL 3350 17 G/17G
1 POWDER, FOR SOLUTION ORAL DAILY PRN
Qty: 30 PACKET | Refills: 3 | Status: ON HOLD
Start: 2020-01-01 | End: 2021-01-01

## 2020-01-01 RX ORDER — ACETAMINOPHEN 325 MG/1
650 TABLET ORAL EVERY 6 HOURS PRN
Status: ON HOLD | COMMUNITY
End: 2021-01-01

## 2020-01-01 RX ORDER — VITAMIN E 268 MG
800 CAPSULE ORAL DAILY
COMMUNITY
End: 2021-01-01

## 2020-01-01 RX ORDER — MULTIPLE VITAMINS W/ MINERALS TAB 9MG-400MCG
1 TAB ORAL DAILY
Status: ON HOLD | COMMUNITY
End: 2021-01-01

## 2020-01-01 RX ORDER — TRAMADOL HYDROCHLORIDE 50 MG/1
25-50 TABLET ORAL EVERY 6 HOURS PRN
Qty: 15 TABLET | Refills: 0 | Status: SHIPPED | OUTPATIENT
Start: 2020-01-01 | End: 2020-01-01

## 2020-01-01 RX ORDER — SODIUM CHLORIDE 9 MG/ML
INJECTION, SOLUTION INTRAVENOUS CONTINUOUS
Status: DISCONTINUED | OUTPATIENT
Start: 2020-01-01 | End: 2020-01-01 | Stop reason: HOSPADM

## 2020-01-01 RX ORDER — TRAMADOL HYDROCHLORIDE 50 MG/1
25-50 TABLET ORAL EVERY 6 HOURS PRN
COMMUNITY
End: 2021-01-01

## 2020-01-01 ASSESSMENT — MIFFLIN-ST. JEOR: SCORE: 859.48

## 2020-01-01 ASSESSMENT — ENCOUNTER SYMPTOMS: COUGH: 1

## 2020-04-28 ENCOUNTER — APPOINTMENT (OUTPATIENT)
Dept: GENERAL RADIOLOGY | Facility: CLINIC | Age: 85
End: 2020-04-28
Attending: EMERGENCY MEDICINE
Payer: COMMERCIAL

## 2020-04-28 ENCOUNTER — HOSPITAL ENCOUNTER (EMERGENCY)
Facility: CLINIC | Age: 85
Discharge: HOME | End: 2020-04-28
Attending: EMERGENCY MEDICINE | Admitting: EMERGENCY MEDICINE
Payer: COMMERCIAL

## 2020-04-28 VITALS
RESPIRATION RATE: 16 BRPM | HEIGHT: 68 IN | OXYGEN SATURATION: 100 % | WEIGHT: 94 LBS | SYSTOLIC BLOOD PRESSURE: 164 MMHG | DIASTOLIC BLOOD PRESSURE: 67 MMHG | TEMPERATURE: 97.7 F | BODY MASS INDEX: 14.25 KG/M2
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
## 2020-04-28 PROCEDURE — 72170 X-RAY EXAM OF PELVIS: CPT

## 2020-04-28 PROCEDURE — 99284 EMERGENCY DEPT VISIT MOD MDM: CPT

## 2020-04-28 PROCEDURE — 73560 X-RAY EXAM OF KNEE 1 OR 2: CPT | Mod: 50

## 2020-04-28 ASSESSMENT — MIFFLIN-ST. JEOR: SCORE: 889.88

## 2020-04-28 NOTE — ED NOTES
Patient states is moving from one apartment to the next. Patient states slid off the chair, felt more weak than she typically does. Patient states because of the move has not gotten a lot of sleep in last few days.

## 2020-04-28 NOTE — ED NOTES
Bed: ED26  Expected date:   Expected time:   Means of arrival:   Comments:  Baljeet 515: 89F fall; leg pain; chronic dizziness.

## 2020-04-28 NOTE — ED TRIAGE NOTES
Patient brought in by EMS from home. Patient reports was transferring from chair on her walker to regular chair. Patient fell down mid transfer. Complains of pain behind both knees. Left is worse than the right.

## 2020-04-28 NOTE — ED AVS SNAPSHOT
Emergency Department  [address] [place] [address]  Phone:  [phone]  Fax:  [phone]                                    Rosie Guerrero   MRN: [id number]    Department:   Emergency Department   Date of Visit:  4/28/2020           After Visit Summary Signature Page    I have received my discharge instructions, and my questions have been answered. I have discussed any challenges I see with this plan with the nurse or doctor.    ..........................................................................................................................................  Patient/Patient Representative Signature      ..........................................................................................................................................  Patient Representative Print Name and Relationship to Patient    ..................................................               ................................................  Date                                   Time    ..........................................................................................................................................  Reviewed by Signature/Title    ...................................................              ..............................................  Date                                               Time          22EPIC Rev 08/18

## 2020-04-28 NOTE — DISCHARGE INSTRUCTIONS
-Take acetaminophen 500 to 1000 mg by mouth every 4 to 6 hours as needed for pain or fever.  Do not take more than 4000 mg in 24 hours.  Do not take within 6 hours of another acetaminophen containing medication such as norco (vicodin) or percocet.    Please return to the emergency department as needed for new or worsening symptoms such as chest pain, shortness of breath, abdominal pain, severe uncontrollable extremity or pelvic pain, any other concerning symptoms.

## 2020-04-28 NOTE — ED PROVIDER NOTES
"  History     Chief Complaint:  Fall and Leg Pain    HPI   Rosie Guerrero is a 89 year old female who presents with bilat knee pain s/p mechanical fall.  Pt reports that she was moving apartments today and when transferring chairs, fell to the ground.  Pt reports nonradiating mild to mod bilat knee pain, worse with mov't, onset earlier today.  Pt denies abd pain, chest pain, shortness of breath, changes in urination or BM, headache.    Allergies:  Lipitor   Penicillin  Sulfa Drugs      Medications:    Aspirin   Os-carmenza   Coenzyme Q  Ginkgo Biloba   Lutein   Magnesium Chloride   ascorbic acid    Past Medical History:    Stroke  Osteoarthritis   Macular degeneration   Hyperlipidemia   Malignant neoplasm of skin trunk  Rubella     Past Surgical History:    Flex sig  Colonoscopy   Tonsillectomy and adenoidectomy  Hysterectomy     Family History:    Mother: Cerebrovascular Disease, dementia  Father: Cerebrovascular Disease     Social History:  The patient was accompanied to the ED by EMS.  Smoking Status: Former Smoker  Smokeless Tobacco: Never Used  Alcohol Use: Positive  Drug Use: Negative  PCP: Drew Hall   Marital Status:        Review of Systems  Full ROS completed and negative other than pertinent positives and negatives noted in HPI.    Physical Exam     Patient Vitals for the past 24 hrs:   BP Temp Temp src Heart Rate Resp SpO2 Height Weight   04/28/20 1230 (!) 164/67 97.7  F (36.5  C) Oral 64 16 100 % 1.727 m (5' 8\") 42.6 kg (94 lb)      Physical Exam     Vital signs reviewed.  Nursing note reviewed.  Constitutional: Well-developed, Well-nourished.  Non-diaphoretic.  Mild distress    HENT: No evidence of head trauma.  No pain or instability to palpation of bony orbits, mandible, maxilla.  Good jaw opening.  No malocclusion.  No nasal septal hematoma.  OP clear and moist.    EYES:  PERRL.  EOMI.  NECK:  No Cspine tenderness.  Trachea midline.  Full ROM.  Supple. No stridor.  CARDIAC:  RRR.   CHEST:  " No external evidence of chest trauma  PULM: Effort  Normal.  Breath sounds clear and equal bilat  ABD:  Soft, NT/ND, normal BS.  No guarding, no rebound.    : No CVA T.    BACK:  No T or L spinous process tenderness.  Pelvis stable.  EXT:  Full ROM X4.  No tenderness, edema, crepitus or obvious deformity other than that noted below   Mild bilat knee pain and tenderness, no obvious deformity or laceration, full ROM, no erythema or effusion  NEURO:  Alert, Oriented X3.  5/5 UE and LE, proximal and distal.  Sensation intact to LT.   SKIN :  Warm.  Dry.   No erythema.  No rash  PSYCH.:  Normal judgment.  Normal affect.      Emergency Department Course     Imaging:  Radiology findings were communicated with the patient who voiced understanding of the findings.    XR Knee Bilateral 1/2 Views  Bilateral meniscal chondrocalcinosis. Suspected bilateral  patellofemoral compartment degenerative for arthrosis. No apparent  fracture  RACHANA SNOW MD  Reading per radiology    XR Pelvis 1/2 Views  No apparent pelvic fracture. Degenerative changes are  noted in the lower lumbar spine.  RACHANA SNOW MD  Reading per radiology      Emergency Department Course:     Nursing notes and vitals reviewed. I performed an exam of the patient as documented above.     1318 The patient was sent for a XR while in the emergency department, results above.      1415 Prior to discharge, I personally reviewed the results with the patient and all related questions were answered. The patient verbalized understanding and is amenable to plan.     Impression & Plan      Medical Decision Making:  Rosie Guerrero is a 89 year old female presenting w/ bilat knee pain s/p mechanical fall.    DDx includes mechanical fall, fracture, contusion,.  Doubt  intracranial hemorrhage, skull fracture, seizure, syncope, CVA given history and physical exam.  No other evidence of trauma on full primary and secondary trauma surveys other than areas imaged above or  documented in physical exam.  Given mechanical fall and no other complaints, EKG and blood work deferred. Imaging sig for no acute osseus abnml. Pt declined pain medication.  At this time I feel the pt is safe for discharge.  Recommendations given regarding follow up with PCP and return to the emergency department as needed for new or worsening symptoms.  Pt counseled on all results, disposition and diagnosis.  They are understanding and agreeable to plan. Patient discharged in stable condition.     Diagnosis:    ICD-10-CM    1. Fall, initial encounter  W19.XXXA      Disposition:   The patient is discharged to home.     Discharge Medications:  No discharge medications.    Scribe Disclosure:  I, Orla Severson, am serving as a scribe at 12:31 PM on 4/28/2020 to document services personally performed by Jean Salazar MD based on my observations and the provider's statements to me.    EMERGENCY DEPARTMENT       Jean Salazar MD  04/29/20 0867

## 2020-05-08 ENCOUNTER — OFFICE VISIT (OUTPATIENT)
Dept: INTERNAL MEDICINE | Facility: CLINIC | Age: 85
End: 2020-05-08
Payer: COMMERCIAL

## 2020-05-08 VITALS
WEIGHT: 89.6 LBS | HEART RATE: 74 BPM | BODY MASS INDEX: 13.62 KG/M2 | SYSTOLIC BLOOD PRESSURE: 100 MMHG | OXYGEN SATURATION: 99 % | RESPIRATION RATE: 16 BRPM | TEMPERATURE: 98.9 F | DIASTOLIC BLOOD PRESSURE: 60 MMHG

## 2020-05-08 DIAGNOSIS — M15.9 OSTEOARTHRITIS OF MULTIPLE JOINTS, UNSPECIFIED OSTEOARTHRITIS TYPE: ICD-10-CM

## 2020-05-08 DIAGNOSIS — E78.5 HYPERLIPIDEMIA LDL GOAL <100: ICD-10-CM

## 2020-05-08 DIAGNOSIS — Z71.89 ADVANCED DIRECTIVES, COUNSELING/DISCUSSION: Primary | ICD-10-CM

## 2020-05-08 LAB
ANION GAP SERPL CALCULATED.3IONS-SCNC: 3 MMOL/L (ref 3–14)
BUN SERPL-MCNC: 29 MG/DL (ref 7–30)
CALCIUM SERPL-MCNC: 10.2 MG/DL (ref 8.5–10.1)
CHLORIDE SERPL-SCNC: 108 MMOL/L (ref 94–109)
CO2 SERPL-SCNC: 30 MMOL/L (ref 20–32)
CREAT SERPL-MCNC: 1.28 MG/DL (ref 0.52–1.04)
ERYTHROCYTE [DISTWIDTH] IN BLOOD BY AUTOMATED COUNT: 14.1 % (ref 10–15)
GFR SERPL CREATININE-BSD FRML MDRD: 36 ML/MIN/{1.73_M2}
GLUCOSE SERPL-MCNC: 92 MG/DL (ref 70–99)
HCT VFR BLD AUTO: 37.9 % (ref 35–47)
HGB BLD-MCNC: 12.2 G/DL (ref 11.7–15.7)
MCH RBC QN AUTO: 31.3 PG (ref 26.5–33)
MCHC RBC AUTO-ENTMCNC: 32.2 G/DL (ref 31.5–36.5)
MCV RBC AUTO: 97 FL (ref 78–100)
PLATELET # BLD AUTO: 291 10E9/L (ref 150–450)
POTASSIUM SERPL-SCNC: 4.5 MMOL/L (ref 3.4–5.3)
RBC # BLD AUTO: 3.9 10E12/L (ref 3.8–5.2)
SODIUM SERPL-SCNC: 141 MMOL/L (ref 133–144)
WBC # BLD AUTO: 7.4 10E9/L (ref 4–11)

## 2020-05-08 PROCEDURE — 80048 BASIC METABOLIC PNL TOTAL CA: CPT | Performed by: INTERNAL MEDICINE

## 2020-05-08 PROCEDURE — 36415 COLL VENOUS BLD VENIPUNCTURE: CPT | Performed by: INTERNAL MEDICINE

## 2020-05-08 PROCEDURE — 85027 COMPLETE CBC AUTOMATED: CPT | Performed by: INTERNAL MEDICINE

## 2020-05-08 PROCEDURE — 99214 OFFICE O/P EST MOD 30 MIN: CPT | Performed by: INTERNAL MEDICINE

## 2020-05-08 NOTE — PROGRESS NOTES
Subjective     Rosie Guerrero is a 89 year old female who presents to clinic today for the following health issues:    HPI   ED/UC Followup:    Facility:  [facility]  Date of visit: 04/28/2020  Reason for visit: Fall, lower back pain  Current Status: Did an x-ray and found out that she has degenerative disc disease in lower back and that is where pain is coming from.          As above, ER documentation from 4/28 reviewed.  X-rays performed at that time showed no evidence of fracture, diffuse osteoarthritic changes.    Patient not complaining of significant pain, but has slowed down in general considerably since last visit with me.  She has moved to an assisted living facility, and is present today with her self-appointed healthcare power of .  Wishes to fill out POLST form.    Eating seems subjectively okay, but she is losing weight.  Energy level down slightly.  She is frustrated at having to remain in her room most of the time, as opposed to taking meals in common dining area with co-residents, etc.        Reviewed and updated as needed this visit by Provider  Tobacco  Allergies  Meds  Problems  Med Hx  Surg Hx  Fam Hx         Review of Systems   ROS COMP: Constitutional, HEENT, cardiovascular, pulmonary, GI, , musculoskeletal, neuro, skin, endocrine and psych systems are negative, except as otherwise noted.      Objective    /60 (BP Location: Left arm, Patient Position: Chair, Cuff Size: Adult Regular)   Pulse 74   Temp 98.9  F (37.2  C) (Oral)   Resp 16   Wt 40.6 kg (89 lb 9.6 oz)   SpO2 99%   BMI 13.62 kg/m    Body mass index is 13.62 kg/m .  Physical Exam   GENERAL: elderly, frail  NECK: no adenopathy, no asymmetry, masses, or scars and thyroid normal to palpation  RESP: lungs clear to auscultation - no rales, rhonchi or wheezes  CV: regular rate and rhythm, normal S1 S2, no S3 or S4, no murmur, click or rub, no peripheral edema and peripheral pulses strong  ABDOMEN: soft,  nontender, no hepatosplenomegaly, no masses and bowel sounds normal  MS: no edema            Assessment & Plan     1. Advanced directives, counseling/discussion  After discussion, patient reiterates desire to be designated DNR/DNI.  POLST form completed today, and will be scanned into record.  - DNR/DNI    2. Hyperlipidemia LDL goal <100  Check basic labs today  - CBC with platelets  - Basic metabolic panel  (Ca, Cl, CO2, Creat, Gluc, K, Na, BUN)    3. Osteoarthritis of multiple joints, unspecified osteoarthritis type  No evidence of fracture on previous fall, continue symptomatic therapy             Return in about 6 months (around 11/8/2020) for Med Check.    Drew Hall MD  [facility]

## 2020-05-14 ENCOUNTER — AMBULATORY - HEALTHEAST (OUTPATIENT)
Dept: OTHER | Facility: CLINIC | Age: 85
End: 2020-05-14

## 2020-05-14 ENCOUNTER — DOCUMENTATION ONLY (OUTPATIENT)
Dept: OTHER | Facility: CLINIC | Age: 85
End: 2020-05-14

## 2020-05-19 ENCOUNTER — MEDICAL CORRESPONDENCE (OUTPATIENT)
Dept: HEALTH INFORMATION MANAGEMENT | Facility: CLINIC | Age: 85
End: 2020-05-19

## 2020-09-29 NOTE — TELEPHONE ENCOUNTER
Please fax an order for [facility] for stool softener or Miralax .Melita Gastelum RN  Left message for RN to call back for additional information.Melita Gastelum RN

## 2020-09-29 NOTE — TELEPHONE ENCOUNTER
Call back from [facility]. They did receive the order that was faxed to them.     No further follow up needed.

## 2020-09-29 NOTE — TELEPHONE ENCOUNTER
Reason for Call:  Other patient has been constipation problems and needs something    Detailed comments: they did give patient prune juice yesterday and not working    Phone Number Patient can be reached at: Other phone number: [facility]  [phone] RN can fax over the order  [facility]    Best Time: asap    Can we leave a detailed message on this number? YES    Call taken on 9/29/2020 at 1:24 PM by DRE MADRID

## 2020-11-02 NOTE — ED NOTES
Bed: ED14  Expected date:   Expected time:   Means of arrival:   Comments:  Baljeet Scanlon Found down right leg pain 89 f

## 2020-11-02 NOTE — PHARMACY-ADMISSION MEDICATION HISTORY
Pharmacy Medication History  Admission medication history interview status for the 11/2/2020  admission is complete. See EPIC admission navigator for prior to admission medications       Medication history sources: Medication list from [facility] assisted living  Location of interview: N/A  Medication history source reliability: Good    Medication reconciliation completed by provider prior to medication history? No    Time spent in this activity: 40 minutes      Prior to Admission medications    Medication Sig Last Dose Taking? Auth Provider   ascorbic acid (VITAMIN C) 500 MG tablet Take 500 mg by mouth daily  11/1/2020 at 0830 Yes Drew Hall MD   aspirin 81 MG EC tablet Take 1 tablet (81 mg) by mouth daily 11/1/2020 at 0830 Yes Drew Hall MD   calcium carbonate (OS-PATRICIA 500 MG [place]. [place]) 500 MG tablet Take 500 mg by mouth daily 11/1/2020 at 1345 Yes Unknown, Entered By History   coenzyme Q-10 10 MG CAPS Take 10 mg by mouth daily  11/1/2020 at 1715 Yes Drew Hall MD   FISH -[id number] MG OR CAPS Take 1 capsule by mouth daily  11/1/2020 at 1715 Yes Drew Hall MD   ginkgo biloba 60 MG CAPS Take 1 capsule (60 mg) by mouth daily 11/1/2020 at 1715 Yes Drew Hall MD   LUTEIN PO Take 40 mg by mouth daily 11/1/2020 at 0830 Yes Reported, Patient   magnesium chloride 535 (64 MG) MG TBCR CR tablet Take 535 mg by mouth daily 11/1/2020 at 1345 Yes Unknown, Entered By History   multivitamin w/minerals (THERA-VIT-M) tablet Take 1 tablet by mouth daily 11/1/2020 at 1715 Yes Unknown, Entered By History   polyethylene glycol (MIRALAX) 17 g packet Take 17 g by mouth daily as needed for constipation as needed Yes Drew Hall MD   vitamin  B complex with vitamin C (VITAMIN  B COMPLEX) TABS Take 1 tablet by mouth daily 11/1/2020 at 0830 Yes Drew Hall MD   vitamin E (TOCOPHEROL) 400 units (180 mg) capsule Take 800 Units by mouth daily 11/1/2020 at 1715 Yes Unknown,  Entered By History   Zinc 15 MG CAPS Take 15 mg by mouth daily 11/1/2020 at 1345 Yes Unknown, Entered By History

## 2020-11-02 NOTE — ED PROVIDER NOTES
History   Chief Complaint:  Fall    The history is provided by the patient and the EMS personnel. The history is limited by the condition of the patient.     Rosie Guerrero is a 89 year old female with a previous history of stroke who presents via EMS for evaluation after an unwitnessed fall. This morning, staff at the patient's assisted living facility found her on the floor, incontinent of urine, when they went into her room to administer her morning medications. Given the concern for an unwitnessed fall, possibly around 2200 yesterday, staff called 911. En route, EMS placed the patient in a C-collar. Here, the patient complains of right hip/upper leg pain only. She denies any other injuries from the fall. She also notes a cough, productive of white phlegm, that she has had for about the last 5 days.     Allergies:  Hmg-Coa-R Inhibitors  Penicillins  Sulfa Drugs    Medications:    Baby aspirin   Miralax     Past Medical History:    Generalized osteoarthrosis   Macular degeneration   Hyperlipidemia  Skin cancer   Rubella   CVA    Past Surgical History:    Tonsillectomy & Adenoidectomy   Hysterectomy, bilateral salpingo-oophorectomy      Family History:    Dementia  Cerebrovascular disease     Social History:  Marital Status: .   Presents with EMS  Former smoker. Quit 1974.   Positive for alcohol use. Comment: occasional.   Negative for drug use.     RoS limited secondary to the condition of the patient and lack of caregiver.   Review of Systems   Respiratory: Positive for cough.    Musculoskeletal:        R. Leg pain       Physical Exam     Patient Vitals for the past 24 hrs:   BP Temp Temp src Pulse Resp SpO2   11/02/20 1330 (!) 143/100 -- -- 91 -- --   11/02/20 1031 (!) 174/82 98.8  F (37.1  C) Rectal 68 18 99 %        Physical Exam  Constitutional: Alert, Cachetic, chronically ill appearing. Emaciated.  HENT:    Nose: Nose normal.    Mouth/Throat: Oropharynx is clear, mucous membranes are moist  Eyes:  EOM are normal. Pupils are equal, round, and reactive to light.   CV: Regular rate and rhythm, no murmurs, rubs or gallops.  Resp: Poor inspiratory efforts. Diminished lung sounds both bases. Crackles to the right upper lung field.   GI: Soft, non-distended. There is no tenderness. No rebound or guarding.   MSK: Restrained in a C-collar. Tenderness over the right hip and mid shaft femur. Decreased range of motion secondary to generalized weakness, as well as pain. No deformity.   Neurological:   A/Ox3  4/5 strength is symmetric to the upper and lower extremities;   Sensation intact to light touch throughout the upper and lower extremities;   Skin: Skin is warm and dry.    Emergency Department Course     Imaging:  Radiologic findings were communicated with the patient who voiced understanding of the findings.  CT Head without contrast:   1. No intracranial bleed or skull fractures are identified.   2. There is diffuse parenchymal volume loss.  White matter changes are   present in the cerebral hemispheres that are consistent with small   vessel ischemic disease in this age patient..   3. Chronic encephalomalacia in the right occipital-parietal region.   This was present on the scan from 2017, as per radiology.    CT Cervical Spine without contrast:   1. No fractures are identified.   2. Multilevel degenerative changes, as per radiology.    XR Pelvis w/ Hip Port Right, 1 view:  1.  No fracture identified in the pelvis or hips.   2.  Normal bilateral hip joint alignment. Mild bilateral hip joint   space narrowing.   3.  Degenerative changes in the lower lumbar spine and SI joints, as per radiology.     XR Chest Port 1 view:   Portable chest. Lungs are hyperinflated but otherwise   clear likely related to COPD. Heart is normal in size. No   pneumothorax. No definite pleural effusions, as per radiology.     Laboratory:  Laboratory findings were communicated with the patient who voiced understanding of the findings.  CBC:  WBC: 116 (H), HGB: 12.5, PLT: 263  CMP: Cl: 110 (H), GFRe: 51 (L), Albumin: 3.1 (L), Protein total: 6.6 (L), o/w WNL (Creatinine: 0.96)    Troponin: 0.020  CK total: 215  Lactic acid: 1.0   Blood cultures x2: Pending     Symptomatic COVID-19 Virus PCR: Pending    UA with Microscopic: Ketones: 5 (A), Mucous: Present (A), o/w WNL     Emergency Department Course:  Nursing notes and vitals reviewed.   1000: I performed an exam of the patient as documented above.      IV was inserted and blood was drawn for laboratory testing, results above.   The patient was sent for head and cervical spine CTs while in the emergency department, results above. Portable right hip and chest x-rays were obtained in the ED as well, findings above.   The patient's nose was swabbed and this sample was sent for COVID testing.     1336: I rechecked the patient and discussed the results of her workup.    1418: Paola, ED care coordinator, spoke with me regarding her conversations with the patient's assisted living facility. She notes the facility has an open long term bed that will be available in the next few days. Will continue looking.     1440: Per Paola: Family would like the patient to go back to the assisted living facility and are working with the healthcare agent. Please see her ED note for additional details. They will care for the patient in her own environment until the long term bed becomes available. Otherwise, the patient is safe for discharge back to her facility via EMS.     Findings and plan explained to the Patient and caregivers. Patient discharged home with instructions regarding supportive care, medications, and reasons to return. The importance of close follow-up was reviewed.     I personally reviewed the laboratory and imagin results with the Patient and answered all related questions prior to discharge.    Impression & Plan      Covid-19  Rosie Guerrero was evaluated during a global COVID-19 pandemic, which necessitated  consideration that the patient might be at risk for infection with the SARS-CoV-2 virus that causes COVID-19.   Applicable protocols for evaluation were followed during the patient's care.   COVID-19 was considered as part of the patient's evaluation. The plan for testing is:  a test was obtained during this visit.    Medical Decision Making:   This is a 89-year-old female who comes in after a fall and was found down.  Exam as above.  Labs and imaging were obtained.  CT head and C-spine were negative.  Patient's C-spine and c-collar were cleared and the c-collar was removed.  X-ray of the chest did not reveal pneumonia.  X-ray of the right hip and pelvis were negative for acute fracture.  Unclear etiology behind the patient's fall however I do not believe that she warrants inpatient admission or observation admission.  Paola, the care coordinator in the ER was able to facilitate the patient's return back to her assisted living with higher cares and likely transition to a long-term care facility bed which I have available at their facility.  Patient was discharged home in stable condition by stretcher ambulance.    Diagnosis:     ICD-10-CM    1. Fall, initial encounter  W19.XXXA    2. Contusion of right hip and thigh, initial encounter  S70.01XA     S70.11XA    3. Dementia without behavioral disturbance, unspecified dementia type (H)  F03.90        Disposition:   Discharged to home via EMS    Scribe Disclosure:  Farideh DEMARCO, am serving as a scribe on 11/2/2020 at 9:58 AM to personally document services performed by Quentin Bañuelos DO based on my observations and the provider's statements to me.       EMERGENCY DEPARTMENT     Quentin Bañuelos DO  11/02/20 1600

## 2020-11-02 NOTE — ED TRIAGE NOTES
Last seen 10pm 11/1  by staff found on floor of bathroom in her assist living [facility] in ED c/o right hip pain

## 2020-11-02 NOTE — ED NOTES
I was asked to assist this patient with a discharge plan.  This patient is at [facility].  She came to the ER d/t a fall today but her work up is negative.  I called [facility] and they do have a bed but Nikky wanted to speak with her DON first.  Nikky also said they have a LTC bed that just opened up today (but is not ready for admit today).  I faxed Nikky the referral and after she spoke with the DON they felt it would be best if this patient returned to her [facility] with increased services until the LTC bed is available.      I then spoke with Daina Mary this patients nurse in [facility] and she states this patient does walk and is supposed to use a walker but doesn't like to.   I let Amanda know that there is a LTC bed open, TCU prefers this patient return to [facility] and she agreed to accept this patient back with increased services. Daina Mary offered to work with this patients [facility] to get increased services.  Daina Mary wanted the lab/xray results faxed to [phone].  I did fax the above and the discharge paperwork to Amanda.  I also called the [facility] and left a VM re: the above. I left my call back number for her to call me with questions.   I updated the ER staff that this patient can return to her facility. The Bedside RN will arrange transportation.

## 2020-11-02 NOTE — ED AVS SNAPSHOT
[facility] Emergency Dept  [address] [address]  Phone: [phone]  Fax: [phone]                                    Rosie Guerrero   MRN: [id number]    Department: [facility] Emergency Dept   Date of Visit: 11/2/2020           After Visit Summary Signature Page    I have received my discharge instructions, and my questions have been answered. I have discussed any challenges I see with this plan with the nurse or doctor.    ..........................................................................................................................................  Patient/Patient Representative Signature      ..........................................................................................................................................  Patient Representative Print Name and Relationship to Patient    ..................................................               ................................................  Date                                   Time    ..........................................................................................................................................  Reviewed by Signature/Title    ...................................................              ..............................................  Date                                               Time          22EPIC Rev 08/18

## 2020-11-03 NOTE — ED PROVIDER NOTES
"History     Chief Complaint:  Waiting for bed and nursing home placement    HPI History limited due to dementia. History obtained via EMS and earlier report from today.   Rosie Guerrero is a 89 year old female with a history of stroke who presents for evaluation of unwitnessed fall. The patient was present in the ER for a majority of the day after being brought here after she had fallen last night and bene found this morning; imaging from earlier as below. The patient was extensively worked on by Paola the care coordinator to align resources for her to be transitioned to higher levels of care, which was successfully arranged for tomorrow. She was intended to return to her nursing home and await bed placement tomorrow. Upon arrival to her home the on site aid stated that he could not accept her as they did not have enough assistance to move her. She then returned to the ED, where and when nursing was contacted and informed from her home RN that the patient should have been accepted back and she will \"ensure there is staffing to bring her inside.\"     CT Head without contrast:   1. No intracranial bleed or skull fractures are identified.   2. There is diffuse parenchymal volume loss.  White matter changes are   present in the cerebral hemispheres that are consistent with small   vessel ischemic disease in this age patient..   3. Chronic encephalomalacia in the right occipital-parietal region.   This was present on the scan from 2017, as per radiology.     CT Cervical Spine without contrast:   1. No fractures are identified.   2. Multilevel degenerative changes, as per radiology.     XR Pelvis w/ Hip Port Right, 1 view:  1.  No fracture identified in the pelvis or hips.   2.  Normal bilateral hip joint alignment. Mild bilateral hip joint   space narrowing.   3.  Degenerative changes in the lower lumbar spine and SI joints, as per radiology.      XR Chest Port 1 view:   Portable chest. Lungs are hyperinflated but " otherwise   clear likely related to COPD. Heart is normal in size. No   pneumothorax. No definite pleural effusions, as per radiology.     Allergies:  Hmg-Coa-R Inhibitors  Penicillins  Sulfa Drugs     Medications:    Baby aspirin   Miralax      Past Medical History:    Generalized osteoarthrosis   Macular degeneration   Hyperlipidemia  Skin cancer   Rubella   CVA     Past Surgical History:    Tonsillectomy & Adenoidectomy   Hysterectomy, bilateral salpingo-oophorectomy       Family History:    Dementia  Cerebrovascular disease      Social History:  Marital Status: .   Presents with EMS  Former smoker. Quit 1974.   Positive for alcohol use. Comment: occasional.   Negative for drug use.     Review of Systems   Reason unable to perform ROS: dementia      Physical Exam     Patient Vitals for the past 24 hrs:   BP Temp Temp src Pulse SpO2   11/02/20 2031 (!) 153/80 97.7  F (36.5  C) Oral 68 98 %          Physical Exam  General: Demented, appears elderly and frail  HEENT:  Head:  Atraumatic  Ears:  External ears are normal  Mouth/Throat:  Oropharynx is without erythema or exudate and mucous membranes are moist.   Eyes:   Conjunctivae normal and EOM are normal. No scleral icterus.    Pupils are equal, round, and reactive to light.   CV:  Normal rate, regular rhythm, normal heart sounds and radial pulses are 2+ and symmetric.  No murmur.  Resp:  Breath sounds are clear bilaterally    Non-labored, no retractions or accessory muscle use  GI:  Abdomen is soft, no distension, no tenderness. No rebound or guarding.    MS:  Normal range of motion. No edema.    Normal strength in all 4 extremities.     Back atraumatic.    No midline cervical, thoracic, or lumbar tenderness  Skin:  Warm and dry.  No rash or lesions noted.  Neuro: Demented.  Moving all extremities.  GCS: 14  Psych:  Unable to assess due to dementia.     Emergency Department Course     Emergency Department Course:    7124 Nursing notes and vitals  reviewed.   I performed an exam of the patient as documented above.     2028 Findings and plan explained to the Patient. Patient discharged home with instructions regarding supportive care, medications, and reasons to return. The importance of close follow-up was reviewed.     Impression & Plan     Medical Decision Making:  Rosie Guerrero is a 89 year old female who presents to the emergency department after being transported by EMS back to her assisted living facility.  Unfortunately there was a miscommunication with staff at the nursing home/assisted living care facility that [facility]  They state they did not have enough staff to accept her although this was a miscommunication and ultimately nursing cares at her assisted living facility did contact our nurses in the emergency department stating that they do have appropriate staff and that she is reasonable to [facility].  There is plan for transition to a long-term care facility bed tomorrow and this was coordinated at length with our care coordinator in the emergency department earlier today.  Of note, she was found down after a unwitnessed fall earlier today and had a broad traumatic work-up performed earlier today.  This was unremarkable.  Additionally she had blood work and urinalysis performed which was unremarkable.  Patient currently was accepted back to her assisted living facility and sent by EMS.  No change to plan as previously mentioned in her prior ED visit [facility].      Diagnosis:     ICD-10-CM    1. Fall, subsequent encounter  W19.XXXD         Disposition:  Discharged to home.    Scribe Disclosure:  I, Eliel Baxter, am serving as a scribe at 7:09 PM on 11/2/2020 to document services personally performed by Malick Briggs MD based on my observations and the provider's statements to me.     Lyndon CenterMalick Randle MD  11/02/20 0354       Brigitte  MD Malick  11/02/20 5899

## 2020-11-03 NOTE — ED TRIAGE NOTES
Pt was discharged back to her nursing home. EMS brought pt back saying they coupd not care for her due to lack of staff.

## 2020-11-05 NOTE — TELEPHONE ENCOUNTER
Daina from [facility] calling. Patient fell last Monday. Sent to ER and came home. Still complains of pain in shoulder and back. Wanting order for pain med. Does not have any PRNs,  No pain meds on med list per Daina.    Please advise. Does she need appt?    Also Daina notes patient is still in assisted living. ER  Notes say she is moving to long term care facility bed 11/3 but this has not yet happened, she is trying to arrange for her to go to long term bed.    Call back Daina at [phone]

## 2020-11-06 NOTE — TELEPHONE ENCOUNTER
Tried to call Daina, left message.  Spoke with Leobardo on the patients unit at the [facility].  I sent RX to her usual pharmacy [facility], and I also sent a copy of the RX to [facility] (fax )  Tramadol pain medication 25-50 mg three times per day as needed to help take edge off the pain.    This may cause drowsiness and constipation, stomach upset.  Stop if any side effects.

## 2020-11-17 NOTE — TELEPHONE ENCOUNTER
Alison with [facility] calling, [phone]    Needs ordered to transfer patient to long term care ASAP.     Phoebe RESENDIZ BSN, RN, PHN

## 2020-11-18 NOTE — TELEPHONE ENCOUNTER
Care center needs form today so pt can transfer to LTC Please complete and sign medication list . On MD desk. .Melita Gastelum RN

## 2020-11-19 PROBLEM — Z91.81 PERSONAL HISTORY OF FALL: Status: ACTIVE | Noted: 2020-01-01

## 2020-11-19 PROBLEM — I69.30 UNSPECIFIED SEQUELAE OF CEREBRAL INFARCTION: Status: ACTIVE | Noted: 2020-01-01

## 2020-11-19 PROBLEM — Z00.00 ANNUAL PHYSICAL EXAM: Status: ACTIVE | Noted: 2020-01-01

## 2020-11-19 NOTE — PROGRESS NOTES
"[facility]  PRIMARY CARE PROVIDER AND CLINIC:  Deena Main RN, CNP and Dr Gwendolyn Segura MD,  Geriatrics  Chief Complaint   Patient presents with     [facility]     [facility] Medical Record Number:  [id number]  Place of Service where encounter took place:  [facility] ([facility]) [[id number]]    Rosie Guerrero  is a 89 year old  (5/25/1928), admitted to the above facility from [facility] on 11/19/20. ..  Admitted to this facility for  rehab, medical management and nursing care.    HPI:    HPI information obtained from: facility chart records, facility staff and [facility] chart review.   Brief Summary of Hospital Course: pt was sent to the ER after a fall, the staff in the [facility] AL, was not willing to take pt back as they said she had been awaiting a bed in LTC, due to her increased care needs. They could no longer care for her. She was then sent back to the ER, then yesterday to [facility] for LTC.    TODAY DURING EXAM HPI and ROS: a little [place], sleepy but answers mors question appropriately.  No CP, SOB, Cough, HA, N/V, dysuria or Bowel Abnormalities. Appetite is \"I am hungry and can I have oatmeal today?\" .  No pain at this time. Not sure where she is.  Per staff, she had a quiet night.     CODE STATUS/ADVANCE DIRECTIVES DISCUSSION:   DNR / DNI  Patient's living condition: lives in an assisted living facility  ALLERGIES: Lipitor [hmg-coa-r inhibitors], Penicillin [penicillins], and Sulfa drugs  PAST MEDICAL HISTORY:  has a past medical history of Generalized osteoarthrosis, unspecified site, Macular degeneration (senile) of retina, unspecified, Other and unspecified hyperlipidemia, Other malignant neoplasm of skin of trunk, except scrotum, Personal history of fall (11/2020), and Rubella without mention of complication.  PAST SURGICAL HISTORY:   has a past surgical history that includes hysterectomy, fuad (1970); REMOVE TONSILS/ADENOIDS,<11 Y/O (1934); " COLONOSCOPY THRU STOMA, DIAGNOSTIC (1994); and flex sig (medicare pt.) (1996).  FAMILY HISTORY: family history includes Cerebrovascular Disease in her father; Neurologic Disorder in her mother.  SOCIAL HISTORY:   reports that she quit smoking about 46 years ago. She has never used smokeless tobacco. She reports current alcohol use. She reports that she does not use drugs.    Post Discharge Medication Reconciliation Status: discharge medications reconciled and changed, per note/orders     Current Outpatient Medications   Medication Sig Dispense Refill     acetaminophen (TYLENOL) 325 MG tablet Take 650 mg by mouth 4 times daily as needed for mild pain       ascorbic acid (VITAMIN C) 500 MG tablet Take 500 mg by mouth daily  30 tablet      aspirin 81 MG EC tablet Take 1 tablet (81 mg) by mouth daily 90 tablet 3     calcium carbonate (OS-PATRICIA 500 MG [place]. CA) 500 MG tablet Take 500 mg by mouth daily       coenzyme Q-10 10 MG CAPS Take 10 mg by mouth daily  30 capsule      FISH -[id number] MG OR CAPS Take 1 capsule by mouth daily IF CANT GET FROM MERWINS use fish oil 1000 po BID or merwins equivlent.  0     ginkgo biloba 60 MG CAPS Take 1 capsule (60 mg) by mouth daily 60 capsule      LUTEIN PO Take 40 mg by mouth daily       magnesium chloride 535 (64 MG) MG TBCR CR tablet Take 535 mg by mouth daily       multivitamin w/minerals (THERA-VIT-M) tablet Take 1 tablet by mouth daily       polyethylene glycol (MIRALAX) 17 g packet Take 17 g by mouth daily as needed for constipation 30 packet 3     traMADol (ULTRAM) 50 MG tablet Take 25-50 mg by mouth every 6 hours as needed for severe pain 25 mg po for pain 1-5, 50 mg for pain 6-10.       vitamin  B complex with vitamin C (VITAMIN  B COMPLEX) TABS Take 1 tablet by mouth daily  0     vitamin E (TOCOPHEROL) 400 units (180 mg) capsule Take 800 Units by mouth daily       Zinc 15 MG CAPS Take 15 mg by mouth daily             ROS:see above under HPI    Vitals:  BP (!) 166/70    "Pulse 65   Temp 97.8  F (36.6  C)   Resp 18   Ht 1.727 m (5' 8\")   Wt 40.1 kg (88 lb 6.4 oz)   SpO2 97%   BMI 13.44 kg/m    Exam:  GENERAL APPEARANCE:  in no distress, cooperative, sleepy  ENT:  Mouth and posterior oropharynx normal, moist mucous membranes, [place]  EYES:  Conjunctiva and lids normal  NECK:  No adenopathy,masses or thyromegaly  RESP:  respiratory effort and palpation of chest normal, lungs clear to auscultation , no respiratory distress, diminished breath sounds    CV:  Palpation and auscultation of heart done , regular rate and rhythm, no murmur, rub, or gallop, no edema  ABDOMEN:  normal bowel sounds, soft, nontender, no hepatosplenomegaly or other masses  M/S:   CANNON equally--seen in bed  SKIN:  Inspection of skin and subcutaneous tissue baseline  NEURO:   Cranial nerves 2-12 are normal tested and grossly at patient's baseline  PSYCH:  insight and judgement impaired, memory impaired , affect and mood normal    Lab/Diagnostic data:  Recent Labs   Lab Test 11/02/20  1209 05/08/20  1136    141   POTASSIUM 4.6 4.5   CHLORIDE 110* 108   CO2 29 30   ANIONGAP 3 3   GLC 95 92   BUN 25 29   CR 0.96 1.28*   PATRICIA 9.7 10.2*     CBC RESULTS:   Recent Labs   Lab Test 11/02/20  1209   WBC 11.6*   RBC 4.01   HGB 12.5   HCT 38.0   MCV 95   MCH 31.2   MCHC 32.9   RDW 13.9        ASSESSMENT / PLAN:  (Z91.81) Personal history of fall  (primary encounter diagnosis)  (H35.30) Macular degeneration, unspecified laterality, unspecified type  (M19.90) Arthritis  Comment/Plan: multifactorial, dementia and weakness, mac deg, arthritis likely contribute: no acute injuries, needs 24 hr cares with ADl's. So now LTC. Tramadol prn for arthritic pains, add Tylenol, monitor.    (F03.90) Dementia without behavioral disturbance, unspecified dementia type (H)  Comment/Plan: various supplements, will keep for now, consider dc    (Z86.73) History of CVA (cerebrovascular accident)  (E78.5) Hyperlipidemia, unspecified " hyperlipidemia type  Comment/Plan: asa, fish oil, coenzyme, no changes today.     (Z00.00) Annual physical exam: 11/19/2020  Comment: review immunizations, flu shot,       Total time spent with patient visit at the skilled nursing facility was 40 including patient visit, review of past records and phone call to patient contact--msg left on her personal phone. Greater than 50% of total time spent with counseling and coordinating care due to see above..     Electronically signed by:  ZAY Lafleur CNP

## 2020-12-26 NOTE — TELEPHONE ENCOUNTER
Resident trying to chew long acting Mg++ and unable to swallow the size of the pill    Called [facility] and ordered Mg++ 400mg po daily.  They will send it out to facility.    Called facility again to give the order, discontinue the old order and Mg++ level on 1/11/21      Electronically signed by Vernoica Aranda RN, CNP

## 2020-12-27 PROBLEM — N18.30 CHRONIC KIDNEY DISEASE, STAGE 3 (H): Status: ACTIVE | Noted: 2020-01-01

## 2020-12-27 NOTE — PROGRESS NOTES
"Rosie Guerrero is a 89 year old female seen December 17, 2020 at [facility] where she has resided for one month (admit 11/2020) seen for initial visit.     Pt is seen in her room up to recliner holding a brayan bear   She is dozy, but awakens and smiles.   Asks \"Is it morning?\"   When asked if she has pain, she says \"yes in my legs, but I'm used to it.\"      By chart review, pt was living in AL at [facility], but with cognitive and physical decline, higher care needs.   She was seen in the ED in November after a fall.   No significant injuries found on imaging, but determined that her AL could no longer meet her needs and she has been transferred to LTC for permanent placement.    Patient had a stroke in 2017, with ischemic infarcts in the right parieto-occipital lobe on imaging, and high grade stenosis of right external carotid at its origin.  She was seen by Neurology, not a candidate for tPA.  Started on ASA, had Stroke Rehab and returned to her AL apartment.   She has longstanding hyperlipidemia, intolerant to all statins.   She is otherwise fairly well, takes few prescription medications, but does take a lot of vitamins and supplements.          Past Medical History:   Diagnosis Date     Generalized osteoarthrosis, unspecified site     Hx of cervicalgia, and sciatica, resolved with PT     Macular degeneration (senile) of retina, unspecified      Other and unspecified hyperlipidemia      Other malignant neoplasm of skin of trunk, except scrotum     Multiple Basal cell CA excisions     Personal history of fall 11/2020     Rubella without mention of complication     Past history of Rubella       Past Surgical History:   Procedure Laterality Date     FLEX SIG (MEDICARE PT.)  1996    NORMAL     HC COLONOSCOPY THRU STOMA, DIAGNOSTIC  1994    NORMAL     HC REMOVE TONSILS/ADENOIDS,<11 Y/O  1934     HYSTERECTOMY, KEMI  1970    With BSO, secondary to fibroids       Family History   Problem " "Relation Age of Onset     Neurologic Disorder Mother         Cerebrovascular disease, dementia,  age 97     Cerebrovascular Disease Father          age 79       Social History     Tobacco Use     Smoking status: Former Smoker     Quit date: 1974     Years since quittin.0     Smokeless tobacco: Never Used   Substance Use Topics     Alcohol use: Yes     Alcohol/week: 0.0 standard drinks     Comment: occassionally      SH:  Resident of [facility], .    States \"I had 2 husbands and no children.\"     No siblings.   Her Healthcare Agent is Ash Koo    Review Of Systems  Skin: h/o skin cancers  Eyes: impaired vision  Ears/Nose/Throat: hearing loss  Respiratory: No shortness of breath, dyspnea on exertion, cough, or hemoptysis  Cardiovascular: negative  Gastrointestinal: poor appetite  Wt Readings from Last 5 Encounters:   20 40.1 kg (88 lb 6.4 oz)   20 40.6 kg (89 lb 9.6 oz)   20 42.6 kg (94 lb)   19 42.6 kg (93 lb 14.4 oz)   18 43.6 kg (96 lb 3.2 oz)      Genitourinary: negative  Musculoskeletal: generalized weakness, fall risk.   Ambulates with 4WW and assist for transfers, ADLs.      Neurologic: dementia  Psychiatric: negative  Hematologic/Lymphatic/Immunologic: negative  Endocrine: negative      GENERAL APPEARANCE: alert and no distress, frail  BP (!) (P) 143/77   Pulse (P) 75   Temp (P) 97.7  F (36.5  C)    HEENT: normocephalic, no lesion or abnormalities  NECK: no adenopathy, no asymmetry, masses, or scars and thyroid normal to palpation  RESP: decreased BS, no rales or wheeze  CV: regular rate and rhythm, normal S1 S2, 1/6 JH  ABDOMEN:  soft, nontender, no HSM or masses and bowel sounds normal  MS: extremities normal- no ext edema  SKIN: no suspicious lesions or rashes  NEURO: disorientation, STML, some confusion  PSYCH: affect okay, pleasant  LYMPHATICS: No cervical,  or supraclavicular nodes     Last Comprehensive Metabolic Panel:  Sodium   Date Value Ref Range " Status   11/02/2020 142 133 - 144 mmol/L Final     Potassium   Date Value Ref Range Status   11/02/2020 4.6 3.4 - 5.3 mmol/L Final     Chloride   Date Value Ref Range Status   11/02/2020 110 (H) 94 - 109 mmol/L Final     Carbon Dioxide   Date Value Ref Range Status   11/02/2020 29 20 - 32 mmol/L Final     Anion Gap   Date Value Ref Range Status   11/02/2020 3 3 - 14 mmol/L Final     Glucose   Date Value Ref Range Status   11/02/2020 95 70 - 99 mg/dL Final     Urea Nitrogen   Date Value Ref Range Status   11/02/2020 25 7 - 30 mg/dL Final     Creatinine   Date Value Ref Range Status   11/02/2020 0.96 0.52 - 1.04 mg/dL Final     GFR Estimate   Date Value Ref Range Status   11/02/2020 51 (L) >60 mL/min/[1.73_m2] Final     Comment:     Non  GFR Calc  Starting 12/18/2018, serum creatinine based estimated GFR (eGFR) will be   calculated using the Chronic Kidney Disease Epidemiology Collaboration   (CKD-EPI) equation.       Calcium   Date Value Ref Range Status   11/02/2020 9.7 8.5 - 10.1 mg/dL Final     Lab Results   Component Value Date    AST 24 11/02/2020      ALBUMIN 3.1 11/02/2020     Lab Results   Component Value Date    PROTTOTAL 6.6 11/02/2020      Lab Results   Component Value Date    ALKPHOS 78 11/02/2020     Lab Results   Component Value Date    WBC 11.6 11/02/2020      HGB 12.5 11/02/2020      MCV 95 11/02/2020       11/02/2020     ECHO 1/2017   Interpretation Summary  1. The left ventricle is normal in structure, function and size. The visual ejection fraction is estimated at 65%.  2. The right ventricle is normal in structure, function and size.  3. There is no atrial shunt seen. A contrast injection (Bubble Study) was performed that was negative for flow across the interatrial septum.  4. No valve disease.    CT SCAN OF THE HEAD WITHOUT CONTRAST   11/2/2020 1:22 PM   HISTORY: unwitnessed fall, head pain  FINDINGS:   Intracranial contents: The patient's head is tilted towards the  right.  There is diffuse parenchymal volume loss.  White matter changes are  present in the cerebral hemispheres that are consistent with small  vessel ischemic disease in this age patient. There is a chronic area  of encephalomalacia in the right occipital-parietal region. There is  no evidence of intracranial hemorrhage, mass, acute infarct or anomaly.                                                                 IMPRESSION:   1. No intracranial bleed or skull fractures are identified.  2. There is diffuse parenchymal volume loss.  White matter changes are  present in the cerebral hemispheres that are consistent with small vessel ischemic disease in this age patient.  3. Chronic encephalomalacia in the right occipital-parietal region.  This was present on the scan from 2017.       IMP/PLAN:   (I67.9) Cerebral vascular disease    (Z86.73) History of CVA (cerebrovascular accident)  Comment: right parieto-occipital ischemic stroke in 2017      Seen by Neurology at the time and no further workup recommended.     Plan: daily ASA for secondary prevention    (N18.31) Stage 3a chronic kidney disease  Comment: may be secondary to HTN  BP Readings from Last 3 Encounters:   12/17/20 (!) (P) 143/77   11/18/20 (!) 166/70   11/02/20 (!) 153/80      Plan: would consider addition of anti-hypertensive if SBP persistently >150      Follow BMP      (H35.30) Macular degeneration, unspecified laterality, unspecified type  Comment: very poor vision     Plan: supportive care, AREDS       (F03.90) Dementia without behavioral disturbance, unspecified dementia type (H)  Comment: significant decline in functional status   Plan: LTC support for meds, meals, activity       (E78.5) Hyperlipidemia, unspecified hyperlipidemia type  Comment: longstanding      Plan: intolerant to statins, will not attempt treatment at this point        (R63.4) Weight loss  Comment: declining weight over past few years     Plan: preferences, nutritional  supplements, follow weights as she transitions to LTC    Would look to stopping the multiple vitamins and supplements she is taking, lower her pill burden and hopefully improve appetite.        Gwendolyn Segura MD

## 2021-01-01 ENCOUNTER — APPOINTMENT (OUTPATIENT)
Dept: GENERAL RADIOLOGY | Facility: CLINIC | Age: 86
DRG: 481 | End: 2021-01-01
Attending: EMERGENCY MEDICINE
Payer: COMMERCIAL

## 2021-01-01 ENCOUNTER — HOSPITAL ENCOUNTER (INPATIENT)
Facility: CLINIC | Age: 86
LOS: 4 days | Discharge: NURSING, REHAB OR LONG-TERM CARE | DRG: 481 | End: 2021-06-08
Attending: EMERGENCY MEDICINE | Admitting: INTERNAL MEDICINE
Payer: COMMERCIAL

## 2021-01-01 ENCOUNTER — HOSPITAL ENCOUNTER (INPATIENT)
Facility: CLINIC | Age: 86
LOS: 5 days | Discharge: NURSING, REHAB OR LONG-TERM CARE | DRG: 177 | End: 2021-06-16
Attending: EMERGENCY MEDICINE | Admitting: STUDENT IN AN ORGANIZED HEALTH CARE EDUCATION/TRAINING PROGRAM
Payer: COMMERCIAL

## 2021-01-01 ENCOUNTER — TELEPHONE (OUTPATIENT)
Dept: GERIATRICS | Facility: CLINIC | Age: 86
End: 2021-01-01

## 2021-01-01 ENCOUNTER — APPOINTMENT (OUTPATIENT)
Dept: GENERAL RADIOLOGY | Facility: CLINIC | Age: 86
DRG: 177 | End: 2021-01-01
Attending: EMERGENCY MEDICINE
Payer: COMMERCIAL

## 2021-01-01 ENCOUNTER — APPOINTMENT (OUTPATIENT)
Dept: CT IMAGING | Facility: CLINIC | Age: 86
DRG: 481 | End: 2021-01-01
Attending: EMERGENCY MEDICINE
Payer: COMMERCIAL

## 2021-01-01 ENCOUNTER — NURSING HOME VISIT (OUTPATIENT)
Dept: GERIATRICS | Facility: CLINIC | Age: 86
End: 2021-01-01
Payer: COMMERCIAL

## 2021-01-01 ENCOUNTER — APPOINTMENT (OUTPATIENT)
Dept: SPEECH THERAPY | Facility: CLINIC | Age: 86
DRG: 481 | End: 2021-01-01
Attending: PHYSICIAN ASSISTANT
Payer: COMMERCIAL

## 2021-01-01 ENCOUNTER — ANESTHESIA EVENT (OUTPATIENT)
Dept: SURGERY | Facility: CLINIC | Age: 86
DRG: 481 | End: 2021-01-01
Payer: COMMERCIAL

## 2021-01-01 ENCOUNTER — RECORDS - HEALTHEAST (OUTPATIENT)
Dept: LAB | Facility: CLINIC | Age: 86
End: 2021-01-01

## 2021-01-01 ENCOUNTER — APPOINTMENT (OUTPATIENT)
Dept: SPEECH THERAPY | Facility: CLINIC | Age: 86
DRG: 177 | End: 2021-01-01
Payer: COMMERCIAL

## 2021-01-01 ENCOUNTER — TRANSFERRED RECORDS (OUTPATIENT)
Dept: HEALTH INFORMATION MANAGEMENT | Facility: CLINIC | Age: 86
End: 2021-01-01

## 2021-01-01 ENCOUNTER — APPOINTMENT (OUTPATIENT)
Dept: PHYSICAL THERAPY | Facility: CLINIC | Age: 86
DRG: 481 | End: 2021-01-01
Attending: PHYSICIAN ASSISTANT
Payer: COMMERCIAL

## 2021-01-01 ENCOUNTER — APPOINTMENT (OUTPATIENT)
Dept: PHYSICAL THERAPY | Facility: CLINIC | Age: 86
DRG: 177 | End: 2021-01-01
Payer: COMMERCIAL

## 2021-01-01 ENCOUNTER — APPOINTMENT (OUTPATIENT)
Dept: PHYSICAL THERAPY | Facility: CLINIC | Age: 86
DRG: 177 | End: 2021-01-01
Attending: STUDENT IN AN ORGANIZED HEALTH CARE EDUCATION/TRAINING PROGRAM
Payer: COMMERCIAL

## 2021-01-01 ENCOUNTER — APPOINTMENT (OUTPATIENT)
Dept: SPEECH THERAPY | Facility: CLINIC | Age: 86
DRG: 481 | End: 2021-01-01
Payer: COMMERCIAL

## 2021-01-01 ENCOUNTER — APPOINTMENT (OUTPATIENT)
Dept: GENERAL RADIOLOGY | Facility: CLINIC | Age: 86
DRG: 481 | End: 2021-01-01
Attending: INTERNAL MEDICINE
Payer: COMMERCIAL

## 2021-01-01 ENCOUNTER — CLINICAL UPDATE (OUTPATIENT)
Dept: PHARMACY | Facility: CLINIC | Age: 86
End: 2021-01-01
Payer: COMMERCIAL

## 2021-01-01 ENCOUNTER — DOCUMENTATION ONLY (OUTPATIENT)
Dept: OTHER | Facility: CLINIC | Age: 86
End: 2021-01-01

## 2021-01-01 ENCOUNTER — ANESTHESIA (OUTPATIENT)
Dept: SURGERY | Facility: CLINIC | Age: 86
DRG: 481 | End: 2021-01-01
Payer: COMMERCIAL

## 2021-01-01 ENCOUNTER — APPOINTMENT (OUTPATIENT)
Dept: SPEECH THERAPY | Facility: CLINIC | Age: 86
DRG: 177 | End: 2021-01-01
Attending: STUDENT IN AN ORGANIZED HEALTH CARE EDUCATION/TRAINING PROGRAM
Payer: COMMERCIAL

## 2021-01-01 VITALS
DIASTOLIC BLOOD PRESSURE: 66 MMHG | WEIGHT: 102.73 LBS | SYSTOLIC BLOOD PRESSURE: 141 MMHG | RESPIRATION RATE: 18 BRPM | TEMPERATURE: 98.4 F | BODY MASS INDEX: 15.62 KG/M2 | OXYGEN SATURATION: 100 % | HEART RATE: 76 BPM

## 2021-01-01 VITALS
DIASTOLIC BLOOD PRESSURE: 62 MMHG | TEMPERATURE: 97.3 F | SYSTOLIC BLOOD PRESSURE: 144 MMHG | RESPIRATION RATE: 18 BRPM | WEIGHT: 95.2 LBS | SYSTOLIC BLOOD PRESSURE: 128 MMHG | RESPIRATION RATE: 18 BRPM | BODY MASS INDEX: 14.7 KG/M2 | WEIGHT: 97 LBS | OXYGEN SATURATION: 97 % | DIASTOLIC BLOOD PRESSURE: 70 MMHG | BODY MASS INDEX: 14.43 KG/M2 | HEART RATE: 66 BPM | TEMPERATURE: 98.1 F | HEART RATE: 64 BPM | HEIGHT: 68 IN | OXYGEN SATURATION: 94 % | HEIGHT: 68 IN

## 2021-01-01 VITALS
DIASTOLIC BLOOD PRESSURE: 49 MMHG | RESPIRATION RATE: 18 BRPM | WEIGHT: 100 LBS | HEART RATE: 74 BPM | TEMPERATURE: 97.4 F | HEIGHT: 68 IN | SYSTOLIC BLOOD PRESSURE: 140 MMHG | OXYGEN SATURATION: 92 % | BODY MASS INDEX: 15.16 KG/M2

## 2021-01-01 VITALS
RESPIRATION RATE: 18 BRPM | HEART RATE: 66 BPM | TEMPERATURE: 97.5 F | DIASTOLIC BLOOD PRESSURE: 62 MMHG | SYSTOLIC BLOOD PRESSURE: 128 MMHG | OXYGEN SATURATION: 96 %

## 2021-01-01 VITALS
SYSTOLIC BLOOD PRESSURE: 152 MMHG | WEIGHT: 113.31 LBS | DIASTOLIC BLOOD PRESSURE: 59 MMHG | HEART RATE: 59 BPM | OXYGEN SATURATION: 97 % | BODY MASS INDEX: 17.23 KG/M2 | TEMPERATURE: 97.2 F | RESPIRATION RATE: 16 BRPM

## 2021-01-01 VITALS
TEMPERATURE: 97.4 F | BODY MASS INDEX: 15.16 KG/M2 | HEART RATE: 71 BPM | WEIGHT: 100 LBS | RESPIRATION RATE: 18 BRPM | SYSTOLIC BLOOD PRESSURE: 157 MMHG | HEIGHT: 68 IN | DIASTOLIC BLOOD PRESSURE: 70 MMHG | OXYGEN SATURATION: 93 %

## 2021-01-01 VITALS
DIASTOLIC BLOOD PRESSURE: 73 MMHG | RESPIRATION RATE: 18 BRPM | BODY MASS INDEX: 13.97 KG/M2 | WEIGHT: 92.2 LBS | OXYGEN SATURATION: 96 % | TEMPERATURE: 97.4 F | HEIGHT: 68 IN | HEART RATE: 60 BPM | SYSTOLIC BLOOD PRESSURE: 155 MMHG

## 2021-01-01 VITALS
RESPIRATION RATE: 18 BRPM | OXYGEN SATURATION: 96 % | HEIGHT: 68 IN | DIASTOLIC BLOOD PRESSURE: 68 MMHG | TEMPERATURE: 96.6 F | HEART RATE: 62 BPM | SYSTOLIC BLOOD PRESSURE: 136 MMHG | BODY MASS INDEX: 13.97 KG/M2 | WEIGHT: 92.2 LBS

## 2021-01-01 DIAGNOSIS — E55.9 VITAMIN D DEFICIENCY: Primary | ICD-10-CM

## 2021-01-01 DIAGNOSIS — R13.10 DYSPHAGIA, UNSPECIFIED TYPE: ICD-10-CM

## 2021-01-01 DIAGNOSIS — H35.30 MACULAR DEGENERATION (SENILE) OF RETINA: ICD-10-CM

## 2021-01-01 DIAGNOSIS — Z98.890 POSTOPERATIVE STATE: ICD-10-CM

## 2021-01-01 DIAGNOSIS — N18.31 STAGE 3A CHRONIC KIDNEY DISEASE (H): ICD-10-CM

## 2021-01-01 DIAGNOSIS — M79.652 PAIN OF LEFT THIGH: ICD-10-CM
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
## 2021-01-01 DIAGNOSIS — S72.145D CLOSED NONDISPLACED INTERTROCHANTERIC FRACTURE OF LEFT FEMUR WITH ROUTINE HEALING, SUBSEQUENT ENCOUNTER: ICD-10-CM

## 2021-01-01 DIAGNOSIS — E78.5 HYPERLIPIDEMIA, UNSPECIFIED HYPERLIPIDEMIA TYPE: ICD-10-CM

## 2021-01-01 DIAGNOSIS — F03.90 DEMENTIA WITHOUT BEHAVIORAL DISTURBANCE, UNSPECIFIED DEMENTIA TYPE: ICD-10-CM

## 2021-01-01 DIAGNOSIS — R63.4 WEIGHT LOSS: ICD-10-CM

## 2021-01-01 DIAGNOSIS — M15.9 OSTEOARTHRITIS OF MULTIPLE JOINTS, UNSPECIFIED OSTEOARTHRITIS TYPE: ICD-10-CM

## 2021-01-01 DIAGNOSIS — M19.90 ARTHRITIS: ICD-10-CM

## 2021-01-01 DIAGNOSIS — E63.9 NUTRITIONAL DEFICIENCY: ICD-10-CM

## 2021-01-01 DIAGNOSIS — Z51.5 HOSPICE CARE PATIENT: ICD-10-CM

## 2021-01-01 DIAGNOSIS — D62 ANEMIA ASSOCIATED WITH ACUTE BLOOD LOSS: ICD-10-CM

## 2021-01-01 DIAGNOSIS — I63.9 CEREBROVASCULAR ACCIDENT (CVA), UNSPECIFIED MECHANISM (H): ICD-10-CM

## 2021-01-01 DIAGNOSIS — I10 ESSENTIAL HYPERTENSION: ICD-10-CM

## 2021-01-01 DIAGNOSIS — S72.002A CLOSED FRACTURE OF LEFT HIP, INITIAL ENCOUNTER (H): ICD-10-CM

## 2021-01-01 DIAGNOSIS — Z86.73 HISTORY OF CVA (CEREBROVASCULAR ACCIDENT): ICD-10-CM

## 2021-01-01 DIAGNOSIS — F03.90 DEMENTIA WITHOUT BEHAVIORAL DISTURBANCE, UNSPECIFIED DEMENTIA TYPE: Primary | ICD-10-CM

## 2021-01-01 DIAGNOSIS — J69.0 ASPIRATION PNEUMONIA, UNSPECIFIED ASPIRATION PNEUMONIA TYPE, UNSPECIFIED LATERALITY, UNSPECIFIED PART OF LUNG (H): Primary | ICD-10-CM

## 2021-01-01 DIAGNOSIS — J96.01 ACUTE RESPIRATORY FAILURE WITH HYPOXIA (H): ICD-10-CM

## 2021-01-01 DIAGNOSIS — H01.003 BLEPHARITIS OF BOTH EYES, UNSPECIFIED EYELID, UNSPECIFIED TYPE: Primary | ICD-10-CM

## 2021-01-01 DIAGNOSIS — H01.006 BLEPHARITIS OF BOTH EYES, UNSPECIFIED EYELID, UNSPECIFIED TYPE: Primary | ICD-10-CM

## 2021-01-01 DIAGNOSIS — R53.81 PHYSICAL DECONDITIONING: ICD-10-CM

## 2021-01-01 DIAGNOSIS — H01.00A BLEPHARITIS OF UPPER AND LOWER EYELIDS OF BOTH EYES, UNSPECIFIED TYPE: Primary | ICD-10-CM

## 2021-01-01 DIAGNOSIS — I10 BENIGN ESSENTIAL HYPERTENSION: Primary | ICD-10-CM

## 2021-01-01 DIAGNOSIS — N18.31 STAGE 3A CHRONIC KIDNEY DISEASE (H): Primary | ICD-10-CM

## 2021-01-01 DIAGNOSIS — E55.9 VITAMIN D DEFICIENCY: ICD-10-CM

## 2021-01-01 DIAGNOSIS — H01.00B BLEPHARITIS OF UPPER AND LOWER EYELIDS OF BOTH EYES, UNSPECIFIED TYPE: Primary | ICD-10-CM

## 2021-01-01 DIAGNOSIS — Z91.81 PERSONAL HISTORY OF FALL: ICD-10-CM

## 2021-01-01 DIAGNOSIS — R05.9 COUGH: ICD-10-CM

## 2021-01-01 DIAGNOSIS — I67.9 CEREBRAL VASCULAR DISEASE: Primary | ICD-10-CM

## 2021-01-01 DIAGNOSIS — D62 ANEMIA DUE TO BLOOD LOSS, ACUTE: ICD-10-CM

## 2021-01-01 LAB
25(OH)D3 SERPL-MCNC: 34.7 NG/ML (ref 30–80)
ABO + RH BLD: NORMAL
ABO + RH BLD: NORMAL
ALBUMIN SERPL-MCNC: 2.6 G/DL (ref 3.4–5)
ALBUMIN UR-MCNC: 30 MG/DL
ALBUMIN UR-MCNC: NEGATIVE MG/DL
ALP SERPL-CCNC: 104 U/L (ref 40–150)
ALT SERPL W P-5'-P-CCNC: 26 U/L (ref 0–50)
ANION GAP SERPL CALCULATED.3IONS-SCNC: 14 MMOL/L (ref 5–18)
ANION GAP SERPL CALCULATED.3IONS-SCNC: 14 MMOL/L (ref 5–18)
ANION GAP SERPL CALCULATED.3IONS-SCNC: 2 MMOL/L (ref 3–14)
ANION GAP SERPL CALCULATED.3IONS-SCNC: 2 MMOL/L (ref 3–14)
ANION GAP SERPL CALCULATED.3IONS-SCNC: 3 MMOL/L (ref 3–14)
ANION GAP SERPL CALCULATED.3IONS-SCNC: 4 MMOL/L (ref 3–14)
ANION GAP SERPL CALCULATED.3IONS-SCNC: 5 MMOL/L (ref 3–14)
APPEARANCE UR: ABNORMAL
APPEARANCE UR: CLEAR
AST SERPL W P-5'-P-CCNC: 35 U/L (ref 0–45)
BACTERIA SPEC CULT: NO GROWTH
BACTERIA SPEC CULT: NO GROWTH
BASOPHILS # BLD AUTO: 0.1 10E9/L (ref 0–0.2)
BASOPHILS # BLD AUTO: 0.1 10E9/L (ref 0–0.2)
BASOPHILS # BLD AUTO: 0.1 THOU/UL (ref 0–0.2)
BASOPHILS NFR BLD AUTO: 0.4 %
BASOPHILS NFR BLD AUTO: 0.5 %
BASOPHILS NFR BLD AUTO: 1 % (ref 0–2)
BILIRUB SERPL-MCNC: 0.7 MG/DL (ref 0.2–1.3)
BILIRUB UR QL STRIP: NEGATIVE
BILIRUB UR QL STRIP: NEGATIVE
BLD GP AB SCN SERPL QL: NORMAL
BLD PROD TYP BPU: NORMAL
BLD PROD TYP BPU: NORMAL
BLD UNIT ID BPU: 0
BLOOD BANK CMNT PATIENT-IMP: NORMAL
BLOOD PRODUCT CODE: NORMAL
BPU ID: NORMAL
BUN SERPL-MCNC: 23 MG/DL (ref 7–30)
BUN SERPL-MCNC: 23 MG/DL (ref 8–28)
BUN SERPL-MCNC: 23 MG/DL (ref 8–28)
BUN SERPL-MCNC: 25 MG/DL (ref 7–30)
BUN SERPL-MCNC: 28 MG/DL (ref 7–30)
BUN SERPL-MCNC: 31 MG/DL (ref 7–30)
BUN SERPL-MCNC: 31 MG/DL (ref 7–30)
BUN SERPL-MCNC: 44 MG/DL (ref 7–30)
BUN SERPL-MCNC: 48 MG/DL (ref 7–30)
BUN SERPL-MCNC: 52 MG/DL (ref 7–30)
BUN SERPL-MCNC: 57 MG/DL (ref 7–30)
CALCIUM SERPL-MCNC: 10.4 MG/DL (ref 8.5–10.1)
CALCIUM SERPL-MCNC: 8.6 MG/DL (ref 8.5–10.1)
CALCIUM SERPL-MCNC: 8.9 MG/DL (ref 8.5–10.1)
CALCIUM SERPL-MCNC: 9.1 MG/DL (ref 8.5–10.1)
CALCIUM SERPL-MCNC: 9.2 MG/DL (ref 8.5–10.1)
CALCIUM SERPL-MCNC: 9.5 MG/DL (ref 8.5–10.1)
CALCIUM SERPL-MCNC: 9.6 MG/DL (ref 8.5–10.1)
CALCIUM SERPL-MCNC: 9.7 MG/DL (ref 8.5–10.1)
CALCIUM SERPL-MCNC: 9.8 MG/DL (ref 8.5–10.5)
CALCIUM SERPL-MCNC: 9.8 MG/DL (ref 8.5–10.5)
CALCIUM SERPL-MCNC: 9.9 MG/DL (ref 8.5–10.1)
CHLORIDE BLD-SCNC: 105 MMOL/L (ref 98–107)
CHLORIDE SERPL-SCNC: 108 MMOL/L (ref 94–109)
CHLORIDE SERPL-SCNC: 108 MMOL/L (ref 94–109)
CHLORIDE SERPL-SCNC: 109 MMOL/L (ref 94–109)
CHLORIDE SERPL-SCNC: 110 MMOL/L (ref 94–109)
CHLORIDE SERPL-SCNC: 112 MMOL/L (ref 94–109)
CHLORIDE SERPL-SCNC: 114 MMOL/L (ref 94–109)
CHLORIDE SERPL-SCNC: 117 MMOL/L (ref 94–109)
CHLORIDE SERPL-SCNC: 119 MMOL/L (ref 94–109)
CHLORIDE SERPL-SCNC: 120 MMOL/L (ref 94–109)
CHLORIDE SERPLBLD-SCNC: 105 MMOL/L (ref 98–107)
CO2 BLDCOV-SCNC: 32 MMOL/L (ref 21–28)
CO2 SERPL-SCNC: 23 MMOL/L (ref 22–31)
CO2 SERPL-SCNC: 23 MMOL/L (ref 22–31)
CO2 SERPL-SCNC: 24 MMOL/L (ref 20–32)
CO2 SERPL-SCNC: 25 MMOL/L (ref 20–32)
CO2 SERPL-SCNC: 25 MMOL/L (ref 20–32)
CO2 SERPL-SCNC: 26 MMOL/L (ref 20–32)
CO2 SERPL-SCNC: 27 MMOL/L (ref 20–32)
CO2 SERPL-SCNC: 30 MMOL/L (ref 20–32)
COLOR UR AUTO: ABNORMAL
COLOR UR AUTO: YELLOW
CREAT SERPL-MCNC: 0.89 MG/DL (ref 0.52–1.04)
CREAT SERPL-MCNC: 1.02 MG/DL (ref 0.52–1.04)
CREAT SERPL-MCNC: 1.04 MG/DL (ref 0.52–1.04)
CREAT SERPL-MCNC: 1.11 MG/DL (ref 0.52–1.04)
CREAT SERPL-MCNC: 1.14 MG/DL (ref 0.52–1.04)
CREAT SERPL-MCNC: 1.17 MG/DL (ref 0.52–1.04)
CREAT SERPL-MCNC: 1.17 MG/DL (ref 0.52–1.04)
CREAT SERPL-MCNC: 1.19 MG/DL (ref 0.52–1.04)
CREAT SERPL-MCNC: 1.24 MG/DL (ref 0.52–1.04)
CREAT SERPL-MCNC: 1.28 MG/DL (ref 0.6–1.1)
DEPRECATED CALCIDIOL+CALCIFEROL SERPL-MC: <53 UG/L (ref 20–75)
DIFFERENTIAL METHOD BLD: ABNORMAL
DIFFERENTIAL METHOD BLD: ABNORMAL
DIFFERENTIAL: ABNORMAL
EOSINOPHIL # BLD AUTO: 0 10E9/L (ref 0–0.7)
EOSINOPHIL # BLD AUTO: 0.1 10E9/L (ref 0–0.7)
EOSINOPHIL # BLD AUTO: 0.2 THOU/UL (ref 0–0.4)
EOSINOPHIL NFR BLD AUTO: 0.2 %
EOSINOPHIL NFR BLD AUTO: 0.3 %
EOSINOPHIL NFR BLD AUTO: 2 % (ref 0–6)
ERYTHROCYTE [DISTWIDTH] IN BLOOD BY AUTOMATED COUNT: 13.5 % (ref 10–15)
ERYTHROCYTE [DISTWIDTH] IN BLOOD BY AUTOMATED COUNT: 13.8 % (ref 10–15)
ERYTHROCYTE [DISTWIDTH] IN BLOOD BY AUTOMATED COUNT: 13.8 % (ref 10–15)
ERYTHROCYTE [DISTWIDTH] IN BLOOD BY AUTOMATED COUNT: 13.9 % (ref 10–15)
ERYTHROCYTE [DISTWIDTH] IN BLOOD BY AUTOMATED COUNT: 14.1 % (ref 11–14.5)
ERYTHROCYTE [DISTWIDTH] IN BLOOD BY AUTOMATED COUNT: 14.1 % (ref 11–14.5)
ERYTHROCYTE [DISTWIDTH] IN BLOOD BY AUTOMATED COUNT: 14.2 % (ref 10–15)
ERYTHROCYTE [DISTWIDTH] IN BLOOD BY AUTOMATED COUNT: 14.7 % (ref 10–15)
ERYTHROCYTE [DISTWIDTH] IN BLOOD BY AUTOMATED COUNT: 14.7 % (ref 10–15)
ERYTHROCYTE [DISTWIDTH] IN BLOOD BY AUTOMATED COUNT: 14.8 % (ref 10–15)
FERRITIN SERPL-MCNC: 732 NG/ML (ref 8–252)
FOLATE SERPL-MCNC: 29.8 NG/ML
GFR SERPL CREATININE-BSD FRML MDRD: 37 ML/MIN/{1.73_M2}
GFR SERPL CREATININE-BSD FRML MDRD: 39 ML/MIN/1.73M2
GFR SERPL CREATININE-BSD FRML MDRD: 39 ML/MIN/{1.73_M2}
GFR SERPL CREATININE-BSD FRML MDRD: 40 ML/MIN/{1.73_M2}
GFR SERPL CREATININE-BSD FRML MDRD: 40 ML/MIN/{1.73_M2}
GFR SERPL CREATININE-BSD FRML MDRD: 41 ML/MIN/{1.73_M2}
GFR SERPL CREATININE-BSD FRML MDRD: 43 ML/MIN/{1.73_M2}
GFR SERPL CREATININE-BSD FRML MDRD: 46 ML/MIN/{1.73_M2}
GFR SERPL CREATININE-BSD FRML MDRD: 47 ML/MIN/{1.73_M2}
GFR SERPL CREATININE-BSD FRML MDRD: 56 ML/MIN/{1.73_M2}
GLUCOSE BLD-MCNC: 127 MG/DL (ref 70–125)
GLUCOSE BLDC GLUCOMTR-MCNC: 106 MG/DL (ref 70–99)
GLUCOSE BLDC GLUCOMTR-MCNC: 127 MG/DL (ref 70–99)
GLUCOSE BLDC GLUCOMTR-MCNC: 129 MG/DL (ref 70–99)
GLUCOSE BLDC GLUCOMTR-MCNC: 130 MG/DL (ref 70–99)
GLUCOSE BLDC GLUCOMTR-MCNC: 139 MG/DL (ref 70–99)
GLUCOSE BLDC GLUCOMTR-MCNC: 141 MG/DL (ref 70–99)
GLUCOSE BLDC GLUCOMTR-MCNC: 143 MG/DL (ref 70–99)
GLUCOSE BLDC GLUCOMTR-MCNC: 89 MG/DL (ref 70–99)
GLUCOSE BLDC GLUCOMTR-MCNC: 98 MG/DL (ref 70–99)
GLUCOSE SERPL-MCNC: 108 MG/DL (ref 70–99)
GLUCOSE SERPL-MCNC: 114 MG/DL (ref 70–99)
GLUCOSE SERPL-MCNC: 116 MG/DL (ref 70–99)
GLUCOSE SERPL-MCNC: 124 MG/DL (ref 70–99)
GLUCOSE SERPL-MCNC: 127 MG/DL (ref 70–125)
GLUCOSE SERPL-MCNC: 127 MG/DL (ref 70–125)
GLUCOSE SERPL-MCNC: 132 MG/DL (ref 70–99)
GLUCOSE SERPL-MCNC: 133 MG/DL (ref 70–99)
GLUCOSE SERPL-MCNC: 139 MG/DL (ref 70–99)
GLUCOSE SERPL-MCNC: 147 MG/DL (ref 70–99)
GLUCOSE SERPL-MCNC: 93 MG/DL (ref 70–99)
GLUCOSE UR STRIP-MCNC: NEGATIVE MG/DL
GLUCOSE UR STRIP-MCNC: NEGATIVE MG/DL
HCT VFR BLD AUTO: 22.3 % (ref 35–47)
HCT VFR BLD AUTO: 25.9 % (ref 35–47)
HCT VFR BLD AUTO: 26.2 % (ref 35–47)
HCT VFR BLD AUTO: 26.2 % (ref 35–47)
HCT VFR BLD AUTO: 26.4 % (ref 35–47)
HCT VFR BLD AUTO: 27.7 % (ref 35–47)
HCT VFR BLD AUTO: 33.9 % (ref 35–47)
HCT VFR BLD AUTO: 35.1 % (ref 35–47)
HCT VFR BLD AUTO: 38.5 % (ref 35–47)
HCT VFR BLD AUTO: 38.5 % (ref 35–47)
HEMOGLOBIN: 12.1 G/DL (ref 12–16)
HGB BLD-MCNC: 10.6 G/DL (ref 11.7–15.7)
HGB BLD-MCNC: 11 G/DL (ref 11.7–15.7)
HGB BLD-MCNC: 12.1 G/DL (ref 12–16)
HGB BLD-MCNC: 7.1 G/DL (ref 11.7–15.7)
HGB BLD-MCNC: 8.1 G/DL (ref 11.7–15.7)
HGB BLD-MCNC: 8.2 G/DL (ref 11.7–15.7)
HGB BLD-MCNC: 8.2 G/DL (ref 11.7–15.7)
HGB BLD-MCNC: 8.4 G/DL (ref 11.7–15.7)
HGB BLD-MCNC: 8.4 G/DL (ref 11.7–15.7)
HGB BLD-MCNC: 8.8 G/DL (ref 11.7–15.7)
HGB UR QL STRIP: NEGATIVE
HGB UR QL STRIP: NEGATIVE
HYALINE CASTS #/AREA URNS LPF: 1 /LPF (ref 0–2)
IMM GRANULOCYTES # BLD: 0 10E9/L (ref 0–0.4)
IMM GRANULOCYTES # BLD: 0 THOU/UL
IMM GRANULOCYTES # BLD: 0.4 10E9/L (ref 0–0.4)
IMM GRANULOCYTES NFR BLD: 0 %
IMM GRANULOCYTES NFR BLD: 0.3 %
IMM GRANULOCYTES NFR BLD: 2 %
INR PPP: 0.99 (ref 0.86–1.14)
INTERPRETATION ECG - MUSE: NORMAL
INTERPRETATION ECG - MUSE: NORMAL
IRON SATN MFR SERPL: 14 % (ref 15–46)
IRON SATN MFR SERPL: 4 % (ref 15–46)
IRON SERPL-MCNC: 22 UG/DL (ref 35–180)
IRON SERPL-MCNC: 6 UG/DL (ref 35–180)
KETONES UR STRIP-MCNC: NEGATIVE MG/DL
KETONES UR STRIP-MCNC: NEGATIVE MG/DL
LABORATORY COMMENT REPORT: NORMAL
LABORATORY COMMENT REPORT: NORMAL
LACTATE BLD-SCNC: 1.3 MMOL/L (ref 0.7–2)
LACTATE BLD-SCNC: 1.4 MMOL/L (ref 0.7–2.1)
LEUKOCYTE ESTERASE UR QL STRIP: NEGATIVE
LEUKOCYTE ESTERASE UR QL STRIP: NEGATIVE
LYMPHOCYTES # BLD AUTO: 0.8 10E9/L (ref 0.8–5.3)
LYMPHOCYTES # BLD AUTO: 0.9 10E9/L (ref 0.8–5.3)
LYMPHOCYTES # BLD AUTO: 1.2 THOU/UL (ref 0.8–4.4)
LYMPHOCYTES NFR BLD AUTO: 18 % (ref 20–40)
LYMPHOCYTES NFR BLD AUTO: 4.8 %
LYMPHOCYTES NFR BLD AUTO: 6.6 %
MAGNESIUM SERPL-MCNC: 2 MG/DL (ref 1.8–2.6)
MCH RBC QN AUTO: 29.8 PG (ref 26.5–33)
MCH RBC QN AUTO: 30 PG (ref 26.5–33)
MCH RBC QN AUTO: 30.1 PG (ref 26.5–33)
MCH RBC QN AUTO: 30.2 PG (ref 27–34)
MCH RBC QN AUTO: 30.2 PG (ref 27–34)
MCH RBC QN AUTO: 30.5 PG (ref 26.5–33)
MCH RBC QN AUTO: 30.6 PG (ref 26.5–33)
MCH RBC QN AUTO: 30.9 PG (ref 26.5–33)
MCH RBC QN AUTO: 31.2 PG (ref 26.5–33)
MCH RBC QN AUTO: 31.3 PG (ref 26.5–33)
MCHC RBC AUTO-ENTMCNC: 30.7 G/DL (ref 31.5–36.5)
MCHC RBC AUTO-ENTMCNC: 31.3 G/DL (ref 31.5–36.5)
MCHC RBC AUTO-ENTMCNC: 31.4 G/DL (ref 32–36)
MCHC RBC AUTO-ENTMCNC: 31.4 G/DL (ref 32–36)
MCHC RBC AUTO-ENTMCNC: 31.8 G/DL (ref 31.5–36.5)
MCHC RBC AUTO-ENTMCNC: 31.8 G/DL (ref 31.5–36.5)
MCHC RBC AUTO-ENTMCNC: 32.4 G/DL (ref 31.5–36.5)
MCV RBC AUTO: 100 FL (ref 78–100)
MCV RBC AUTO: 100 FL (ref 78–100)
MCV RBC AUTO: 95 FL (ref 78–100)
MCV RBC AUTO: 96 FL (ref 78–100)
MCV RBC AUTO: 96 FL (ref 80–100)
MCV RBC AUTO: 96 FL (ref 80–100)
MCV RBC AUTO: 97 FL (ref 78–100)
MCV RBC AUTO: 98 FL (ref 78–100)
MONOCYTES # BLD AUTO: 0.4 THOU/UL (ref 0–0.9)
MONOCYTES # BLD AUTO: 0.7 10E9/L (ref 0–1.3)
MONOCYTES # BLD AUTO: 1.4 10E9/L (ref 0–1.3)
MONOCYTES NFR BLD AUTO: 5.8 %
MONOCYTES NFR BLD AUTO: 6 % (ref 2–10)
MONOCYTES NFR BLD AUTO: 6.9 %
MUCOUS THREADS #/AREA URNS LPF: PRESENT /LPF
NEUTROPHILS # BLD AUTO: 10.7 10E9/L (ref 1.6–8.3)
NEUTROPHILS # BLD AUTO: 16.8 10E9/L (ref 1.6–8.3)
NEUTROPHILS # BLD AUTO: 5.2 THOU/UL (ref 2–7.7)
NEUTROPHILS NFR BLD AUTO: 73 % (ref 50–70)
NEUTROPHILS NFR BLD AUTO: 85.6 %
NEUTROPHILS NFR BLD AUTO: 86.6 %
NITRATE UR QL: NEGATIVE
NITRATE UR QL: NEGATIVE
NRBC # BLD AUTO: 0 10*3/UL
NRBC # BLD AUTO: 0 10*3/UL
NRBC BLD AUTO-RTO: 0 /100
NRBC BLD AUTO-RTO: 0 /100
NT-PROBNP SERPL-MCNC: 1169 PG/ML (ref 0–1800)
NUM BPU REQUESTED: 1
PCO2 BLDV: 67 MM HG (ref 40–50)
PH BLDV: 7.29 PH (ref 7.32–7.43)
PH UR STRIP: 6.5 PH (ref 5–7)
PH UR STRIP: 8 PH (ref 5–7)
PLATELET # BLD AUTO: 166 10E9/L (ref 150–450)
PLATELET # BLD AUTO: 193 10E9/L (ref 150–450)
PLATELET # BLD AUTO: 200 10E9/L (ref 150–450)
PLATELET # BLD AUTO: 236 10E9/L (ref 150–450)
PLATELET # BLD AUTO: 317 THOU/UL (ref 140–440)
PLATELET # BLD AUTO: 317 THOU/UL (ref 140–440)
PLATELET # BLD AUTO: 410 10E9/L (ref 150–450)
PLATELET # BLD AUTO: 425 10E9/L (ref 150–450)
PLATELET # BLD AUTO: 431 10E9/L (ref 150–450)
PLATELET # BLD AUTO: 537 10E9/L (ref 150–450)
PMV BLD AUTO: 9.4 FL (ref 8.5–12.5)
PO2 BLDV: 18 MM HG (ref 25–47)
POTASSIUM BLD-SCNC: 4.6 MMOL/L (ref 3.5–5)
POTASSIUM SERPL-SCNC: 3.7 MMOL/L (ref 3.4–5.3)
POTASSIUM SERPL-SCNC: 3.8 MMOL/L (ref 3.4–5.3)
POTASSIUM SERPL-SCNC: 4.1 MMOL/L (ref 3.4–5.3)
POTASSIUM SERPL-SCNC: 4.2 MMOL/L (ref 3.4–5.3)
POTASSIUM SERPL-SCNC: 4.3 MMOL/L (ref 3.4–5.3)
POTASSIUM SERPL-SCNC: 4.5 MMOL/L (ref 3.4–5.3)
POTASSIUM SERPL-SCNC: 4.6 MMOL/L (ref 3.4–5.3)
POTASSIUM SERPL-SCNC: 4.6 MMOL/L (ref 3.4–5.3)
POTASSIUM SERPL-SCNC: 4.6 MMOL/L (ref 3.5–5)
POTASSIUM SERPL-SCNC: 4.6 MMOL/L (ref 3.5–5)
POTASSIUM SERPL-SCNC: 4.7 MMOL/L (ref 3.4–5.3)
POTASSIUM SERPL-SCNC: 4.7 MMOL/L (ref 3.4–5.3)
PROCALCITONIN SERPL-MCNC: 0.32 NG/ML
PROT SERPL-MCNC: 7.4 G/DL (ref 6.8–8.8)
RBC # BLD AUTO: 2.36 10E12/L (ref 3.8–5.2)
RBC # BLD AUTO: 2.65 10E12/L (ref 3.8–5.2)
RBC # BLD AUTO: 2.69 10E12/L (ref 3.8–5.2)
RBC # BLD AUTO: 2.72 10E12/L (ref 3.8–5.2)
RBC # BLD AUTO: 2.73 10E12/L (ref 3.8–5.2)
RBC # BLD AUTO: 2.82 10E12/L (ref 3.8–5.2)
RBC # BLD AUTO: 3.51 10E12/L (ref 3.8–5.2)
RBC # BLD AUTO: 3.56 10E12/L (ref 3.8–5.2)
RBC # BLD AUTO: 4.01 MILL/UL (ref 3.8–5.4)
RBC # BLD AUTO: 4.01 MILL/UL (ref 3.8–5.4)
RBC #/AREA URNS AUTO: 0 /HPF (ref 0–2)
RBC #/AREA URNS AUTO: 2 /HPF (ref 0–2)
SAO2 % BLDV FROM PO2: 21 %
SARS-COV-2 RNA RESP QL NAA+PROBE: NEGATIVE
SARS-COV-2 RNA RESP QL NAA+PROBE: NEGATIVE
SODIUM SERPL-SCNC: 137 MMOL/L (ref 133–144)
SODIUM SERPL-SCNC: 139 MMOL/L (ref 133–144)
SODIUM SERPL-SCNC: 140 MMOL/L (ref 133–144)
SODIUM SERPL-SCNC: 140 MMOL/L (ref 133–144)
SODIUM SERPL-SCNC: 142 MMOL/L (ref 133–144)
SODIUM SERPL-SCNC: 142 MMOL/L (ref 136–145)
SODIUM SERPL-SCNC: 142 MMOL/L (ref 136–145)
SODIUM SERPL-SCNC: 144 MMOL/L (ref 133–144)
SODIUM SERPL-SCNC: 148 MMOL/L (ref 133–144)
SODIUM SERPL-SCNC: 148 MMOL/L (ref 133–144)
SODIUM SERPL-SCNC: 149 MMOL/L (ref 133–144)
SOURCE: ABNORMAL
SOURCE: ABNORMAL
SP GR UR STRIP: 1.02 (ref 1–1.03)
SP GR UR STRIP: 1.02 (ref 1–1.03)
SPECIMEN EXP DATE BLD: NORMAL
SPECIMEN SOURCE: NORMAL
SQUAMOUS #/AREA URNS AUTO: <1 /HPF (ref 0–1)
SQUAMOUS #/AREA URNS AUTO: <1 /HPF (ref 0–1)
TIBC SERPL-MCNC: 162 UG/DL (ref 240–430)
TIBC SERPL-MCNC: 164 UG/DL (ref 240–430)
TRANSFUSION STATUS PATIENT QL: NORMAL
TRANSFUSION STATUS PATIENT QL: NORMAL
TROPONIN I SERPL-MCNC: <0.015 UG/L (ref 0–0.04)
TROPONIN I SERPL-MCNC: <0.015 UG/L (ref 0–0.04)
UROBILINOGEN UR STRIP-MCNC: 0 MG/DL (ref 0–2)
UROBILINOGEN UR STRIP-MCNC: 0 MG/DL (ref 0–2)
VIT B12 SERPL-MCNC: 654 PG/ML (ref 193–986)
VITAMIN D2 SERPL-MCNC: <5 UG/L
VITAMIN D3 SERPL-MCNC: 48 UG/L
WBC # BLD AUTO: 11.6 10E9/L (ref 4–11)
WBC # BLD AUTO: 12.3 10E9/L (ref 4–11)
WBC # BLD AUTO: 12.3 10E9/L (ref 4–11)
WBC # BLD AUTO: 13.9 10E9/L (ref 4–11)
WBC # BLD AUTO: 16.7 10E9/L (ref 4–11)
WBC # BLD AUTO: 19.6 10E9/L (ref 4–11)
WBC # BLD AUTO: 7.1 THOU/UL (ref 4–11)
WBC # BLD AUTO: 8.7 10E9/L (ref 4–11)
WBC # BLD AUTO: 9 10E9/L (ref 4–11)
WBC #/AREA URNS AUTO: 4 /HPF (ref 0–5)
WBC #/AREA URNS AUTO: <1 /HPF (ref 0–5)
WBC: 7.1 THOU/UL (ref 4–11)

## 2021-01-01 PROCEDURE — 92610 EVALUATE SWALLOWING FUNCTION: CPT | Mod: GN

## 2021-01-01 PROCEDURE — 99232 SBSQ HOSP IP/OBS MODERATE 35: CPT | Performed by: INTERNAL MEDICINE

## 2021-01-01 PROCEDURE — 51702 INSERT TEMP BLADDER CATH: CPT

## 2021-01-01 PROCEDURE — 999N001017 HC STATISTIC GLUCOSE BY METER IP

## 2021-01-01 PROCEDURE — 99285 EMERGENCY DEPT VISIT HI MDM: CPT | Mod: 25

## 2021-01-01 PROCEDURE — 258N000003 HC RX IP 258 OP 636: Performed by: INTERNAL MEDICINE

## 2021-01-01 PROCEDURE — 36415 COLL VENOUS BLD VENIPUNCTURE: CPT | Performed by: INTERNAL MEDICINE

## 2021-01-01 PROCEDURE — 258N000003 HC RX IP 258 OP 636: Performed by: NURSE ANESTHETIST, CERTIFIED REGISTERED

## 2021-01-01 PROCEDURE — P9016 RBC LEUKOCYTES REDUCED: HCPCS | Performed by: PHYSICIAN ASSISTANT

## 2021-01-01 PROCEDURE — 92610 EVALUATE SWALLOWING FUNCTION: CPT | Mod: GN | Performed by: SPEECH-LANGUAGE PATHOLOGIST

## 2021-01-01 PROCEDURE — 258N000001 HC RX 258: Performed by: INTERNAL MEDICINE

## 2021-01-01 PROCEDURE — 83550 IRON BINDING TEST: CPT | Performed by: INTERNAL MEDICINE

## 2021-01-01 PROCEDURE — 73502 X-RAY EXAM HIP UNI 2-3 VIEWS: CPT

## 2021-01-01 PROCEDURE — 85018 HEMOGLOBIN: CPT | Performed by: PHYSICIAN ASSISTANT

## 2021-01-01 PROCEDURE — 250N000011 HC RX IP 250 OP 636: Performed by: STUDENT IN AN ORGANIZED HEALTH CARE EDUCATION/TRAINING PROGRAM

## 2021-01-01 PROCEDURE — 82746 ASSAY OF FOLIC ACID SERUM: CPT | Performed by: INTERNAL MEDICINE

## 2021-01-01 PROCEDURE — 36415 COLL VENOUS BLD VENIPUNCTURE: CPT | Performed by: STUDENT IN AN ORGANIZED HEALTH CARE EDUCATION/TRAINING PROGRAM

## 2021-01-01 PROCEDURE — 71045 X-RAY EXAM CHEST 1 VIEW: CPT

## 2021-01-01 PROCEDURE — 250N000011 HC RX IP 250 OP 636: Performed by: PHYSICIAN ASSISTANT

## 2021-01-01 PROCEDURE — 99233 SBSQ HOSP IP/OBS HIGH 50: CPT | Performed by: INTERNAL MEDICINE

## 2021-01-01 PROCEDURE — 250N000013 HC RX MED GY IP 250 OP 250 PS 637: Performed by: INTERNAL MEDICINE

## 2021-01-01 PROCEDURE — 85025 COMPLETE CBC W/AUTO DIFF WBC: CPT | Performed by: EMERGENCY MEDICINE

## 2021-01-01 PROCEDURE — 80048 BASIC METABOLIC PNL TOTAL CA: CPT | Performed by: EMERGENCY MEDICINE

## 2021-01-01 PROCEDURE — 85027 COMPLETE CBC AUTOMATED: CPT | Performed by: STUDENT IN AN ORGANIZED HEALTH CARE EDUCATION/TRAINING PROGRAM

## 2021-01-01 PROCEDURE — 250N000013 HC RX MED GY IP 250 OP 250 PS 637: Performed by: PHYSICIAN ASSISTANT

## 2021-01-01 PROCEDURE — 99233 SBSQ HOSP IP/OBS HIGH 50: CPT | Performed by: STUDENT IN AN ORGANIZED HEALTH CARE EDUCATION/TRAINING PROGRAM

## 2021-01-01 PROCEDURE — 85027 COMPLETE CBC AUTOMATED: CPT | Performed by: INTERNAL MEDICINE

## 2021-01-01 PROCEDURE — 99239 HOSP IP/OBS DSCHRG MGMT >30: CPT | Performed by: INTERNAL MEDICINE

## 2021-01-01 PROCEDURE — 370N000017 HC ANESTHESIA TECHNICAL FEE, PER MIN: Performed by: ORTHOPAEDIC SURGERY

## 2021-01-01 PROCEDURE — 81001 URINALYSIS AUTO W/SCOPE: CPT | Performed by: EMERGENCY MEDICINE

## 2021-01-01 PROCEDURE — 120N000001 HC R&B MED SURG/OB

## 2021-01-01 PROCEDURE — 83880 ASSAY OF NATRIURETIC PEPTIDE: CPT | Performed by: EMERGENCY MEDICINE

## 2021-01-01 PROCEDURE — 99207 PR NO CHARGE LOS: CPT | Performed by: PHARMACIST

## 2021-01-01 PROCEDURE — 250N000013 HC RX MED GY IP 250 OP 250 PS 637: Performed by: STUDENT IN AN ORGANIZED HEALTH CARE EDUCATION/TRAINING PROGRAM

## 2021-01-01 PROCEDURE — 85610 PROTHROMBIN TIME: CPT | Performed by: EMERGENCY MEDICINE

## 2021-01-01 PROCEDURE — 92526 ORAL FUNCTION THERAPY: CPT | Mod: GN

## 2021-01-01 PROCEDURE — 99232 SBSQ HOSP IP/OBS MODERATE 35: CPT | Performed by: NURSE PRACTITIONER

## 2021-01-01 PROCEDURE — 250N000011 HC RX IP 250 OP 636: Performed by: EMERGENCY MEDICINE

## 2021-01-01 PROCEDURE — 99307 SBSQ NF CARE SF MDM 10: CPT | Performed by: NURSE PRACTITIONER

## 2021-01-01 PROCEDURE — 99309 SBSQ NF CARE MODERATE MDM 30: CPT | Performed by: NURSE PRACTITIONER

## 2021-01-01 PROCEDURE — 250N000009 HC RX 250: Performed by: ANESTHESIOLOGY

## 2021-01-01 PROCEDURE — 84145 PROCALCITONIN (PCT): CPT | Performed by: EMERGENCY MEDICINE

## 2021-01-01 PROCEDURE — 86900 BLOOD TYPING SEROLOGIC ABO: CPT | Performed by: PHYSICIAN ASSISTANT

## 2021-01-01 PROCEDURE — 999N000157 HC STATISTIC RCP TIME EA 10 MIN

## 2021-01-01 PROCEDURE — 90471 IMMUNIZATION ADMIN: CPT | Performed by: EMERGENCY MEDICINE

## 2021-01-01 PROCEDURE — 93005 ELECTROCARDIOGRAM TRACING: CPT

## 2021-01-01 PROCEDURE — 250N000011 HC RX IP 250 OP 636: Performed by: NURSE ANESTHETIST, CERTIFIED REGISTERED

## 2021-01-01 PROCEDURE — 250N000009 HC RX 250: Performed by: NURSE ANESTHETIST, CERTIFIED REGISTERED

## 2021-01-01 PROCEDURE — 258N000003 HC RX IP 258 OP 636: Performed by: PHYSICIAN ASSISTANT

## 2021-01-01 PROCEDURE — 97161 PT EVAL LOW COMPLEX 20 MIN: CPT | Mod: GP | Performed by: PHYSICAL THERAPIST

## 2021-01-01 PROCEDURE — 250N000009 HC RX 250: Performed by: ORTHOPAEDIC SURGERY

## 2021-01-01 PROCEDURE — 73110 X-RAY EXAM OF WRIST: CPT | Mod: RT

## 2021-01-01 PROCEDURE — 86923 COMPATIBILITY TEST ELECTRIC: CPT | Performed by: PHYSICIAN ASSISTANT

## 2021-01-01 PROCEDURE — 99309 SBSQ NF CARE MODERATE MDM 30: CPT | Performed by: INTERNAL MEDICINE

## 2021-01-01 PROCEDURE — 83540 ASSAY OF IRON: CPT | Performed by: INTERNAL MEDICINE

## 2021-01-01 PROCEDURE — 120N000013 HC R&B IMCU

## 2021-01-01 PROCEDURE — 80048 BASIC METABOLIC PNL TOTAL CA: CPT | Performed by: INTERNAL MEDICINE

## 2021-01-01 PROCEDURE — 85018 HEMOGLOBIN: CPT | Performed by: INTERNAL MEDICINE

## 2021-01-01 PROCEDURE — 250N000011 HC RX IP 250 OP 636: Performed by: INTERNAL MEDICINE

## 2021-01-01 PROCEDURE — 87040 BLOOD CULTURE FOR BACTERIA: CPT | Mod: XS | Performed by: EMERGENCY MEDICINE

## 2021-01-01 PROCEDURE — 85027 COMPLETE CBC AUTOMATED: CPT | Performed by: PHYSICIAN ASSISTANT

## 2021-01-01 PROCEDURE — 99239 HOSP IP/OBS DSCHRG MGMT >30: CPT | Performed by: STUDENT IN AN ORGANIZED HEALTH CARE EDUCATION/TRAINING PROGRAM

## 2021-01-01 PROCEDURE — 90714 TD VACC NO PRESV 7 YRS+ IM: CPT | Performed by: EMERGENCY MEDICINE

## 2021-01-01 PROCEDURE — 80048 BASIC METABOLIC PNL TOTAL CA: CPT | Performed by: STUDENT IN AN ORGANIZED HEALTH CARE EDUCATION/TRAINING PROGRAM

## 2021-01-01 PROCEDURE — 99222 1ST HOSP IP/OBS MODERATE 55: CPT | Mod: AI | Performed by: INTERNAL MEDICINE

## 2021-01-01 PROCEDURE — 82306 VITAMIN D 25 HYDROXY: CPT | Performed by: EMERGENCY MEDICINE

## 2021-01-01 PROCEDURE — 80048 BASIC METABOLIC PNL TOTAL CA: CPT | Performed by: PHYSICIAN ASSISTANT

## 2021-01-01 PROCEDURE — 94660 CPAP INITIATION&MGMT: CPT

## 2021-01-01 PROCEDURE — 99291 CRITICAL CARE FIRST HOUR: CPT | Performed by: STUDENT IN AN ORGANIZED HEALTH CARE EDUCATION/TRAINING PROGRAM

## 2021-01-01 PROCEDURE — 710N000009 HC RECOVERY PHASE 1, LEVEL 1, PER MIN: Performed by: ORTHOPAEDIC SURGERY

## 2021-01-01 PROCEDURE — 97530 THERAPEUTIC ACTIVITIES: CPT | Mod: GP | Performed by: PHYSICAL THERAPIST

## 2021-01-01 PROCEDURE — 82607 VITAMIN B-12: CPT | Performed by: INTERNAL MEDICINE

## 2021-01-01 PROCEDURE — 360N000084 HC SURGERY LEVEL 4 W/ FLUORO, PER MIN: Performed by: ORTHOPAEDIC SURGERY

## 2021-01-01 PROCEDURE — 83605 ASSAY OF LACTIC ACID: CPT | Performed by: STUDENT IN AN ORGANIZED HEALTH CARE EDUCATION/TRAINING PROGRAM

## 2021-01-01 PROCEDURE — 84484 ASSAY OF TROPONIN QUANT: CPT | Performed by: EMERGENCY MEDICINE

## 2021-01-01 PROCEDURE — 92526 ORAL FUNCTION THERAPY: CPT | Mod: GN | Performed by: SPEECH-LANGUAGE PATHOLOGIST

## 2021-01-01 PROCEDURE — 99207 PR MOONLIGHTING INDICATOR: CPT | Performed by: INTERNAL MEDICINE

## 2021-01-01 PROCEDURE — 82803 BLOOD GASES ANY COMBINATION: CPT

## 2021-01-01 PROCEDURE — C1769 GUIDE WIRE: HCPCS | Performed by: ORTHOPAEDIC SURGERY

## 2021-01-01 PROCEDURE — 83605 ASSAY OF LACTIC ACID: CPT

## 2021-01-01 PROCEDURE — 250N000013 HC RX MED GY IP 250 OP 250 PS 637: Performed by: NURSE PRACTITIONER

## 2021-01-01 PROCEDURE — 97530 THERAPEUTIC ACTIVITIES: CPT | Mod: GP

## 2021-01-01 PROCEDURE — 0QS736Z REPOSITION LEFT UPPER FEMUR WITH INTRAMEDULLARY INTERNAL FIXATION DEVICE, PERCUTANEOUS APPROACH: ICD-10-PCS | Performed by: ORTHOPAEDIC SURGERY

## 2021-01-01 PROCEDURE — 86850 RBC ANTIBODY SCREEN: CPT | Performed by: PHYSICIAN ASSISTANT

## 2021-01-01 PROCEDURE — 86901 BLOOD TYPING SEROLOGIC RH(D): CPT | Performed by: PHYSICIAN ASSISTANT

## 2021-01-01 PROCEDURE — 250N000025 HC SEVOFLURANE, PER MIN: Performed by: ORTHOPAEDIC SURGERY

## 2021-01-01 PROCEDURE — C1713 ANCHOR/SCREW BN/BN,TIS/BN: HCPCS | Performed by: ORTHOPAEDIC SURGERY

## 2021-01-01 PROCEDURE — 84132 ASSAY OF SERUM POTASSIUM: CPT | Performed by: INTERNAL MEDICINE

## 2021-01-01 PROCEDURE — 99308 SBSQ NF CARE LOW MDM 20: CPT | Performed by: NURSE PRACTITIONER

## 2021-01-01 PROCEDURE — 258N000003 HC RX IP 258 OP 636: Performed by: ANESTHESIOLOGY

## 2021-01-01 PROCEDURE — 82728 ASSAY OF FERRITIN: CPT | Performed by: INTERNAL MEDICINE

## 2021-01-01 PROCEDURE — 5A09357 ASSISTANCE WITH RESPIRATORY VENTILATION, LESS THAN 24 CONSECUTIVE HOURS, CONTINUOUS POSITIVE AIRWAY PRESSURE: ICD-10-PCS | Performed by: STUDENT IN AN ORGANIZED HEALTH CARE EDUCATION/TRAINING PROGRAM

## 2021-01-01 PROCEDURE — 87635 SARS-COV-2 COVID-19 AMP PRB: CPT | Performed by: EMERGENCY MEDICINE

## 2021-01-01 PROCEDURE — C9803 HOPD COVID-19 SPEC COLLECT: HCPCS

## 2021-01-01 PROCEDURE — 96365 THER/PROPH/DIAG IV INF INIT: CPT

## 2021-01-01 PROCEDURE — 97162 PT EVAL MOD COMPLEX 30 MIN: CPT | Mod: GP

## 2021-01-01 PROCEDURE — 999N000141 HC STATISTIC PRE-PROCEDURE NURSING ASSESSMENT: Performed by: ORTHOPAEDIC SURGERY

## 2021-01-01 PROCEDURE — 80053 COMPREHEN METABOLIC PANEL: CPT | Performed by: EMERGENCY MEDICINE

## 2021-01-01 PROCEDURE — 272N000001 HC OR GENERAL SUPPLY STERILE: Performed by: ORTHOPAEDIC SURGERY

## 2021-01-01 PROCEDURE — 99207 PR CDG-CODE CATEGORY CHANGED: CPT | Performed by: NURSE PRACTITIONER

## 2021-01-01 PROCEDURE — 96375 TX/PRO/DX INJ NEW DRUG ADDON: CPT

## 2021-01-01 PROCEDURE — 999N000179 XR SURGERY CARM FLUORO LESS THAN 5 MIN W STILLS

## 2021-01-01 PROCEDURE — 36415 COLL VENOUS BLD VENIPUNCTURE: CPT | Performed by: PHYSICIAN ASSISTANT

## 2021-01-01 PROCEDURE — 70450 CT HEAD/BRAIN W/O DYE: CPT

## 2021-01-01 DEVICE — IMP NAIL FEMORAL SYN TFN 10X170MM 125D 04.037.012S: Type: IMPLANTABLE DEVICE | Site: HIP | Status: FUNCTIONAL

## 2021-01-01 DEVICE — IMP SCR SYN 5.0 TI LOCK T25 STARDRIVE 32MM 04.005.522S: Type: IMPLANTABLE DEVICE | Site: HIP | Status: FUNCTIONAL

## 2021-01-01 DEVICE — IMP SCR SYN TFNA FENESTRATED LAG 85MM 04.038.185S: Type: IMPLANTABLE DEVICE | Site: HIP | Status: FUNCTIONAL

## 2021-01-01 RX ORDER — LEVOFLOXACIN 500 MG/1
500 TABLET, FILM COATED ORAL DAILY
Qty: 7 TABLET | Refills: 0 | Status: SHIPPED | OUTPATIENT
Start: 2021-01-01 | End: 2021-06-23

## 2021-01-01 RX ORDER — ONDANSETRON 4 MG/1
4 TABLET, ORALLY DISINTEGRATING ORAL EVERY 6 HOURS PRN
Status: DISCONTINUED | OUTPATIENT
Start: 2021-01-01 | End: 2021-01-01 | Stop reason: HOSPADM

## 2021-01-01 RX ORDER — ACETAMINOPHEN 325 MG/1
650 TABLET ORAL EVERY 4 HOURS PRN
Status: DISCONTINUED | OUTPATIENT
Start: 2021-01-01 | End: 2021-01-01

## 2021-01-01 RX ORDER — TRANEXAMIC ACID 10 MG/ML
1 INJECTION, SOLUTION INTRAVENOUS ONCE
Status: DISCONTINUED | OUTPATIENT
Start: 2021-01-01 | End: 2021-01-01 | Stop reason: HOSPADM

## 2021-01-01 RX ORDER — CARBOXYMETHYLCELLULOSE SODIUM 5 MG/ML
1 SOLUTION/ DROPS OPHTHALMIC
Status: DISCONTINUED | OUTPATIENT
Start: 2021-01-01 | End: 2021-01-01

## 2021-01-01 RX ORDER — ACETAMINOPHEN 650 MG/1
650 SUPPOSITORY RECTAL EVERY 4 HOURS PRN
Status: DISCONTINUED | OUTPATIENT
Start: 2021-01-01 | End: 2021-01-01 | Stop reason: HOSPADM

## 2021-01-01 RX ORDER — ACETAMINOPHEN 325 MG/1
650 TABLET ORAL 4 TIMES DAILY PRN
Status: DISCONTINUED | OUTPATIENT
Start: 2021-01-01 | End: 2021-01-01 | Stop reason: HOSPADM

## 2021-01-01 RX ORDER — MAGNESIUM HYDROXIDE 1200 MG/15ML
LIQUID ORAL PRN
Status: DISCONTINUED | OUTPATIENT
Start: 2021-01-01 | End: 2021-01-01 | Stop reason: HOSPADM

## 2021-01-01 RX ORDER — NEOSTIGMINE METHYLSULFATE 1 MG/ML
VIAL (ML) INJECTION PRN
Status: DISCONTINUED | OUTPATIENT
Start: 2021-01-01 | End: 2021-01-01

## 2021-01-01 RX ORDER — ACETAMINOPHEN 325 MG/1
975 TABLET ORAL 3 TIMES DAILY
Status: ON HOLD | DISCHARGE
Start: 2021-01-01 | End: 2021-01-01

## 2021-01-01 RX ORDER — NITROGLYCERIN 0.4 MG/1
0.4 TABLET SUBLINGUAL EVERY 5 MIN PRN
Status: DISCONTINUED | OUTPATIENT
Start: 2021-01-01 | End: 2021-01-01

## 2021-01-01 RX ORDER — SODIUM CHLORIDE, SODIUM LACTATE, POTASSIUM CHLORIDE, CALCIUM CHLORIDE 600; 310; 30; 20 MG/100ML; MG/100ML; MG/100ML; MG/100ML
INJECTION, SOLUTION INTRAVENOUS CONTINUOUS
Status: DISCONTINUED | OUTPATIENT
Start: 2021-01-01 | End: 2021-01-01

## 2021-01-01 RX ORDER — AMOXICILLIN 250 MG
2 CAPSULE ORAL 2 TIMES DAILY PRN
Status: DISCONTINUED | OUTPATIENT
Start: 2021-01-01 | End: 2021-01-01 | Stop reason: HOSPADM

## 2021-01-01 RX ORDER — FENTANYL CITRATE 50 UG/ML
25 INJECTION, SOLUTION INTRAMUSCULAR; INTRAVENOUS EVERY 10 MIN PRN
Status: DISCONTINUED | OUTPATIENT
Start: 2021-01-01 | End: 2021-01-01

## 2021-01-01 RX ORDER — FENTANYL CITRATE 50 UG/ML
INJECTION, SOLUTION INTRAMUSCULAR; INTRAVENOUS PRN
Status: DISCONTINUED | OUTPATIENT
Start: 2021-01-01 | End: 2021-01-01

## 2021-01-01 RX ORDER — HYDROMORPHONE HCL IN WATER/PF 6 MG/30 ML
0.2 PATIENT CONTROLLED ANALGESIA SYRINGE INTRAVENOUS
Status: DISCONTINUED | OUTPATIENT
Start: 2021-01-01 | End: 2021-01-01 | Stop reason: HOSPADM

## 2021-01-01 RX ORDER — NALOXONE HYDROCHLORIDE 0.4 MG/ML
0.4 INJECTION, SOLUTION INTRAMUSCULAR; INTRAVENOUS; SUBCUTANEOUS
Status: DISCONTINUED | OUTPATIENT
Start: 2021-01-01 | End: 2021-01-01 | Stop reason: HOSPADM

## 2021-01-01 RX ORDER — CLONIDINE HYDROCHLORIDE 0.1 MG/1
0.1 TABLET ORAL EVERY 6 HOURS PRN
Status: DISCONTINUED | OUTPATIENT
Start: 2021-01-01 | End: 2021-01-01

## 2021-01-01 RX ORDER — BISACODYL 10 MG
10 SUPPOSITORY, RECTAL RECTAL DAILY PRN
Status: DISCONTINUED | OUTPATIENT
Start: 2021-01-01 | End: 2021-01-01 | Stop reason: HOSPADM

## 2021-01-01 RX ORDER — HYDRALAZINE HYDROCHLORIDE 20 MG/ML
10 INJECTION INTRAMUSCULAR; INTRAVENOUS EVERY 4 HOURS PRN
Status: DISCONTINUED | OUTPATIENT
Start: 2021-01-01 | End: 2021-01-01

## 2021-01-01 RX ORDER — NALOXONE HYDROCHLORIDE 0.4 MG/ML
0.2 INJECTION, SOLUTION INTRAMUSCULAR; INTRAVENOUS; SUBCUTANEOUS
Status: DISCONTINUED | OUTPATIENT
Start: 2021-01-01 | End: 2021-01-01 | Stop reason: HOSPADM

## 2021-01-01 RX ORDER — ONDANSETRON 2 MG/ML
4 INJECTION INTRAMUSCULAR; INTRAVENOUS EVERY 6 HOURS PRN
Status: DISCONTINUED | OUTPATIENT
Start: 2021-01-01 | End: 2021-01-01

## 2021-01-01 RX ORDER — POLYETHYLENE GLYCOL 3350 17 G/17G
17 POWDER, FOR SOLUTION ORAL DAILY
Status: DISCONTINUED | OUTPATIENT
Start: 2021-01-01 | End: 2021-01-01 | Stop reason: HOSPADM

## 2021-01-01 RX ORDER — HYDRALAZINE HYDROCHLORIDE 20 MG/ML
10 INJECTION INTRAMUSCULAR; INTRAVENOUS EVERY 4 HOURS PRN
Status: DISCONTINUED | OUTPATIENT
Start: 2021-01-01 | End: 2021-01-01 | Stop reason: HOSPADM

## 2021-01-01 RX ORDER — LIDOCAINE HYDROCHLORIDE 20 MG/ML
INJECTION, SOLUTION INFILTRATION; PERINEURAL PRN
Status: DISCONTINUED | OUTPATIENT
Start: 2021-01-01 | End: 2021-01-01

## 2021-01-01 RX ORDER — CEFAZOLIN SODIUM 1 G/3ML
1 INJECTION, POWDER, FOR SOLUTION INTRAMUSCULAR; INTRAVENOUS EVERY 12 HOURS
Status: COMPLETED | OUTPATIENT
Start: 2021-01-01 | End: 2021-01-01

## 2021-01-01 RX ORDER — BISACODYL 10 MG
10 SUPPOSITORY, RECTAL RECTAL
Status: DISCONTINUED | OUTPATIENT
Start: 2021-06-18 | End: 2021-01-01 | Stop reason: HOSPADM

## 2021-01-01 RX ORDER — ONDANSETRON 2 MG/ML
INJECTION INTRAMUSCULAR; INTRAVENOUS PRN
Status: DISCONTINUED | OUTPATIENT
Start: 2021-01-01 | End: 2021-01-01

## 2021-01-01 RX ORDER — CEFAZOLIN SODIUM 2 G/100ML
2 INJECTION, SOLUTION INTRAVENOUS
Status: DISCONTINUED | OUTPATIENT
Start: 2021-01-01 | End: 2021-01-01 | Stop reason: HOSPADM

## 2021-01-01 RX ORDER — SODIUM CHLORIDE 9 MG/ML
INJECTION, SOLUTION INTRAVENOUS CONTINUOUS
Status: DISCONTINUED | OUTPATIENT
Start: 2021-01-01 | End: 2021-01-01 | Stop reason: ALTCHOICE

## 2021-01-01 RX ORDER — ACETAMINOPHEN 325 MG/1
975 TABLET ORAL 3 TIMES DAILY
Status: DISCONTINUED | OUTPATIENT
Start: 2021-01-01 | End: 2021-01-01 | Stop reason: HOSPADM

## 2021-01-01 RX ORDER — PROPOFOL 10 MG/ML
INJECTION, EMULSION INTRAVENOUS CONTINUOUS PRN
Status: DISCONTINUED | OUTPATIENT
Start: 2021-01-01 | End: 2021-01-01

## 2021-01-01 RX ORDER — HYDROMORPHONE HCL IN WATER/PF 6 MG/30 ML
0.2 PATIENT CONTROLLED ANALGESIA SYRINGE INTRAVENOUS
Status: DISCONTINUED | OUTPATIENT
Start: 2021-01-01 | End: 2021-01-01

## 2021-01-01 RX ORDER — ACETAMINOPHEN 325 MG/1
975 TABLET ORAL EVERY 8 HOURS PRN
Status: DISCONTINUED | OUTPATIENT
Start: 2021-01-01 | End: 2021-01-01

## 2021-01-01 RX ORDER — DOCUSATE SODIUM 100 MG/1
100 CAPSULE, LIQUID FILLED ORAL 2 TIMES DAILY
Status: DISCONTINUED | OUTPATIENT
Start: 2021-01-01 | End: 2021-01-01 | Stop reason: HOSPADM

## 2021-01-01 RX ORDER — CARBOXYMETHYLCELLULOSE SODIUM 5 MG/ML
1-2 SOLUTION/ DROPS OPHTHALMIC EVERY 8 HOURS PRN
Status: DISCONTINUED | OUTPATIENT
Start: 2021-01-01 | End: 2021-01-01 | Stop reason: HOSPADM

## 2021-01-01 RX ORDER — MORPHINE SULFATE 20 MG/ML
5-10 SOLUTION ORAL
Status: DISCONTINUED | OUTPATIENT
Start: 2021-01-01 | End: 2021-01-01 | Stop reason: HOSPADM

## 2021-01-01 RX ORDER — CARBOXYMETHYLCELLULOSE SODIUM 5 MG/ML
1 SOLUTION/ DROPS OPHTHALMIC
Status: ON HOLD | COMMUNITY
End: 2021-01-01

## 2021-01-01 RX ORDER — PROCHLORPERAZINE MALEATE 5 MG
5 TABLET ORAL EVERY 6 HOURS PRN
Status: DISCONTINUED | OUTPATIENT
Start: 2021-01-01 | End: 2021-01-01 | Stop reason: HOSPADM

## 2021-01-01 RX ORDER — PROPOFOL 10 MG/ML
INJECTION, EMULSION INTRAVENOUS PRN
Status: DISCONTINUED | OUTPATIENT
Start: 2021-01-01 | End: 2021-01-01

## 2021-01-01 RX ORDER — GLYCOPYRROLATE 0.2 MG/ML
INJECTION, SOLUTION INTRAMUSCULAR; INTRAVENOUS PRN
Status: DISCONTINUED | OUTPATIENT
Start: 2021-01-01 | End: 2021-01-01

## 2021-01-01 RX ORDER — SODIUM CHLORIDE, SODIUM LACTATE, POTASSIUM CHLORIDE, CALCIUM CHLORIDE 600; 310; 30; 20 MG/100ML; MG/100ML; MG/100ML; MG/100ML
INJECTION, SOLUTION INTRAVENOUS CONTINUOUS
Status: DISCONTINUED | OUTPATIENT
Start: 2021-01-01 | End: 2021-01-01 | Stop reason: HOSPADM

## 2021-01-01 RX ORDER — ONDANSETRON 2 MG/ML
4 INJECTION INTRAMUSCULAR; INTRAVENOUS EVERY 6 HOURS PRN
Status: DISCONTINUED | OUTPATIENT
Start: 2021-01-01 | End: 2021-01-01 | Stop reason: HOSPADM

## 2021-01-01 RX ORDER — LABETALOL HYDROCHLORIDE 5 MG/ML
10 INJECTION, SOLUTION INTRAVENOUS
Status: DISCONTINUED | OUTPATIENT
Start: 2021-01-01 | End: 2021-01-01 | Stop reason: HOSPADM

## 2021-01-01 RX ORDER — LANOLIN ALCOHOL/MO/W.PET/CERES
400 CREAM (GRAM) TOPICAL DAILY
COMMUNITY
End: 2021-01-01

## 2021-01-01 RX ORDER — MORPHINE SULFATE 10 MG/5ML
5-10 SOLUTION ORAL
Status: DISCONTINUED | OUTPATIENT
Start: 2021-01-01 | End: 2021-01-01 | Stop reason: HOSPADM

## 2021-01-01 RX ORDER — GLYCOPYRROLATE 0.2 MG/ML
.2-.4 INJECTION, SOLUTION INTRAMUSCULAR; INTRAVENOUS EVERY 4 HOURS PRN
Status: DISCONTINUED | OUTPATIENT
Start: 2021-01-01 | End: 2021-01-01 | Stop reason: HOSPADM

## 2021-01-01 RX ORDER — AMOXICILLIN 250 MG
1 CAPSULE ORAL 2 TIMES DAILY PRN
Status: DISCONTINUED | OUTPATIENT
Start: 2021-01-01 | End: 2021-01-01 | Stop reason: HOSPADM

## 2021-01-01 RX ORDER — MAGNESIUM OXIDE 400 MG/1
400 TABLET ORAL DAILY
Status: DISCONTINUED | OUTPATIENT
Start: 2021-01-01 | End: 2021-01-01 | Stop reason: HOSPADM

## 2021-01-01 RX ORDER — HYDROMORPHONE HCL IN WATER/PF 6 MG/30 ML
0.2 PATIENT CONTROLLED ANALGESIA SYRINGE INTRAVENOUS EVERY 6 HOURS PRN
Status: DISCONTINUED | OUTPATIENT
Start: 2021-01-01 | End: 2021-01-01

## 2021-01-01 RX ORDER — LIDOCAINE 40 MG/G
CREAM TOPICAL
Status: DISCONTINUED | OUTPATIENT
Start: 2021-01-01 | End: 2021-01-01

## 2021-01-01 RX ORDER — CEFAZOLIN SODIUM 1 G/3ML
1 INJECTION, POWDER, FOR SOLUTION INTRAMUSCULAR; INTRAVENOUS EVERY 8 HOURS
Status: DISCONTINUED | OUTPATIENT
Start: 2021-01-01 | End: 2021-01-01

## 2021-01-01 RX ORDER — AMOXICILLIN 250 MG
1 CAPSULE ORAL 2 TIMES DAILY PRN
Qty: 60 TABLET | Refills: 0 | Status: SHIPPED | OUTPATIENT
Start: 2021-01-01

## 2021-01-01 RX ORDER — AMLODIPINE BESYLATE 2.5 MG/1
2.5 TABLET ORAL DAILY
Status: DISCONTINUED | OUTPATIENT
Start: 2021-01-01 | End: 2021-01-01

## 2021-01-01 RX ORDER — AMOXICILLIN 250 MG
1 CAPSULE ORAL 2 TIMES DAILY
Status: DISCONTINUED | OUTPATIENT
Start: 2021-01-01 | End: 2021-01-01 | Stop reason: HOSPADM

## 2021-01-01 RX ORDER — HALOPERIDOL 0.5 MG/1
.5-1 TABLET ORAL EVERY 6 HOURS PRN
Qty: 30 TABLET | Refills: 1 | Status: SHIPPED | OUTPATIENT
Start: 2021-01-01

## 2021-01-01 RX ORDER — ACETAMINOPHEN 325 MG/1
325-650 TABLET ORAL EVERY 4 HOURS PRN
Qty: 30 TABLET | Refills: 0 | Status: SHIPPED | OUTPATIENT
Start: 2021-01-01

## 2021-01-01 RX ORDER — LIDOCAINE 40 MG/G
CREAM TOPICAL
Status: DISCONTINUED | OUTPATIENT
Start: 2021-01-01 | End: 2021-01-01 | Stop reason: HOSPADM

## 2021-01-01 RX ORDER — CEFEPIME HYDROCHLORIDE 1 G/1
1 INJECTION, POWDER, FOR SOLUTION INTRAMUSCULAR; INTRAVENOUS ONCE
Status: COMPLETED | OUTPATIENT
Start: 2021-01-01 | End: 2021-01-01

## 2021-01-01 RX ORDER — ASPIRIN 325 MG
325 TABLET, DELAYED RELEASE (ENTERIC COATED) ORAL DAILY
Qty: 45 TABLET | Refills: 0 | Status: ON HOLD | OUTPATIENT
Start: 2021-01-01 | End: 2021-01-01

## 2021-01-01 RX ORDER — BUPIVACAINE HYDROCHLORIDE AND EPINEPHRINE 5; 5 MG/ML; UG/ML
INJECTION, SOLUTION PERINEURAL PRN
Status: DISCONTINUED | OUTPATIENT
Start: 2021-01-01 | End: 2021-01-01 | Stop reason: HOSPADM

## 2021-01-01 RX ORDER — CEFAZOLIN SODIUM 2 G/100ML
2 INJECTION, SOLUTION INTRAVENOUS SEE ADMIN INSTRUCTIONS
Status: DISCONTINUED | OUTPATIENT
Start: 2021-01-01 | End: 2021-01-01 | Stop reason: HOSPADM

## 2021-01-01 RX ORDER — ACETAMINOPHEN 325 MG/1
650 TABLET ORAL 4 TIMES DAILY PRN
Qty: 60 TABLET | Refills: 0 | Status: SHIPPED | OUTPATIENT
Start: 2021-01-01 | End: 2021-01-01

## 2021-01-01 RX ORDER — AMLODIPINE BESYLATE 2.5 MG/1
2.5 TABLET ORAL DAILY
Status: ON HOLD | DISCHARGE
Start: 2021-01-01 | End: 2021-01-01

## 2021-01-01 RX ORDER — ONDANSETRON 4 MG/1
4 TABLET, ORALLY DISINTEGRATING ORAL EVERY 6 HOURS PRN
Status: DISCONTINUED | OUTPATIENT
Start: 2021-01-01 | End: 2021-01-01

## 2021-01-01 RX ORDER — CARBOXYMETHYLCELLULOSE SODIUM 5 MG/ML
2 SOLUTION/ DROPS OPHTHALMIC 4 TIMES DAILY
Start: 2021-01-01 | End: 2021-01-01 | Stop reason: DRUGHIGH

## 2021-01-01 RX ORDER — MORPHINE SULFATE 2 MG/ML
1-2 INJECTION, SOLUTION INTRAMUSCULAR; INTRAVENOUS
Status: DISCONTINUED | OUTPATIENT
Start: 2021-01-01 | End: 2021-01-01 | Stop reason: HOSPADM

## 2021-01-01 RX ORDER — DEXAMETHASONE SODIUM PHOSPHATE 4 MG/ML
INJECTION, SOLUTION INTRA-ARTICULAR; INTRALESIONAL; INTRAMUSCULAR; INTRAVENOUS; SOFT TISSUE PRN
Status: DISCONTINUED | OUTPATIENT
Start: 2021-01-01 | End: 2021-01-01

## 2021-01-01 RX ORDER — AMLODIPINE BESYLATE 5 MG/1
5 TABLET ORAL DAILY
Status: DISCONTINUED | OUTPATIENT
Start: 2021-01-01 | End: 2021-01-01 | Stop reason: HOSPADM

## 2021-01-01 RX ORDER — CALCIUM CARBONATE 500 MG/1
1 TABLET, CHEWABLE ORAL 2 TIMES DAILY
Status: ON HOLD | COMMUNITY
End: 2021-01-01

## 2021-01-01 RX ADMIN — ACETAMINOPHEN 650 MG: 325 TABLET, FILM COATED ORAL at 10:39

## 2021-01-01 RX ADMIN — HYDROMORPHONE HYDROCHLORIDE 0.2 MG: 0.2 INJECTION, SOLUTION INTRAMUSCULAR; INTRAVENOUS; SUBCUTANEOUS at 13:48

## 2021-01-01 RX ADMIN — WHITE PETROLATUM: 1 OINTMENT TOPICAL at 12:01

## 2021-01-01 RX ADMIN — DEXTROSE AND SODIUM CHLORIDE: 5; 450 INJECTION, SOLUTION INTRAVENOUS at 19:22

## 2021-01-01 RX ADMIN — DEXTROSE AND SODIUM CHLORIDE: 5; 450 INJECTION, SOLUTION INTRAVENOUS at 06:34

## 2021-01-01 RX ADMIN — SENNOSIDES AND DOCUSATE SODIUM 1 TABLET: 8.6; 5 TABLET ORAL at 09:20

## 2021-01-01 RX ADMIN — ASPIRIN 325 MG: 325 TABLET, COATED ORAL at 08:08

## 2021-01-01 RX ADMIN — HYDROMORPHONE HYDROCHLORIDE 0.2 MG: 0.2 INJECTION, SOLUTION INTRAMUSCULAR; INTRAVENOUS; SUBCUTANEOUS at 10:42

## 2021-01-01 RX ADMIN — WHITE PETROLATUM: 1 OINTMENT TOPICAL at 16:00

## 2021-01-01 RX ADMIN — POLYETHYLENE GLYCOL 3350 17 G: 17 POWDER, FOR SOLUTION ORAL at 10:40

## 2021-01-01 RX ADMIN — PHENYLEPHRINE HYDROCHLORIDE 150 MCG: 10 INJECTION INTRAVENOUS at 13:48

## 2021-01-01 RX ADMIN — IRON SUCROSE 300 MG: 20 INJECTION, SOLUTION INTRAVENOUS at 00:44

## 2021-01-01 RX ADMIN — WHITE PETROLATUM: 1 OINTMENT TOPICAL at 22:11

## 2021-01-01 RX ADMIN — WHITE PETROLATUM: 1 OINTMENT TOPICAL at 10:00

## 2021-01-01 RX ADMIN — ACETAMINOPHEN 650 MG: 325 TABLET, FILM COATED ORAL at 14:39

## 2021-01-01 RX ADMIN — HYDROMORPHONE HYDROCHLORIDE 0.2 MG: 0.2 INJECTION, SOLUTION INTRAMUSCULAR; INTRAVENOUS; SUBCUTANEOUS at 03:09

## 2021-01-01 RX ADMIN — ROCURONIUM BROMIDE 30 MG: 10 INJECTION INTRAVENOUS at 13:40

## 2021-01-01 RX ADMIN — HYDROMORPHONE HYDROCHLORIDE 0.2 MG: 0.2 INJECTION, SOLUTION INTRAMUSCULAR; INTRAVENOUS; SUBCUTANEOUS at 05:58

## 2021-01-01 RX ADMIN — AMLODIPINE BESYLATE 5 MG: 5 TABLET ORAL at 09:27

## 2021-01-01 RX ADMIN — DOCUSATE SODIUM 100 MG: 100 CAPSULE, LIQUID FILLED ORAL at 12:11

## 2021-01-01 RX ADMIN — SODIUM CHLORIDE: 9 INJECTION, SOLUTION INTRAVENOUS at 03:45

## 2021-01-01 RX ADMIN — Medication 30 ML: at 14:00

## 2021-01-01 RX ADMIN — WHITE PETROLATUM: 1 OINTMENT TOPICAL at 06:35

## 2021-01-01 RX ADMIN — DOCUSATE SODIUM 100 MG: 100 CAPSULE, LIQUID FILLED ORAL at 21:06

## 2021-01-01 RX ADMIN — ACETAMINOPHEN 650 MG: 325 TABLET, FILM COATED ORAL at 09:19

## 2021-01-01 RX ADMIN — PROPOFOL 80 MG: 10 INJECTION, EMULSION INTRAVENOUS at 13:40

## 2021-01-01 RX ADMIN — HYDROMORPHONE HYDROCHLORIDE 0.2 MG: 0.2 INJECTION, SOLUTION INTRAMUSCULAR; INTRAVENOUS; SUBCUTANEOUS at 09:08

## 2021-01-01 RX ADMIN — PHENYLEPHRINE HYDROCHLORIDE 100 MCG: 10 INJECTION INTRAVENOUS at 13:42

## 2021-01-01 RX ADMIN — DEXTROSE AND SODIUM CHLORIDE: 5; 450 INJECTION, SOLUTION INTRAVENOUS at 06:26

## 2021-01-01 RX ADMIN — SODIUM CHLORIDE 1000 ML: 9 INJECTION, SOLUTION INTRAVENOUS at 07:06

## 2021-01-01 RX ADMIN — ACETAMINOPHEN 650 MG: 325 TABLET, FILM COATED ORAL at 17:27

## 2021-01-01 RX ADMIN — POLYETHYLENE GLYCOL 3350 17 G: 17 POWDER, FOR SOLUTION ORAL at 09:20

## 2021-01-01 RX ADMIN — MAGNESIUM HYDROXIDE 30 ML: 400 SUSPENSION ORAL at 21:00

## 2021-01-01 RX ADMIN — SODIUM CHLORIDE 500 ML: 9 INJECTION, SOLUTION INTRAVENOUS at 17:58

## 2021-01-01 RX ADMIN — ACETAMINOPHEN 650 MG: 325 TABLET, FILM COATED ORAL at 03:44

## 2021-01-01 RX ADMIN — WHITE PETROLATUM: 1 OINTMENT TOPICAL at 14:08

## 2021-01-01 RX ADMIN — FENTANYL CITRATE 25 MCG: 50 INJECTION INTRAMUSCULAR; INTRAVENOUS at 16:31

## 2021-01-01 RX ADMIN — AMLODIPINE BESYLATE 2.5 MG: 2.5 TABLET ORAL at 09:22

## 2021-01-01 RX ADMIN — CEFEPIME HYDROCHLORIDE 1 G: 1 INJECTION, POWDER, FOR SOLUTION INTRAMUSCULAR; INTRAVENOUS at 16:33

## 2021-01-01 RX ADMIN — LABETALOL HYDROCHLORIDE 10 MG: 5 INJECTION, SOLUTION INTRAVENOUS at 02:05

## 2021-01-01 RX ADMIN — POLYETHYLENE GLYCOL 3350 17 G: 17 POWDER, FOR SOLUTION ORAL at 12:12

## 2021-01-01 RX ADMIN — HYDROMORPHONE HYDROCHLORIDE 0.2 MG: 0.2 INJECTION, SOLUTION INTRAMUSCULAR; INTRAVENOUS; SUBCUTANEOUS at 03:45

## 2021-01-01 RX ADMIN — CEFEPIME HYDROCHLORIDE 2 G: 2 INJECTION, POWDER, FOR SOLUTION INTRAVENOUS at 15:53

## 2021-01-01 RX ADMIN — CEFAZOLIN 1 G: 1 INJECTION, POWDER, FOR SOLUTION INTRAMUSCULAR; INTRAVENOUS at 13:06

## 2021-01-01 RX ADMIN — WHITE PETROLATUM: 1 OINTMENT TOPICAL at 08:15

## 2021-01-01 RX ADMIN — ACETAMINOPHEN 650 MG: 325 TABLET, FILM COATED ORAL at 09:27

## 2021-01-01 RX ADMIN — SENNOSIDES AND DOCUSATE SODIUM 1 TABLET: 8.6; 5 TABLET ORAL at 12:11

## 2021-01-01 RX ADMIN — ASPIRIN 325 MG: 325 TABLET, COATED ORAL at 09:00

## 2021-01-01 RX ADMIN — ACETAMINOPHEN 650 MG: 325 TABLET, FILM COATED ORAL at 10:16

## 2021-01-01 RX ADMIN — HYDROMORPHONE HYDROCHLORIDE 0.2 MG: 0.2 INJECTION, SOLUTION INTRAMUSCULAR; INTRAVENOUS; SUBCUTANEOUS at 10:30

## 2021-01-01 RX ADMIN — WHITE PETROLATUM: 1 OINTMENT TOPICAL at 18:33

## 2021-01-01 RX ADMIN — DEXTROSE AND SODIUM CHLORIDE: 5; 450 INJECTION, SOLUTION INTRAVENOUS at 16:39

## 2021-01-01 RX ADMIN — WHITE PETROLATUM: 1 OINTMENT TOPICAL at 16:44

## 2021-01-01 RX ADMIN — ACETAMINOPHEN 650 MG: 325 TABLET, FILM COATED ORAL at 09:07

## 2021-01-01 RX ADMIN — NEOSTIGMINE METHYLSULFATE 3 MG: 1 INJECTION, SOLUTION INTRAVENOUS at 14:38

## 2021-01-01 RX ADMIN — ASPIRIN 325 MG: 325 TABLET, COATED ORAL at 12:11

## 2021-01-01 RX ADMIN — HYDROMORPHONE HYDROCHLORIDE 0.2 MG: 0.2 INJECTION, SOLUTION INTRAMUSCULAR; INTRAVENOUS; SUBCUTANEOUS at 20:21

## 2021-01-01 RX ADMIN — ACETAMINOPHEN 650 MG: 325 TABLET, FILM COATED ORAL at 00:57

## 2021-01-01 RX ADMIN — WHITE PETROLATUM: 1 OINTMENT TOPICAL at 08:00

## 2021-01-01 RX ADMIN — FENTANYL CITRATE 25 MCG: 50 INJECTION INTRAMUSCULAR; INTRAVENOUS at 17:00

## 2021-01-01 RX ADMIN — WHITE PETROLATUM: 1 OINTMENT TOPICAL at 17:28

## 2021-01-01 RX ADMIN — WHITE PETROLATUM: 1 OINTMENT TOPICAL at 13:06

## 2021-01-01 RX ADMIN — PHENYLEPHRINE HYDROCHLORIDE 150 MCG: 10 INJECTION INTRAVENOUS at 14:08

## 2021-01-01 RX ADMIN — CEFEPIME HYDROCHLORIDE 2 G: 2 INJECTION, POWDER, FOR SOLUTION INTRAVENOUS at 16:39

## 2021-01-01 RX ADMIN — CEFEPIME HYDROCHLORIDE 2 G: 2 INJECTION, POWDER, FOR SOLUTION INTRAVENOUS at 15:48

## 2021-01-01 RX ADMIN — PHENYLEPHRINE HYDROCHLORIDE 100 MCG: 10 INJECTION INTRAVENOUS at 13:56

## 2021-01-01 RX ADMIN — HYDROMORPHONE HYDROCHLORIDE 0.2 MG: 0.2 INJECTION, SOLUTION INTRAMUSCULAR; INTRAVENOUS; SUBCUTANEOUS at 01:02

## 2021-01-01 RX ADMIN — AMLODIPINE BESYLATE 2.5 MG: 2.5 TABLET ORAL at 08:32

## 2021-01-01 RX ADMIN — LIDOCAINE HYDROCHLORIDE 60 MG: 20 INJECTION, SOLUTION INFILTRATION; PERINEURAL at 13:40

## 2021-01-01 RX ADMIN — ASPIRIN 325 MG: 325 TABLET, COATED ORAL at 10:20

## 2021-01-01 RX ADMIN — SODIUM CHLORIDE, POTASSIUM CHLORIDE, SODIUM LACTATE AND CALCIUM CHLORIDE: 600; 310; 30; 20 INJECTION, SOLUTION INTRAVENOUS at 17:46

## 2021-01-01 RX ADMIN — ACETAMINOPHEN 650 MG: 325 TABLET, FILM COATED ORAL at 20:13

## 2021-01-01 RX ADMIN — HYDROMORPHONE HYDROCHLORIDE 0.2 MG: 0.2 INJECTION, SOLUTION INTRAMUSCULAR; INTRAVENOUS; SUBCUTANEOUS at 07:58

## 2021-01-01 RX ADMIN — ACETAMINOPHEN 650 MG: 325 TABLET, FILM COATED ORAL at 21:06

## 2021-01-01 RX ADMIN — ONDANSETRON 4 MG: 2 INJECTION INTRAMUSCULAR; INTRAVENOUS at 14:37

## 2021-01-01 RX ADMIN — HYDROMORPHONE HYDROCHLORIDE 0.2 MG: 0.2 INJECTION, SOLUTION INTRAMUSCULAR; INTRAVENOUS; SUBCUTANEOUS at 16:17

## 2021-01-01 RX ADMIN — SENNOSIDES AND DOCUSATE SODIUM 1 TABLET: 8.6; 5 TABLET ORAL at 21:06

## 2021-01-01 RX ADMIN — SENNOSIDES AND DOCUSATE SODIUM 1 TABLET: 8.6; 5 TABLET ORAL at 10:39

## 2021-01-01 RX ADMIN — WHITE PETROLATUM: 1 OINTMENT TOPICAL at 22:18

## 2021-01-01 RX ADMIN — WHITE PETROLATUM: 1 OINTMENT TOPICAL at 20:31

## 2021-01-01 RX ADMIN — ACETAMINOPHEN 650 MG: 325 TABLET, FILM COATED ORAL at 14:35

## 2021-01-01 RX ADMIN — ASPIRIN 325 MG: 325 TABLET, COATED ORAL at 21:06

## 2021-01-01 RX ADMIN — PROPOFOL 65 MCG/KG/MIN: 10 INJECTION, EMULSION INTRAVENOUS at 13:53

## 2021-01-01 RX ADMIN — ACETAMINOPHEN 650 MG: 325 TABLET, FILM COATED ORAL at 20:59

## 2021-01-01 RX ADMIN — ASPIRIN 325 MG: 325 TABLET, COATED ORAL at 08:31

## 2021-01-01 RX ADMIN — CEFEPIME HYDROCHLORIDE 2 G: 2 INJECTION, POWDER, FOR SOLUTION INTRAVENOUS at 17:27

## 2021-01-01 RX ADMIN — WHITE PETROLATUM: 1 OINTMENT TOPICAL at 20:21

## 2021-01-01 RX ADMIN — AMLODIPINE BESYLATE 2.5 MG: 2.5 TABLET ORAL at 08:09

## 2021-01-01 RX ADMIN — SODIUM CHLORIDE, POTASSIUM CHLORIDE, SODIUM LACTATE AND CALCIUM CHLORIDE: 600; 310; 30; 20 INJECTION, SOLUTION INTRAVENOUS at 12:50

## 2021-01-01 RX ADMIN — ASPIRIN 325 MG: 325 TABLET, COATED ORAL at 10:39

## 2021-01-01 RX ADMIN — WHITE PETROLATUM: 1 OINTMENT TOPICAL at 05:42

## 2021-01-01 RX ADMIN — WHITE PETROLATUM: 1 OINTMENT TOPICAL at 19:00

## 2021-01-01 RX ADMIN — CLONIDINE HYDROCHLORIDE 0.1 MG: 0.1 TABLET ORAL at 09:07

## 2021-01-01 RX ADMIN — CLOSTRIDIUM TETANI TOXOID ANTIGEN (FORMALDEHYDE INACTIVATED) AND CORYNEBACTERIUM DIPHTHERIAE TOXOID ANTIGEN (FORMALDEHYDE INACTIVATED) 0.5 ML: 5; 2 INJECTION, SUSPENSION INTRAMUSCULAR at 02:08

## 2021-01-01 RX ADMIN — GLYCOPYRROLATE 0.4 MG: 0.2 INJECTION, SOLUTION INTRAMUSCULAR; INTRAVENOUS at 14:38

## 2021-01-01 RX ADMIN — WHITE PETROLATUM: 1 OINTMENT TOPICAL at 22:05

## 2021-01-01 RX ADMIN — AMLODIPINE BESYLATE 2.5 MG: 2.5 TABLET ORAL at 09:00

## 2021-01-01 RX ADMIN — PHENYLEPHRINE HYDROCHLORIDE 100 MCG: 10 INJECTION INTRAVENOUS at 14:48

## 2021-01-01 RX ADMIN — HYDROMORPHONE HYDROCHLORIDE 0.2 MG: 0.2 INJECTION, SOLUTION INTRAMUSCULAR; INTRAVENOUS; SUBCUTANEOUS at 02:13

## 2021-01-01 RX ADMIN — MORPHINE SULFATE 5 MG: 10 SOLUTION ORAL at 03:59

## 2021-01-01 RX ADMIN — CEFAZOLIN 1 G: 1 INJECTION, POWDER, FOR SOLUTION INTRAMUSCULAR; INTRAVENOUS at 00:45

## 2021-01-01 RX ADMIN — ASPIRIN 325 MG: 325 TABLET, COATED ORAL at 09:20

## 2021-01-01 RX ADMIN — WHITE PETROLATUM: 1 OINTMENT TOPICAL at 14:51

## 2021-01-01 RX ADMIN — WHITE PETROLATUM: 1 OINTMENT TOPICAL at 06:27

## 2021-01-01 RX ADMIN — WHITE PETROLATUM: 1 OINTMENT TOPICAL at 12:00

## 2021-01-01 RX ADMIN — DOCUSATE SODIUM 100 MG: 100 CAPSULE, LIQUID FILLED ORAL at 09:20

## 2021-01-01 RX ADMIN — CEFAZOLIN SODIUM 2 G: 2 INJECTION, SOLUTION INTRAVENOUS at 13:47

## 2021-01-01 RX ADMIN — DEXAMETHASONE SODIUM PHOSPHATE 4 MG: 4 INJECTION, SOLUTION INTRA-ARTICULAR; INTRALESIONAL; INTRAMUSCULAR; INTRAVENOUS; SOFT TISSUE at 13:47

## 2021-01-01 RX ADMIN — LABETALOL HYDROCHLORIDE 10 MG: 5 INJECTION, SOLUTION INTRAVENOUS at 07:58

## 2021-01-01 RX ADMIN — PHENYLEPHRINE HYDROCHLORIDE 150 MCG: 10 INJECTION INTRAVENOUS at 14:32

## 2021-01-01 RX ADMIN — DEXTROSE AND SODIUM CHLORIDE: 5; 450 INJECTION, SOLUTION INTRAVENOUS at 19:04

## 2021-01-01 RX ADMIN — CLONIDINE HYDROCHLORIDE 0.1 MG: 0.1 TABLET ORAL at 00:57

## 2021-01-01 RX ADMIN — HYDROMORPHONE HYDROCHLORIDE 0.2 MG: 0.2 INJECTION, SOLUTION INTRAMUSCULAR; INTRAVENOUS; SUBCUTANEOUS at 18:38

## 2021-01-01 RX ADMIN — SENNOSIDES AND DOCUSATE SODIUM 1 TABLET: 8.6; 5 TABLET ORAL at 20:59

## 2021-01-01 RX ADMIN — DOCUSATE SODIUM 100 MG: 100 CAPSULE, LIQUID FILLED ORAL at 21:00

## 2021-01-01 RX ADMIN — SENNOSIDES AND DOCUSATE SODIUM 1 TABLET: 8.6; 5 TABLET ORAL at 20:13

## 2021-01-01 RX ADMIN — Medication 400 MG: at 12:11

## 2021-01-01 RX ADMIN — WHITE PETROLATUM: 1 OINTMENT TOPICAL at 15:48

## 2021-01-01 RX ADMIN — FENTANYL CITRATE 50 MCG: 50 INJECTION, SOLUTION INTRAMUSCULAR; INTRAVENOUS at 13:40

## 2021-01-01 RX ADMIN — WHITE PETROLATUM: 1 OINTMENT TOPICAL at 08:50

## 2021-01-01 RX ADMIN — ASPIRIN 325 MG: 325 TABLET, COATED ORAL at 09:22

## 2021-01-01 RX ADMIN — WHITE PETROLATUM: 1 OINTMENT TOPICAL at 22:23

## 2021-01-01 ASSESSMENT — ENCOUNTER SYMPTOMS
NECK PAIN: 0
WOUND: 1
ARTHRALGIAS: 1

## 2021-01-01 ASSESSMENT — ACTIVITIES OF DAILY LIVING (ADL)
ADLS_ACUITY_SCORE: 29
ADLS_ACUITY_SCORE: 21
ADLS_ACUITY_SCORE: 23
WALKING_OR_CLIMBING_STAIRS_DIFFICULTY: YES
ADLS_ACUITY_SCORE: 21
ADLS_ACUITY_SCORE: 22
ADLS_ACUITY_SCORE: 21
ADLS_ACUITY_SCORE: 21
ADLS_ACUITY_SCORE: 23
ADLS_ACUITY_SCORE: 21
ADLS_ACUITY_SCORE: 29
DRESSING/BATHING_DIFFICULTY: YES
ADLS_ACUITY_SCORE: 25
ADLS_ACUITY_SCORE: 18
ADLS_ACUITY_SCORE: 21
ADLS_ACUITY_SCORE: 25
ADLS_ACUITY_SCORE: 21
ADLS_ACUITY_SCORE: 20
ADLS_ACUITY_SCORE: 25
ADLS_ACUITY_SCORE: 25
ADLS_ACUITY_SCORE: 21
ADLS_ACUITY_SCORE: 25
DOING_ERRANDS_INDEPENDENTLY_DIFFICULTY: YES
ADLS_ACUITY_SCORE: 24
ADLS_ACUITY_SCORE: 22
ADLS_ACUITY_SCORE: 25
ADLS_ACUITY_SCORE: 21
ADLS_ACUITY_SCORE: 21
ADLS_ACUITY_SCORE: 22
ADLS_ACUITY_SCORE: 23
ADLS_ACUITY_SCORE: 21
ADLS_ACUITY_SCORE: 24
ADLS_ACUITY_SCORE: 21
ADLS_ACUITY_SCORE: 25
ADLS_ACUITY_SCORE: 21
ADLS_ACUITY_SCORE: 23
ADLS_ACUITY_SCORE: 21
ADLS_ACUITY_SCORE: 21
ADLS_ACUITY_SCORE: 22
ADLS_ACUITY_SCORE: 25
ADLS_ACUITY_SCORE: 21
ADLS_ACUITY_SCORE: 20
ADLS_ACUITY_SCORE: 21
ADLS_ACUITY_SCORE: 21

## 2021-01-01 ASSESSMENT — LIFESTYLE VARIABLES: TOBACCO_USE: 1

## 2021-01-01 ASSESSMENT — MIFFLIN-ST. JEOR
SCORE: 898.49
SCORE: 907.1
SCORE: 907.1
SCORE: 876.72
SCORE: 890.32
SCORE: 876.72

## 2021-01-01 ASSESSMENT — COPD QUESTIONNAIRES: COPD: 0

## 2021-01-22 NOTE — PROGRESS NOTES
[facility]  Chief Complaint   Patient presents with     detention Regulatory     Wellness Visit     [facility] Medical Record Number:  [id number]  Place of Service where encounter took place:  [facility] ([facility]) [[id number]]    HPI:    Rosie Guerrero  is 89 year old (5/25/1928), who is being seen today for a federally mandated E/M visit.  HPI information obtained from: facility chart records, facility staff, patient report and [facility] chart review. Today's concerns are: no issues per staff, ambulates by self     Dementia without behavioral disturbance, unspecified dementia type (H)  History of CVA (cerebrovascular accident)  Hyperlipidemia, unspecified hyperlipidemia type  Stage 3a chronic kidney disease  Macular degeneration (senile) of retina  Arthritis  Osteoarthritis of multiple joints, unspecified osteoarthritis type  Personal history of fall      ALLERGIES:Lipitor [hmg-coa-r inhibitors], Penicillin [penicillins], and Sulfa drugs  PAST MEDICAL HISTORY:   has a past medical history of Generalized osteoarthrosis, unspecified site, Macular degeneration (senile) of retina, unspecified, Other and unspecified hyperlipidemia, Other malignant neoplasm of skin of trunk, except scrotum, Personal history of fall (11/2020), and Rubella without mention of complication.  PAST SURGICAL HISTORY:   has a past surgical history that includes hysterectomy, fuad (1970); REMOVE TONSILS/ADENOIDS,<11 Y/O (1934); COLONOSCOPY THRU STOMA, DIAGNOSTIC (1994); and flex sig (medicare pt.) (1996).  FAMILY HISTORY: family history includes Cerebrovascular Disease in her father; Neurologic Disorder in her mother.  SOCIAL HISTORY:  reports that she quit smoking about 47 years ago. She has never used smokeless tobacco. She reports current alcohol use. She reports that she does not use drugs.    MEDICATIONS:  Current Outpatient Medications   Medication Sig Dispense Refill     acetaminophen  "(TYLENOL) 325 MG tablet Take 650 mg by mouth 4 times daily as needed for mild pain       ascorbic acid (VITAMIN C) 500 MG tablet Take 500 mg by mouth daily  30 tablet      aspirin 81 MG EC tablet Take 1 tablet (81 mg) by mouth daily 90 tablet 3     calcium carbonate (OS-PATRICIA 500 MG [place]. CA) 500 MG tablet Take 500 mg by mouth daily       coenzyme Q-10 10 MG CAPS Take 10 mg by mouth daily  30 capsule      multivitamin w/minerals (THERA-VIT-M) tablet Take 1 tablet by mouth daily       polyethylene glycol (MIRALAX) 17 g packet Take 17 g by mouth daily as needed for constipation 30 packet 3           Case Management:  I have reviewed the care plan and MDS and do agree with the plan. Patient's desire to return to the community is not present. Information reviewed:  Medications, vital signs, orders, and nursing notes.    ROS:  limited  secondary to cognitive impairment and very few words to questions.  No CP, sob, or pain,    Vitals:  /68   Pulse 62   Temp 96.6  F (35.9  C)   Resp 18   Ht 1.727 m (5' 8\")   Wt 41.8 kg (92 lb 3.2 oz)   SpO2 96%   BMI 14.02 kg/m    Body mass index is 14.02 kg/m .  Exam:  GENERAL APPEARANCE:  in no distress, cooperative  ENT:  Mouth with mist mucous membranes, [place]  RESP:  respiratory effort  of chest normal, lungs clear to auscultation  CV: Auscultation of heart done , regular rate and rhythm, no murmur, rub, or gallop, trace pedal edema  ABDOMEN:  normal bowel sounds, soft,   M/S:   CANNON equally--seen in chair--and then up walking in room  SKIN:  Inspection of skin and subcutaneous tissue baseline  NEURO:   Cranial nerves grossly intact and at patient's baseline  PSYCH:  insight and judgement impaired, memory impaired , affect and mood normal      Lab/Diagnostic data:   Recent Labs   Lab Test 11/02/20  1209 05/08/20  1136    141   POTASSIUM 4.6 4.5   CHLORIDE 110* 108   CO2 29 30   ANIONGAP 3 3   GLC 95 92   BUN 25 29   CR 0.96 1.28*   PATRICIA 9.7 10.2*     CBC RESULTS:   Recent " "Labs   Lab Test 11/02/20  1209   WBC 11.6*   RBC 4.01   HGB 12.5   HCT 38.0   MCV 95   MCH 31.2   MCHC 32.9   RDW 13.9            Annual Wellness Visit    Are you in the first 12 months of your Medicare Part B coverage?  No     Annual Wellness Visit: attempt made during this visit to complete the \"Annual Wellness Visit\" Pt has dementia and speaks very little. It is not possible to complete this except the following with staff and nursing assistance:        Current providers sharing in care for this patient include:    Patient Care Team:  Deena Main APRN CNP as PCP - General (Internal Medicine)  Gwendolyn Segura MD as MD (Internal Medicine)  Deena Main APRN CNP as Assigned PCP     ASSESSMENT / PLAN:  (F03.90) Dementia without behavioral disturbance, unspecified dementia type (H)  (primary encounter diagnosis)  Comment/(Z86.73) History of CVA (cerebrovascular accident)  (Z91.81) Personal history of fall  Comment/Plan: was unable to stay in [facility] in [facility] due to falls and care needs with increasing dementia.  She was transferred here in November.   now 24 hr care, monitoring, no acute issues or falls , monitor    (E78.5) Hyperlipidemia, unspecified hyperlipidemia type  Comment/Plan: off meds, goal is to minimize meds for pt    (N18.31) Stage 3a chronic kidney disease  Comment/Plan: stead when checked in November , check yearly or prn    (H35.30) Macular degeneration (senile) of retina  Comment/Plan: decreased sight--fall risk    (M19.90) Arthritis   (M15.9) Osteoarthritis of multiple joints, unspecified osteoarthritis type  Comment/Plan: no acute issues, dc'd tramadol as not using , has prn tylenol, adjust  as needed       Electronically signed by:  ZAY Lafleur CNP           "

## 2021-01-24 PROBLEM — Z86.73 HISTORY OF CVA (CEREBROVASCULAR ACCIDENT): Status: ACTIVE | Noted: 2021-01-01

## 2021-01-24 PROBLEM — M19.90 ARTHRITIS: Status: ACTIVE | Noted: 2021-01-01

## 2021-02-11 NOTE — LETTER
2/11/2021        RE: Rosie Guerrero  [facility]  [address]  [facility] [address]        Rosie Guerrero is a 89 year old female seen February 11, 2021 at [facility] where she has resided for 3 months (admit 11/2020) seen to follow up multiple medical problems.   Today she is seen in her room resting abed.   Does not engage much, states she is tired.   Very limited history.  No new concerns reported by nursing staff.           By chart review, pt was living in AL at [facility], but with cognitive and physical decline, higher care needs.   She was seen in the ED in November 2020 after a fall.   No significant injuries found on imaging, but determined that her AL could no longer meet her needs and she has been transferred to LT for permanent placement.    Patient had a stroke in 2017, with ischemic infarcts in the right parieto-occipital lobe on imaging, and high grade stenosis of right external carotid at its origin.  She was seen by Neurology, not a candidate for tPA.  Started on ASA, had Stroke Rehab.   She has longstanding hyperlipidemia, intolerant to all statins.   She is otherwise fairly well, takes few prescription medications, but does take a lot of vitamins and supplements.          Past Medical History:   Diagnosis Date     Generalized osteoarthrosis, unspecified site     Hx of cervicalgia, and sciatica, resolved with PT     Macular degeneration (senile) of retina, unspecified      Other and unspecified hyperlipidemia      Other malignant neoplasm of skin of trunk, except scrotum     Multiple Basal cell CA excisions     Personal history of fall 11/2020     Rubella without mention of complication     Past history of Rubella   Cerebral vascular disease, s/p CVI 2017  Late onset dementia    Past Surgical History:   Procedure Laterality Date     FLEX SIG (MEDICARE PT.)  1996    NORMAL     HC COLONOSCOPY THRU STOMA, DIAGNOSTIC  1994    NORMAL     HC  "REMOVE TONSILS/ADENOIDS,<13 Y/O  1934     HYSTERECTOMY, KEMI  1970    With BSO, secondary to fibroids     SH:  Resident of [facility], .    States \"I had 2 husbands and no children.\"     No siblings.   Her Healthcare Agent is Ash Koo  Past smoker    Review Of Systems  Wt Readings from Last 5 Encounters:   02/11/21 41.8 kg (92 lb 3.2 oz)   01/05/21 41.8 kg (92 lb 3.2 oz)   11/18/20 40.1 kg (88 lb 6.4 oz)   05/08/20 40.6 kg (89 lb 9.6 oz)   04/28/20 42.6 kg (94 lb)       GENERAL APPEARANCE: alert and no distress, frail  BP (!) 155/73   Pulse 60   Temp 97.4  F (36.3  C)   Resp 18   Ht 1.727 m (5' 8\")   Wt 41.8 kg (92 lb 3.2 oz)   SpO2 96%   BMI 14.02 kg/m     HEENT: normocephalic, no lesion or abnormalities  NECK: no adenopathy, no asymmetry, masses, or scars and thyroid normal to palpation  RESP: decreased BS, no rales or wheeze  CV: regular rate and rhythm, normal S1 S2, 1/6 JH  ABDOMEN:  soft, nontender, no HSM or masses and bowel sounds normal  MS: extremities normal- no ext edema  SKIN: no suspicious lesions or rashes  NEURO: disorientation, STML, some confusion  PSYCH: affect okay, pleasant  LYMPHATICS: No cervical,  or supraclavicular nodes     Labs reviewed    ECHO 1/2017   Interpretation Summary  1. The left ventricle is normal in structure, function and size. The visual ejection fraction is estimated at 65%.  2. The right ventricle is normal in structure, function and size.  3. There is no atrial shunt seen. A contrast injection (Bubble Study) was performed that was negative for flow across the interatrial septum.  4. No valve disease.    CT SCAN OF THE HEAD WITHOUT CONTRAST   11/2/2020 1:22 PM                                                            IMPRESSION:   1. No intracranial bleed or skull fractures are identified.  2. There is diffuse parenchymal volume loss.  White matter changes are  present in the cerebral hemispheres that are consistent with small vessel ischemic disease in this age " patient.  3. Chronic encephalomalacia in the right occipital-parietal region.  This was present on the scan from 2017.       IMP/PLAN:   (I67.9) Cerebral vascular disease    (Z86.73) History of CVA (cerebrovascular accident)  Comment: right parieto-occipital ischemic stroke in 2017      Seen by Neurology at the time and no further workup recommended.     Plan: daily ASA for secondary prevention    (N18.31) Stage 3a chronic kidney disease  Comment: may be secondary to HTN  BP Readings from Last 3 Encounters:   02/11/21 (!) 155/73   01/20/21 136/68   12/17/20 (!) (P) 143/77      Plan: would consider addition of anti-hypertensive if SBP persistently >150      Follow BMP      (H35.30) Macular degeneration, unspecified laterality, unspecified type  Comment: very poor vision     Plan: supportive care, AREDS       (F03.90) Dementia without behavioral disturbance, unspecified dementia type (H)  Comment: significant decline in functional status   Plan: LTC support for meds, meals, activity       (E78.5) Hyperlipidemia, unspecified hyperlipidemia type  Comment: longstanding      Plan: intolerant to statins, will not attempt treatment at this point        (R63.4) Weight loss  Comment: declining weight over past few years     Plan: preferences, nutritional supplements, follow weights as she transitions to LTC    Would look to stopping the multiple vitamins and supplements she is taking, lower her pill burden and hopefully improve appetite.       The health plan new enrollment has happened. I have reviewed the  MDS, the preventative needs,  and facility care plan. The level of care is appropriate. I have reviewed the code status/advanced directives.     Gwendolyn Segura MD         Sincerely,        Gwendolyn Segura MD

## 2021-02-15 NOTE — PROGRESS NOTES
Rosie Guerrero is a 89 year old female seen February 11, 2021 at [facility] where she has resided for 3 months (admit 11/2020) seen to follow up multiple medical problems.   Today she is seen in her room resting abed.   Does not engage much, states she is tired.   Very limited history.  No new concerns reported by nursing staff.           By chart review, pt was living in AL at [facility], but with cognitive and physical decline, higher care needs.   She was seen in the ED in November 2020 after a fall.   No significant injuries found on imaging, but determined that her AL could no longer meet her needs and she has been transferred to LT for permanent placement.    Patient had a stroke in 2017, with ischemic infarcts in the right parieto-occipital lobe on imaging, and high grade stenosis of right external carotid at its origin.  She was seen by Neurology, not a candidate for tPA.  Started on ASA, had Stroke Rehab.   She has longstanding hyperlipidemia, intolerant to all statins.   She is otherwise fairly well, takes few prescription medications, but does take a lot of vitamins and supplements.          Past Medical History:   Diagnosis Date     Generalized osteoarthrosis, unspecified site     Hx of cervicalgia, and sciatica, resolved with PT     Macular degeneration (senile) of retina, unspecified      Other and unspecified hyperlipidemia      Other malignant neoplasm of skin of trunk, except scrotum     Multiple Basal cell CA excisions     Personal history of fall 11/2020     Rubella without mention of complication     Past history of Rubella   Cerebral vascular disease, s/p CVI 2017  Late onset dementia    Past Surgical History:   Procedure Laterality Date     FLEX SIG (MEDICARE PT.)  1996    NORMAL     HC COLONOSCOPY THRU STOMA, DIAGNOSTIC  1994    NORMAL     HC REMOVE TONSILS/ADENOIDS,<11 Y/O  1934     HYSTERECTOMY, KEMI  1970    With BSO, secondary to fibroids     SH:  Resident of  "LTC, .    States \"I had 2 husbands and no children.\"     No siblings.   Her Healthcare Agent is Ash Koo  Past smoker    Review Of Systems  Wt Readings from Last 5 Encounters:   02/11/21 41.8 kg (92 lb 3.2 oz)   01/05/21 41.8 kg (92 lb 3.2 oz)   11/18/20 40.1 kg (88 lb 6.4 oz)   05/08/20 40.6 kg (89 lb 9.6 oz)   04/28/20 42.6 kg (94 lb)       GENERAL APPEARANCE: alert and no distress, frail  BP (!) 155/73   Pulse 60   Temp 97.4  F (36.3  C)   Resp 18   Ht 1.727 m (5' 8\")   Wt 41.8 kg (92 lb 3.2 oz)   SpO2 96%   BMI 14.02 kg/m     HEENT: normocephalic, no lesion or abnormalities  NECK: no adenopathy, no asymmetry, masses, or scars and thyroid normal to palpation  RESP: decreased BS, no rales or wheeze  CV: regular rate and rhythm, normal S1 S2, 1/6 JH  ABDOMEN:  soft, nontender, no HSM or masses and bowel sounds normal  MS: extremities normal- no ext edema  SKIN: no suspicious lesions or rashes  NEURO: disorientation, STML, some confusion  PSYCH: affect okay, pleasant  LYMPHATICS: No cervical,  or supraclavicular nodes     Labs reviewed    ECHO 1/2017   Interpretation Summary  1. The left ventricle is normal in structure, function and size. The visual ejection fraction is estimated at 65%.  2. The right ventricle is normal in structure, function and size.  3. There is no atrial shunt seen. A contrast injection (Bubble Study) was performed that was negative for flow across the interatrial septum.  4. No valve disease.    CT SCAN OF THE HEAD WITHOUT CONTRAST   11/2/2020 1:22 PM                                                            IMPRESSION:   1. No intracranial bleed or skull fractures are identified.  2. There is diffuse parenchymal volume loss.  White matter changes are  present in the cerebral hemispheres that are consistent with small vessel ischemic disease in this age patient.  3. Chronic encephalomalacia in the right occipital-parietal region.  This was present on the scan from " 2017.       IMP/PLAN:   (I67.9) Cerebral vascular disease    (Z86.73) History of CVA (cerebrovascular accident)  Comment: right parieto-occipital ischemic stroke in 2017      Seen by Neurology at the time and no further workup recommended.     Plan: daily ASA for secondary prevention    (N18.31) Stage 3a chronic kidney disease  Comment: may be secondary to HTN  BP Readings from Last 3 Encounters:   02/11/21 (!) 155/73   01/20/21 136/68   12/17/20 (!) (P) 143/77      Plan: would consider addition of anti-hypertensive if SBP persistently >150      Follow BMP      (H35.30) Macular degeneration, unspecified laterality, unspecified type  Comment: very poor vision     Plan: supportive care, AREDS       (F03.90) Dementia without behavioral disturbance, unspecified dementia type (H)  Comment: significant decline in functional status   Plan: LTC support for meds, meals, activity       (E78.5) Hyperlipidemia, unspecified hyperlipidemia type  Comment: longstanding      Plan: intolerant to statins, will not attempt treatment at this point        (R63.4) Weight loss  Comment: declining weight over past few years     Plan: preferences, nutritional supplements, follow weights as she transitions to LTC    Would look to stopping the multiple vitamins and supplements she is taking, lower her pill burden and hopefully improve appetite.       The health plan new enrollment has happened. I have reviewed the  MDS, the preventative needs,  and facility care plan. The level of care is appropriate. I have reviewed the code status/advanced directives.     Gwendolyn Segura MD

## 2021-02-22 NOTE — TELEPHONE ENCOUNTER
[place] [facility] TELEPHONE ENCOUNTER    Chief Complaint   Patient presents with     red, mattery eye       Rosie Guerrero is a 89 year old  (5/25/1928),Nurse called today to report: she has yellow discharge from her left eye and redness in both eyes.  Does report they are slightly itchy.      ASSESSMENT/PLAN  1. Blepharitis of both eyes, unspecified eyelid, unspecified type    - carboxymethylcellulose PF (CARBOXYMETHYLCELLULOSE SODIUM) 0.5 % ophthalmic solution; Place 2 drops into both eyes 4 times daily for 5 days      Warm compress to both eyes QID x 5 days    Follow up with PCP as needed    Electronically signed by:   ZAY Eduardo CNP

## 2021-02-22 NOTE — PROGRESS NOTES
[place] [facility]  [facility] [facility] Record Number:  [id number]  Place of Service where encounter took place:  No question data found.  Chief Complaint   Patient presents with     Nursing Home Acute     FVP new enrollee       HPI:    Rosie Guerrero  is a 89 year old (5/25/1928), who is being seen today for an episodic care visit.  HPI information obtained from: {FGS HPI:385298}. Today's concern is:  {FGS DX:854456}    Past Medical and Surgical History reviewed in Epic today.    MEDICATIONS:  {Providers Please double check the med list (in the plan section >> meds & orders tab) and Discontinue any of the meds flagged by the TC to be discontinued}  Current Outpatient Medications   Medication Sig Dispense Refill     acetaminophen (TYLENOL) 325 MG tablet Take 650 mg by mouth 4 times daily as needed for mild pain       ascorbic acid (VITAMIN C) 500 MG tablet Take 500 mg by mouth daily  30 tablet      aspirin 81 MG EC tablet Take 1 tablet (81 mg) by mouth daily 90 tablet 3     calcium carbonate (OS-PATRICIA 500 MG [place]. [place]) 500 MG tablet Take 500 mg by mouth daily       carboxymethylcellulose PF (CARBOXYMETHYLCELLULOSE SODIUM) 0.5 % ophthalmic solution Place 2 drops into both eyes 4 times daily for 5 days       coenzyme Q-10 10 MG CAPS Take 10 mg by mouth daily  30 capsule      multivitamin w/minerals (THERA-VIT-M) tablet Take 1 tablet by mouth daily       polyethylene glycol (MIRALAX) 17 g packet Take 17 g by mouth daily as needed for constipation 30 packet 3     ***    REVIEW OF SYSTEMS:  {ROS FGS:347986}    Objective:  There were no vitals taken for this visit.  Exam:  {longterm physical exam :295669}    Labs:   {fgslab:511798}    The health plan new enrollment has happened. I have reviewed the  MDS, the preventative needs,  and facility care plan. The level of care is appropriate. I have reviewed the code status/advanced directives.       ASSESSMENT/PLAN:  {FGS  DX:590668}    {fgsorders:798537}  ***    {fgstime1:873764}  Electronically signed by:  Ligia Stephen MA ***  {Providers Please double check the med list (in the plan section >> meds & orders tab) and Discontinue any of the meds flagged by the TC to be discontinued}

## 2021-02-22 NOTE — TELEPHONE ENCOUNTER
Message left on non-emergent VM today reporting some yellow discoloration and redness of the left eye. No drainage or discomfort. Nurse will continue to monitor unless there is worsening of SX.     Nina Stout RN  [facility]

## 2021-02-23 NOTE — PROGRESS NOTES
"Chief Complaint: recheck of blepharitis eyes    The health plan new enrollment has happened. I have reviewed the  MDS, the preventative needs,  and facility care plan. The level of care is appropriate. I have reviewed the code status/advanced directives.     HPI:    Rosie Guerrero is a 89 year old  (5/25/1928), who is being seen today for an episodic care visit at [facility]. Today's concern is:     Blepharitis of upper and lower eyelids of both eyes, unspecified type  Macular degeneration (senile) of retina  Dementia without behavioral disturbance, unspecified dementia type (H)      REVIEW OF SYSTEMS:  States no pain or itching in eyes now, feels \"fine\"  Per nurse, greatly improved also    BP (!) 144/70   Pulse 64   Temp 98.1  F (36.7  C)   Resp 18   Ht 1.727 m (5' 8\")   Wt 43.2 kg (95 lb 3.2 oz)   SpO2 97%   BMI 14.48 kg/m    GENERAL APPEARANCE:  Alert,oriented x person, ?place,  Resp status Non distressed,  No focal neurological deficits.     ASSESSMENT / PLAN:  (H01.00A,  H01.00B) Blepharitis of upper and lower eyelids of both eyes, unspecified type  (primary encounter diagnosis)   (H35.30) Macular degeneration (senile) of retina  Comment: improving,   Plan: will cont the treatment with warm compresses and  carboxymethylcellulose NA  gtts but decrease to TID and then discontinue after 5 days if resolved. Call if not.    (F03.90) Dementia without behavioral disturbance, unspecified dementia type (H)  Comment/Plan: in LTC, needs help with ADL's, cares        Electronically Signed by:  ZAY Lafleur CNP    "

## 2021-02-23 NOTE — PROGRESS NOTES
[place] [facility]  [place] [facility] Record Number:  [id number]  Place of Service where encounter took place:  [facility] () [[id number]]  Chief Complaint   Patient presents with     Nursing Home Acute     FVP new enrollee     The health plan new enrollment has happened. I have reviewed the  MDS, the preventative needs,  and facility care plan. The level of care is appropriate. I have reviewed the code status/advanced directives.     HPI:    Rosie Guerrero  is a 89 year old (5/25/1928), who is being seen today for an episodic care visit.  HPI information obtained from: {FGS HPI:409900}. Today's concern is:  {FGS DX:378661}    Past Medical and Surgical History reviewed in Epic today.    MEDICATIONS:  {Providers Please double check the med list (in the plan section >> meds & orders tab) and Discontinue any of the meds flagged by the TC to be discontinued}  Current Outpatient Medications   Medication Sig Dispense Refill     acetaminophen (TYLENOL) 325 MG tablet Take 650 mg by mouth 4 times daily as needed for mild pain       ascorbic acid (VITAMIN C) 500 MG tablet Take 500 mg by mouth daily  30 tablet      aspirin 81 MG EC tablet Take 1 tablet (81 mg) by mouth daily 90 tablet 3     calcium carbonate (OS-PATRICIA 500 MG [place]. [place]) 500 MG tablet Take 500 mg by mouth daily       carboxymethylcellulose PF (CARBOXYMETHYLCELLULOSE SODIUM) 0.5 % ophthalmic solution Place 2 drops into both eyes 4 times daily for 5 days       coenzyme Q-10 10 MG CAPS Take 10 mg by mouth daily  30 capsule      multivitamin w/minerals (THERA-VIT-M) tablet Take 1 tablet by mouth daily       polyethylene glycol (MIRALAX) 17 g packet Take 17 g by mouth daily as needed for constipation 30 packet 3     ***    REVIEW OF SYSTEMS:  {ROS FGS:405386}    Objective:  There were no vitals taken for this visit.  Exam:  {half-way physical exam :995729}    Labs:   {fgslab:562372}    ASSESSMENT/PLAN:  {FGS  DX:098748}    {fgsorders:304268}  ***    {fgstime1:059964}  Electronically signed by:  Ligia Stephen MA ***  {Providers Please double check the med list (in the plan section >> meds & orders tab) and Discontinue any of the meds flagged by the TC to be discontinued}

## 2021-02-24 PROBLEM — H01.00A BLEPHARITIS OF UPPER AND LOWER EYELIDS OF BOTH EYES, UNSPECIFIED TYPE: Status: ACTIVE | Noted: 2021-01-01

## 2021-02-24 PROBLEM — H01.00B BLEPHARITIS OF UPPER AND LOWER EYELIDS OF BOTH EYES, UNSPECIFIED TYPE: Status: ACTIVE | Noted: 2021-01-01

## 2021-04-08 NOTE — PROGRESS NOTES
[place] [facility]  Chief Complaint   Patient presents with     senior living Regulatory     [place] Medical Record Number:  [id number]  Place of Service where encounter took place:  [facility] () [[id number]]    HPI:    Rosie Guerrero  is 89 year old (5/25/1928), who is being seen today for a federally mandated E/M visit.  HPI information obtained from: facility chart records, facility staff and [place] Epic chart review. Today's concerns are:     Dementia without behavioral disturbance, unspecified dementia type (H)  History of CVA (cerebrovascular accident)  Hyperlipidemia, unspecified hyperlipidemia type  Stage 3a chronic kidney disease  Macular degeneration (senile) of retina  Arthritis  Osteoarthritis of multiple joints, unspecified osteoarthritis type      ALLERGIES:Lipitor [hmg-coa-r inhibitors], Penicillin [penicillins], and Sulfa drugs  PAST MEDICAL HISTORY:   has a past medical history of Generalized osteoarthrosis, unspecified site, Macular degeneration (senile) of retina, unspecified, Other and unspecified hyperlipidemia, Other malignant neoplasm of skin of trunk, except scrotum, Personal history of fall (11/2020), and Rubella without mention of complication.  PAST SURGICAL HISTORY:   has a past surgical history that includes hysterectomy, fuad (1970); REMOVE TONSILS/ADENOIDS,<13 Y/O (1934); COLONOSCOPY THRU STOMA, DIAGNOSTIC (1994); and flex sig (medicare pt.) (1996).  FAMILY HISTORY: family history includes Cerebrovascular Disease in her father; Neurologic Disorder in her mother.  SOCIAL HISTORY:  reports that she quit smoking about 47 years ago. She has never used smokeless tobacco. She reports current alcohol use. She reports that she does not use drugs.    MEDICATIONS:  Current Outpatient Medications   Medication Sig Dispense Refill     acetaminophen (TYLENOL) 325 MG tablet Take 650 mg by mouth 4 times daily as needed for mild pain       ascorbic acid  "(VITAMIN C) 500 MG tablet Take 500 mg by mouth daily  30 tablet      aspirin 81 MG EC tablet Take 1 tablet (81 mg) by mouth daily 90 tablet 3     calcium carbonate (OS-PATRICIA 500 MG [place]. [place]) 500 MG tablet Take 500 mg by mouth daily       carboxymethylcellulose PF (REFRESH PLUS) 0.5 % ophthalmic solution Place 1 drop Into the left eye Continue the 5 days QID, then decrease to TID X 5 days, then discontinue IF not resolved call NP.       coenzyme Q-10 10 MG CAPS Take 10 mg by mouth daily  30 capsule      multivitamin w/minerals (THERA-VIT-M) tablet Take 1 tablet by mouth daily       polyethylene glycol (MIRALAX) 17 g packet Take 17 g by mouth daily as needed for constipation 30 packet 3           Case Management:  I have reviewed the care plan and MDS and do agree with the plan. Patient's desire to return to the community is not assessible due to cognitive impairment. Information reviewed:  Medications, vital signs, orders, and nursing notes.    ROS:  Limited  secondary to cognitive impairment:   \"it is so cold today can I have a blanket?\" she denies CP, SOB, Cough, N/V. Says occas back pains, sleeping well. Per staff there are no issues.     Vitals:  /62   Pulse 66   Temp 97.5  F (36.4  C)   Resp 18   SpO2 96%   There is no height or weight on file to calculate BMI.  Exam:  GENERAL APPEARANCE:  in no distress, cooperative, sitting in chair  ENT:  Mouth with mist mucous membranes, [place]  RESP:  respiratory effort  of chest normal, lungs CTA  CV: Auscultation of heart done , regular rate and rhythm, no murmur, rub, or gallop, +1 bilat pedal to ankles' edema  ABDOMEN:  normal bowel sounds, soft,   M/S:   CANNON equally--seen in chair--and then up walking in room  SKIN:  Inspection of skin and subcutaneous tissue baseline bu limited as pt fully dressed  NEURO:   Cranial nerves grossly intact and at patient's baseline  PSYCH:  insight and judgement impaired, memory impaired , affect and mood normal      Lab/Diagnostic " data:   Recent Labs   Lab Test 11/02/20  1209 05/08/20  1136    141   POTASSIUM 4.6 4.5   CHLORIDE 110* 108   CO2 29 30   ANIONGAP 3 3   GLC 95 92   BUN 25 29   CR 0.96 1.28*   PATRICIA 9.7 10.2*     CBC RESULTS:   Recent Labs   Lab Test 11/02/20  1209   WBC 11.6*   RBC 4.01   HGB 12.5   HCT 38.0   MCV 95   MCH 31.2   MCHC 32.9   RDW 13.9        ASSESSMENT / PLAN:  (F03.90) Dementia without behavioral disturbance, unspecified dementia type (H)  (primary encounter diagnosis)  Comment/Plan:  needs 24 hr cares, no wander risk, monitor.    (Z86.73) History of CVA (cerebrovascular accident)   (E78.5) Hyperlipidemia, unspecified hyperlipidemia type  Comment/Plan: on asa, no other meds, no further neurological f/u needed.    (N18.31) Stage 3a chronic kidney disease  Comment/Plan: check BMP this month. SBP is running mostly 130-140's, will watch for need of HTN med    (H35.30) Macular degeneration (senile) of retina  Comment/Plan: cont eye vits, poor vision, fall risk, no recent falls monitor.    (M19.90) Arthritis   (M15.9) Osteoarthritis of multiple joints, unspecified osteoarthritis type  Comment/Plan: mild back discomfort today, cont with tylenol, calcium, and check Vit D as still ambulatory.  Consider discontinuation of coenzyme q10 and vitamin C      Electronically signed by:  ZAY Lafleur CNP

## 2021-04-12 NOTE — PROGRESS NOTES
"CC: Lab Recheck     HPI:    Rosie Guerrero is a 89 year old  (5/25/1928), who is being seen today for an episodic care visit at [facility]. Today's concern: lab recheck of CBC, BMP and Vitamin D Level.         OBJECTIVE: dozing in chair,  NAD.  No  New focal neurological deficits.    No issues per staff    /62   Pulse 66   Temp 97.3  F (36.3  C)   Resp 18   Ht 1.727 m (5' 8\")   Wt 44 kg (97 lb)   SpO2 94%   BMI 14.75 kg/m      LABS: today    Recent Labs   Lab Test 4/13/2021 11/02/20  1209 05/08/20  1136    142 141   POTASSIUM 4.6 4.6 4.5   CHLORIDE 105 110* 108   CO2 23 29 30   ANIONGAP 14 3 3    95 92   BUN 23 25 29   CR 1.28/gfr47 0.96 1.28*   PATRICIA 9.8 9.7 10.2*     CBC RESULTS:    Recent Labs   Lab Test 4/13/2021 11/02/20  1209   WBC 5.4 11.6*   RBC  4.01   HGB 12.1 12.5   HCT  38.0   MCV  95   MCH  31.2   MCHC  32.9   RDW  13.9   PLT 317K 263     4/13/2021:  VITAMIN D=34.7    ASSESSMENT / PLAN:  (N18.31) Stage 3a chronic kidney disease  (primary encounter diagnosis)  Comment/Plan: steady BMP, no changes, check in 6-12 mos or prn if condition warrants  Good Hgb also    (M15.9) Osteoarthritis of multiple joints, unspecified osteoarthritis type   (E55.9) Vitamin D deficiency  Comment/Plan: good level no changes, in on a MVI with minerals, Ca,         Electronically Signed by:    Deena Main RN, CNP      "

## 2021-04-13 PROBLEM — E55.9 VITAMIN D DEFICIENCY: Status: ACTIVE | Noted: 2021-01-01

## 2021-05-27 NOTE — PROGRESS NOTES
This patient's medication list and chart were reviewed as part of the service provided by [facility].    Assessment/Recommendations:  1. (Supplements):  Agree with NP to consider d'c of vitamin C and Co-Q10.  Likely not adding benefit and contributing to pill load.       Elif Maciel, Pharm.D.,[facility]  Board Certified Geriatric Pharmacist  Medication Therapy Management Pharmacist  [phone]

## 2021-06-04 PROBLEM — S72.002A CLOSED FRACTURE OF LEFT HIP, INITIAL ENCOUNTER (H): Status: ACTIVE | Noted: 2021-01-01

## 2021-06-04 NOTE — BRIEF OP NOTE
[facility]    Brief Operative Note    Pre-operative diagnosis: Hip fracture (H) [S72.009A]  Post-operative diagnosis Same as pre-operative diagnosis    Procedure: Procedure(s):  INTERNAL FIXATION, FRACTURE, TROCHANTERIC, HIP, USING PINS OR RODS  Surgeon: Surgeon(s) and Role:     * Tesfaye Muñoz MD - Primary     * Robert Escoto PA-C - Assisting  Anesthesia: Combined General with Block   Estimated blood loss: 300 ml  Drains: None  Specimens: * No specimens in log *  Findings:   None.  Complications: None.  Implants:   Implant Name Type Inv. Item Serial No.  Lot No. LRB No. Used Action   IMP NAIL FEMORAL SYN TFN [id number] [id number] 04.037.012S - [id number] [employer]/[place] IMP NAIL FEMORAL SYN TFN [id number] 125D 04.037.012S  [email or web address] [id number] Left 1 Implanted   IMP SCR SYN TFNA FENESTRATED LAG 85MM 04.038.185S - [id number] [employer]/[place] IMP SCR SYN TFNA FENESTRATED LAG 85MM 04.038.185S  [email or web address] [id number] Left 1 Implanted   IMP SCR SYN 5.0 TI LOCK T25 STARDRIVE 32MM 04.005.522S - [id number] [employer]/[place] IMP SCR SYN 5.0 TI LOCK T25 STARDRIVE 32MM 04.005.522S  [email or web address] [id number] Left 1 Implanted

## 2021-06-04 NOTE — ANESTHESIA PREPROCEDURE EVALUATION
Anesthesia Pre-Procedure Evaluation    Patient: Rosie Guerrero   MRN: [id number] : 1928        Preoperative Diagnosis: Hip fracture (H) [S72.009A]   Procedure : Procedure(s):  INTERNAL FIXATION, FRACTURE, TROCHANTERIC, HIP, USING PINS OR RODS     Past Medical History:   Diagnosis Date     Generalized osteoarthrosis, unspecified site     Hx of cervicalgia, and sciatica, resolved with PT     Macular degeneration (senile) of retina, unspecified      Other and unspecified hyperlipidemia      Other malignant neoplasm of skin of trunk, except scrotum     Multiple Basal cell CA excisions     Personal history of fall 2020     Rubella without mention of complication     Past history of Rubella      Past Surgical History:   Procedure Laterality Date     FLEX SIG (MEDICARE PT.)      NORMAL     HC COLONOSCOPY THRU STOMA, DIAGNOSTIC      NORMAL     HC REMOVE TONSILS/ADENOIDS,<13 Y/O       HYSTERECTOMY, KEMI      With BSO, secondary to fibroids      Allergies   Allergen Reactions     Lipitor [Hmg-Coa-R Inhibitors]      Severe leg cramps     Penicillin [Penicillins]      Sulfa Drugs       Social History     Tobacco Use     Smoking status: Former Smoker     Quit date: 1974     Years since quittin.4     Smokeless tobacco: Never Used   Substance Use Topics     Alcohol use: Yes     Alcohol/week: 0.0 standard drinks     Comment: occassionally      Wt Readings from Last 1 Encounters:   21 44 kg (97 lb)        Anesthesia Evaluation            ROS/MED HX  ENT/Pulmonary:    (-) asthma, COPD and sleep apnea   Neurologic:     (+) peripheral neuropathy, - sciatica . CVA,     Cardiovascular:     (+) Dyslipidemia hypertension--CAD ---    METS/Exercise Tolerance:     Hematologic:       Musculoskeletal:   (+) fracture, Fracture location: LLE,     GI/Hepatic:    (-) GERD   Renal/Genitourinary:     (+) renal disease,     Endo:       Psychiatric/Substance Use:       Infectious Disease:       Malignancy:        Other:      (+) , H/O Chronic Pain (chronic back pain),        Physical Exam    Airway        Mallampati: II   TM distance: > 3 FB   Neck ROM: full     Respiratory Devices and Support         Dental  no notable dental history         Cardiovascular   cardiovascular exam normal          Pulmonary           breath sounds clear to auscultation           OUTSIDE LABS:  CBC:   Lab Results   Component Value Date    WBC 12.3 (H) 06/03/2021    WBC 7.1 04/13/2021    HGB 10.6 (L) 06/03/2021    HGB 12.1 04/13/2021    HCT 33.9 (L) 06/03/2021    HCT 38.5 04/13/2021     06/03/2021     04/13/2021     BMP:   Lab Results   Component Value Date     06/03/2021     04/13/2021    POTASSIUM 4.7 06/04/2021    POTASSIUM 4.6 06/03/2021    CHLORIDE 108 06/03/2021    CHLORIDE 105 04/13/2021    CO2 26 06/03/2021    CO2 23 04/13/2021    BUN 25 06/03/2021    BUN 23 04/13/2021    CR 1.14 (H) 06/03/2021    CR 1.28 (A) 04/13/2021     (H) 06/03/2021     (A) 04/13/2021     COAGS:   Lab Results   Component Value Date    INR 0.99 06/03/2021     POC:   Lab Results   Component Value Date    BGM 88 01/10/2017     HEPATIC:   Lab Results   Component Value Date    ALBUMIN 3.1 (L) 11/02/2020    PROTTOTAL 6.6 (L) 11/02/2020    ALT 22 11/02/2020    AST 24 11/02/2020    ALKPHOS 78 11/02/2020    BILITOTAL 0.6 11/02/2020     OTHER:   Lab Results   Component Value Date    LACT 1.0 11/02/2020    A1C 5.5 01/09/2017    PATRICIA 9.5 06/03/2021    TSH 2.61 01/09/2017    CRP <2.9 01/09/2017       Anesthesia Plan    ASA Status:  3      Anesthesia Type: General.     - Airway: ETT              Consents    Anesthesia Plan(s) and associated risks, benefits, and realistic alternatives discussed. Questions answered and patient/representative(s) expressed understanding.     - Discussed with:  Patient         Postoperative Care       PONV prophylaxis: Ondansetron (or other 5HT-3), Background Propofol Infusion, Dexamethasone or Solumedrol      Comments:    R/b/a discussed spinal vs GA.  Patient requesting GA because of history of back issues.             Kinza Santoro

## 2021-06-04 NOTE — ANESTHESIA PROCEDURE NOTES
Airway       Patient location during procedure: OR ([facility] - Operating Room or Procedural Area)       Procedure Start/Stop Times: 6/4/2021 1:42 PM  Staff -        CRNA: Erna Richards APRN CRNA       Performed By: CRNA  Consent for Airway        Urgency: emergent  Indications and Patient Condition       Indications for airway management: chris-procedural       Induction type:intravenous       Mask difficulty assessment: 1 - vent by mask    Final Airway Details       Final airway type: endotracheal airway       Successful airway: ETT - single and Oral  Endotracheal Airway Details        ETT size (mm): 7.0       Successful intubation technique: direct laryngoscopy       DL Blade Type: MAC 3       Grade View of Cords: 2       Adjucts: stylet       Position: Right       Measured from: lips       Secured at (cm): 22       Bite block used: None    Post intubation assessment        Number of attempts at approach: 1       Number of other approaches attempted: 0       Secured with: pink tape       Ease of procedure: easy       Dentition: Intact and Unchanged    Medication(s) Administered   Medication Administration Time: 6/4/2021 1:42 PM

## 2021-06-04 NOTE — ANESTHESIA PREPROCEDURE EVALUATION
Anesthesia Pre-Procedure Evaluation    Patient: Rosie Guerrero   MRN: [id number] : 1928        Preoperative Diagnosis: Hip fracture (H) [S72.009A]   Procedure : Procedure(s):  INTERNAL FIXATION, FRACTURE, TROCHANTERIC, HIP, USING PINS OR RODS     Past Medical History:   Diagnosis Date     Generalized osteoarthrosis, unspecified site     Hx of cervicalgia, and sciatica, resolved with PT     Macular degeneration (senile) of retina, unspecified      Other and unspecified hyperlipidemia      Other malignant neoplasm of skin of trunk, except scrotum     Multiple Basal cell CA excisions     Personal history of fall 2020     Rubella without mention of complication     Past history of Rubella      Past Surgical History:   Procedure Laterality Date     FLEX SIG (MEDICARE PT.)      NORMAL     HC COLONOSCOPY THRU STOMA, DIAGNOSTIC      NORMAL     HC REMOVE TONSILS/ADENOIDS,<13 Y/O       HYSTERECTOMY, KEMI      With BSO, secondary to fibroids      Allergies   Allergen Reactions     Lipitor [Hmg-Coa-R Inhibitors]      Severe leg cramps     Penicillin [Penicillins]      Sulfa Drugs       Social History     Tobacco Use     Smoking status: Former Smoker     Quit date: 1974     Years since quittin.4     Smokeless tobacco: Never Used   Substance Use Topics     Alcohol use: Yes     Alcohol/week: 0.0 standard drinks     Comment: occassionally      Wt Readings from Last 1 Encounters:   21 44 kg (97 lb)        Anesthesia Evaluation   Pt has had prior anesthetic.     No history of anesthetic complications       ROS/MED HX  ENT/Pulmonary: Comment: Macular degeneration (senile) of retina    (+) tobacco use, Past use,     Neurologic:     (+) delerium, CVA, TIA,     Cardiovascular:     (+) Dyslipidemia hypertension-----    METS/Exercise Tolerance:     Hematologic:       Musculoskeletal:   (+) fracture, Fracture location: LLE (left intertrochanteric femur fracture),     GI/Hepatic:        Renal/Genitourinary:     (+) renal disease,     Endo:       Psychiatric/Substance Use:       Infectious Disease:       Malignancy:   (+) Malignancy, History of Skin.Skin CA Active status post.        Other:            Physical Exam    Airway        Mallampati: II   TM distance: > 3 FB   Neck ROM: full   Mouth opening: > 3 cm    Respiratory Devices and Support         Dental  no notable dental history         Cardiovascular   cardiovascular exam normal          Pulmonary   pulmonary exam normal                OUTSIDE LABS:  CBC:   Lab Results   Component Value Date    WBC 12.3 (H) 06/03/2021    WBC 7.1 04/13/2021    HGB 10.6 (L) 06/03/2021    HGB 12.1 04/13/2021    HCT 33.9 (L) 06/03/2021    HCT 38.5 04/13/2021     06/03/2021     04/13/2021     BMP:   Lab Results   Component Value Date     06/03/2021     04/13/2021    POTASSIUM 4.7 06/04/2021    POTASSIUM 4.6 06/03/2021    CHLORIDE 108 06/03/2021    CHLORIDE 105 04/13/2021    CO2 26 06/03/2021    CO2 23 04/13/2021    BUN 25 06/03/2021    BUN 23 04/13/2021    CR 1.14 (H) 06/03/2021    CR 1.28 (A) 04/13/2021     (H) 06/03/2021     (A) 04/13/2021     COAGS:   Lab Results   Component Value Date    INR 0.99 06/03/2021     POC:   Lab Results   Component Value Date    BGM 88 01/10/2017     HEPATIC:   Lab Results   Component Value Date    ALBUMIN 3.1 (L) 11/02/2020    PROTTOTAL 6.6 (L) 11/02/2020    ALT 22 11/02/2020    AST 24 11/02/2020    ALKPHOS 78 11/02/2020    BILITOTAL 0.6 11/02/2020     OTHER:   Lab Results   Component Value Date    LACT 1.0 11/02/2020    A1C 5.5 01/09/2017    PATRICIA 9.5 06/03/2021    TSH 2.61 01/09/2017    CRP <2.9 01/09/2017       Anesthesia Plan    ASA Status:  3, emergent    NPO Status:  NPO Appropriate    Anesthesia Type: General.   Induction: Intravenous, Propofol.   Maintenance: Balanced.        Consents    Anesthesia Plan(s) and associated risks, benefits, and realistic alternatives discussed. Questions  answered and patient/representative(s) expressed understanding.     - Discussed with:  Patient      - Extended Intubation/Ventilatory Support Discussed: No.      - Patient is DNR/DNI Status: No    Use of blood products discussed: Yes.     - Discussed with: Legal guardian, Healthcare Power of .      - Refused: Blood products refused      Postoperative Care    Pain management: IV analgesics, Multi-modal analgesia, Peripheral nerve block (Single Shot).   PONV prophylaxis: Ondansetron (or other 5HT-3), Dexamethasone or Solumedrol     Comments:    GA with fascia iliaca following induction            Charlie Palumbo DO

## 2021-06-04 NOTE — ED NOTES
[facility]  ED Nurse Handoff Report    ED Chief complaint: Leg Injury      ED Diagnosis:   Final diagnoses:   None       Code Status: DNR    Allergies:   Allergies   Allergen Reactions     Lipitor [Hmg-Coa-R Inhibitors]      Severe leg cramps     Penicillin [Penicillins]      Sulfa Drugs        Patient Story: hip fx  Focused Assessment:  same    Treatments and/or interventions provided: scans, purewyk  Patient's response to treatments and/or interventions: resting    To be done/followed up on inpatient unit:  na    Does this patient have any cognitive concerns?: Baseline dementia    Activity level - Baseline/Home:  Stand with Assist  Activity Level - Current:   Total Care    Patient's Preferred language: English   Needed?: No    Isolation: None  Infection: Not Applicable  Patient tested for COVID 19 prior to admission: YES  Bariatric?: No    Vital Signs:   Vitals:    06/04/21 0055 06/04/21 0056 06/04/21 0058 06/04/21 0059   BP:  (!) 181/82     Pulse:  79     SpO2: 96% 96% 96% 96%       Cardiac Rhythm:     Was the PSS-3 completed:   No -dementia  What interventions are required if any?               Family Comments: poa/ HCP kalie tobin [phone], call with any information or updates  OBS brochure/video discussed/provided to patient/family: N/A              Name of person given brochure if not patient:               Relationship to patient:     For the majority of the shift this patient's behavior was Green.   Behavioral interventions performed were .    ED NURSE PHONE NUMBER: [id number]

## 2021-06-04 NOTE — ANESTHESIA CARE TRANSFER NOTE
Patient: Rosie Guerrero    Procedure(s):  INTERNAL FIXATION, FRACTURE, TROCHANTERIC, HIP, USING PINS OR RODS    Diagnosis: Hip fracture (H) [S72.009A]  Diagnosis Additional Information: No value filed.    Anesthesia Type:   General     Note:    Oropharynx: oropharynx clear of all foreign objects and spontaneously breathing  Level of Consciousness: drowsy  Oxygen Supplementation: face mask  Level of Supplemental Oxygen (L/min / FiO2): 6  Independent Airway: airway patency satisfactory and stable  Dentition: dentition unchanged  Vital Signs Stable: post-procedure vital signs reviewed and stable  Report to RN Given: handoff report given  Patient transferred to: PACU    Handoff Report: Identifed the Patient, Identified the Reponsible Provider, Reviewed the pertinent medical history, Discussed the surgical course, Reviewed Intra-OP anesthesia mangement and issues during anesthesia, Set expectations for post-procedure period and Allowed opportunity for questions and acknowledgement of understanding      Vitals: (Last set prior to Anesthesia Care Transfer)  CRNA VITALS  6/4/2021 1440 - 6/4/2021 1520      6/4/2021             Pulse:  70    SpO2:  100 %    Resp Rate (set):  10        Electronically Signed By: ZAY Hare CRNA  June 4, 2021  3:20 PM

## 2021-06-04 NOTE — CONSULTS
[facility]    Orthopedic Consultation    Rosie Guerrero MRN# [id number]   Age: 89 year old YOB: 1928     Date of Admission:  6/3/2021    Reason for consult: Left hip fracture       Requesting physician: Dr. Blanc       Level of consult: Consult, follow and place orders           Assessment and Plan:   Assessment:   Left intertrochanteric femur fracture, fall from standing  Osteoporosis      Plan:   OR today 6/4 for left intertrochanteric femur fracture  ORIF with Dr. Muñoz  NPO   Bed rest  Prefer pure wick over hooks if at all possible  Pain medication as needed, limit narcotics as able  No PTA anticoagulation, continue without  STAT COVID test  Type and cross  Vit D lab  Pre-op optimization per hospitalist           Chief Complaint:   Left hip pain         History of Present Illness:   This patient is a 89 year old female who presents with the following condition requiring a hospital admission:    Per ED note, patient baseline dementia  Per EMS, the patient reportedly had a witnessed fall in her care facility this evening. The facility reported to EMS that the patient did not hit or head or lose consciousness. The patient arrives with a bruise and small laceration to her left forehead. The facility did an x-ray that demonstrated left hip fracture, so she was sent to the ED for further evaluation.  She takes ASA daily          Past Medical History:     Past Medical History:   Diagnosis Date     Generalized osteoarthrosis, unspecified site     Hx of cervicalgia, and sciatica, resolved with PT     Macular degeneration (senile) of retina, unspecified      Other and unspecified hyperlipidemia      Other malignant neoplasm of skin of trunk, except scrotum     Multiple Basal cell CA excisions     Personal history of fall 11/2020     Rubella without mention of complication     Past history of Rubella             Past Surgical History:     Past Surgical History:   Procedure  Laterality Date     FLEX SIG (MEDICARE PT.)      NORMAL     HC COLONOSCOPY THRU STOMA, DIAGNOSTIC      NORMAL     HC REMOVE TONSILS/ADENOIDS,<11 Y/O  1934     HYSTERECTOMY, KEMI  1970    With BSO, secondary to fibroids             Social History:     Social History     Tobacco Use     Smoking status: Former Smoker     Quit date: 1974     Years since quittin.4     Smokeless tobacco: Never Used   Substance Use Topics     Alcohol use: Yes     Alcohol/week: 0.0 standard drinks     Comment: occassionally             Family History:     Family History   Problem Relation Age of Onset     Neurologic Disorder Mother         Cerebrovascular disease, dementia,  age 97     Cerebrovascular Disease Father          age 79             Immunizations:     VACCINE/DOSE   Diptheria   DPT   DTAP   HBIG   Hepatitis A   Hepatitis B   HIB   Influenza   Measles   Meningococcal   MMR   Mumps   Pneumococcal   Polio   Rubella   Small Pox   TDAP   Varicella   Zoster             Allergies:     Allergies   Allergen Reactions     Lipitor [Hmg-Coa-R Inhibitors]      Severe leg cramps     Penicillin [Penicillins]      Sulfa Drugs              Medications:     Current Facility-Administered Medications   Medication     [Auto Hold] acetaminophen (TYLENOL) tablet 650 mg     ceFAZolin (ANCEF) intermittent infusion 2 g in 100 mL dextrose PRE-MIX     ceFAZolin (ANCEF) intermittent infusion 2 g in 100 mL dextrose PRE-MIX     [Auto Hold] hydrALAZINE (APRESOLINE) injection 10 mg     [Auto Hold] HYDROmorphone (DILAUDID) injection 0.2 mg     [Auto Hold] labetalol (NORMODYNE/TRANDATE) injection 10 mg     lactated ringers infusion     [Auto Hold] lidocaine (LMX4) cream     [Auto Hold] lidocaine 1 % 0.1-1 mL     lidocaine 1 % 0.1-1 mL     [Auto Hold] magnesium hydroxide (MILK OF MAGNESIA) suspension 30 mL     [Auto Hold] melatonin tablet 1 mg     [Auto Hold] naloxone (NARCAN) injection 0.2 mg    Or     [Auto Hold] naloxone (NARCAN)  injection 0.4 mg    Or     [Auto Hold] naloxone (NARCAN) injection 0.2 mg    Or     [Auto Hold] naloxone (NARCAN) injection 0.4 mg     [Auto Hold] ondansetron (ZOFRAN-ODT) ODT tab 4 mg    Or     [Auto Hold] ondansetron (ZOFRAN) injection 4 mg     [Auto Hold] senna-docusate (SENOKOT-S/PERICOLACE) 8.6-50 MG per tablet 1 tablet    Or     [Auto Hold] senna-docusate (SENOKOT-S/PERICOLACE) 8.6-50 MG per tablet 2 tablet     [Auto Hold] sodium chloride (PF) 0.9% PF flush 3 mL     [Auto Hold] sodium chloride (PF) 0.9% PF flush 3 mL     sodium chloride (PF) 0.9% PF flush 3 mL     sodium chloride (PF) 0.9% PF flush 3 mL     sodium chloride 0.9% infusion     tranexamic acid 1 g in 100 mL 0.7% NaCl IV bag (premix)     tranexamic acid 1 g in 100 mL 0.7% NaCl IV bag (premix)             Review of Systems:   CV: NEGATIVE for chest pain, palpitations or peripheral edema  C: NEGATIVE for fever, chills, change in weight  E/M: NEGATIVE for ear, mouth and throat problems  R: NEGATIVE for significant cough or SOB          Physical Exam:   All vitals have been reviewed  Patient Vitals for the past 24 hrs:   BP Temp Temp src Pulse Resp SpO2   06/04/21 1144 (!) 166/100 99.4  F (37.4  C) Temporal -- 15 94 %   06/04/21 1100 -- -- -- -- 16 --   06/04/21 0853 (!) 166/71 -- -- 71 -- --   06/04/21 0815 -- -- -- -- 18 --   06/04/21 0743 (!) 184/77 96.9  F (36.1  C) Axillary 81 16 97 %   06/04/21 0350 (!) 169/77 97.2  F (36.2  C) Axillary 79 16 96 %   06/04/21 0059 -- -- -- -- -- 96 %   06/04/21 0058 -- -- -- -- -- 96 %   06/04/21 0056 (!) 181/82 -- -- 79 -- 96 %   06/04/21 0055 -- -- -- -- -- 96 %   06/04/21 0054 -- -- -- -- -- 96 %   06/04/21 0052 -- -- -- -- -- 96 %   06/04/21 0051 -- -- -- -- -- 96 %   06/04/21 0050 -- -- -- -- -- 95 %   06/04/21 0048 -- -- -- -- -- 95 %   06/04/21 0045 -- -- -- -- -- 96 %   06/03/21 2342 -- -- -- -- -- 98 %   06/03/21 2341 -- -- -- -- -- 98 %   06/03/21 2340 -- -- -- -- -- 98 %   06/03/21 2338 -- -- -- -- --  98 %   06/03/21 2337 -- -- -- -- -- 99 %   06/03/21 2336 -- -- -- -- -- 99 %   06/03/21 2335 -- -- -- -- -- 99 %   06/03/21 2334 -- -- -- -- -- 99 %   06/03/21 2330 -- -- -- -- -- 99 %   06/03/21 2328 -- -- -- -- -- 99 %   06/03/21 2326 -- -- -- -- -- 98 %       Intake/Output Summary (Last 24 hours) at 6/4/2021 1258  Last data filed at 6/4/2021 1130  Gross per 24 hour   Intake --   Output 600 ml   Net -600 ml     Patient resting comfortably in bed  Left LE shortened and internally rotated  Bilateral calves are soft, non-tender.  Bilateral lower extremity is NVI.  Sensation intact bilateral lower extremities  Active dorsi and plantar flexion bilaterally  +Dp pulse            Data:   All laboratory data reviewed  Results for orders placed or performed during the hospital encounter of 06/03/21   Head CT w/o contrast     Status: None    Narrative    EXAM: CT HEAD W/O CONTRAST  LOCATION: [facility]  DATE/TIME: 6/4/2021 12:35 AM    INDICATION: Traumatic injury. Pain. Dementia.  COMPARISON: 11/02/2020  TECHNIQUE: Routine CT Head without IV contrast. Multiplanar reformats. Dose reduction techniques were used.    FINDINGS:  INTRACRANIAL CONTENTS: No intracranial hemorrhage, extraaxial collection, or mass effect.  No CT evidence of acute infarct. Severe presumed chronic small vessel ischemic changes. Chronic infarct right parietal lobe. Moderate generalized volume loss. No   hydrocephalus.     VISUALIZED ORBITS/SINUSES/MASTOIDS: No intraorbital abnormality. No paranasal sinus mucosal disease. No middle ear or mastoid effusion.    BONES/SOFT TISSUES: No acute abnormality.      Impression    IMPRESSION:  1.  No acute intracranial process.   XR Wrist Right G/E 3 Views     Status: None    Narrative    EXAM: XR WRIST RT G/E 3 VW  LOCATION: [facility]  DATE/TIME: 6/4/2021 12:21 AM    INDICATION: Pain swelling  COMPARISON: None.      Impression    IMPRESSION: No fractures identified. All views of the hand  in slight radial deviation which is suboptimal for evaluating the navicular bone. No fractures are suggested but if there is suspicion for navicular fracture suggested dedicated navicular view.    Degenerative changes at base of thumb, first CMC joint. Chondrocalcinosis involving the TFCC.   XR Pelvis w Hip Left 1 View     Status: None    Narrative    EXAM: XR PELVIS AND HIP LEFT 1 VIEW  LOCATION: [facility]  DATE/TIME: 6/4/2021 12:21 AM    INDICATION: Pain swelling  COMPARISON: 11/2/2020      Impression    IMPRESSION: Comminuted intertrochanteric fracture of left hip. There is 90 degrees of varus angulation at the fracture site. The greater and lesser trochanters may be a separate fracture fragment.   CBC with platelets differential     Status: Abnormal   Result Value Ref Range    WBC 12.3 (H) 4.0 - 11.0 10e9/L    RBC Count 3.56 (L) 3.8 - 5.2 10e12/L    Hemoglobin 10.6 (L) 11.7 - 15.7 g/dL    Hematocrit 33.9 (L) 35.0 - 47.0 %    MCV 95 78 - 100 fl    MCH 29.8 26.5 - 33.0 pg    MCHC 31.3 (L) 31.5 - 36.5 g/dL    RDW 13.5 10.0 - 15.0 %    Platelet Count 236 150 - 450 10e9/L    Diff Method Automated Method     % Neutrophils 86.6 %    % Lymphocytes 6.6 %    % Monocytes 5.8 %    % Eosinophils 0.2 %    % Basophils 0.5 %    % Immature Granulocytes 0.3 %    Nucleated RBCs 0 0 /100    Absolute Neutrophil 10.7 (H) 1.6 - 8.3 10e9/L    Absolute Lymphocytes 0.8 0.8 - 5.3 10e9/L    Absolute Monocytes 0.7 0.0 - 1.3 10e9/L    Absolute Eosinophils 0.0 0.0 - 0.7 10e9/L    Absolute Basophils 0.1 0.0 - 0.2 10e9/L    Abs Immature Granulocytes 0.0 0 - 0.4 10e9/L    Absolute Nucleated RBC 0.0    Basic metabolic panel     Status: Abnormal   Result Value Ref Range    Sodium 139 133 - 144 mmol/L    Potassium 4.6 3.4 - 5.3 mmol/L    Chloride 108 94 - 109 mmol/L    Carbon Dioxide 26 20 - 32 mmol/L    Anion Gap 5 3 - 14 mmol/L    Glucose 139 (H) 70 - 99 mg/dL    Urea Nitrogen 25 7 - 30 mg/dL    Creatinine 1.14 (H) 0.52 - 1.04  mg/dL    GFR Estimate 41 (L) >60 mL/min/[1.73_m2]    GFR Estimate If Black 48 (L) >60 mL/min/[1.73_m2]    Calcium 9.5 8.5 - 10.1 mg/dL   Troponin I     Status: None   Result Value Ref Range    Troponin I ES <0.015 0.000 - 0.045 ug/L   UA with Microscopic     Status: Abnormal   Result Value Ref Range    Color Urine Light Yellow     Appearance Urine Slightly Cloudy     Glucose Urine Negative NEG^Negative mg/dL    Bilirubin Urine Negative NEG^Negative    Ketones Urine Negative NEG^Negative mg/dL    Specific Gravity Urine 1.016 1.003 - 1.035    Blood Urine Negative NEG^Negative    pH Urine 8.0 (H) 5.0 - 7.0 pH    Protein Albumin Urine Negative NEG^Negative mg/dL    Urobilinogen mg/dL 0.0 0.0 - 2.0 mg/dL    Nitrite Urine Negative NEG^Negative    Leukocyte Esterase Urine Negative NEG^Negative    Source Midstream Urine     WBC Urine 4 0 - 5 /HPF    RBC Urine 2 0 - 2 /HPF    Squamous Epithelial /HPF Urine <1 0 - 1 /HPF   INR     Status: None   Result Value Ref Range    INR 0.99 0.86 - 1.14   Asymptomatic SARS-CoV-2 COVID-19 Virus (Coronavirus) by PCR     Status: None    Specimen: Nasopharyngeal   Result Value Ref Range    SARS-CoV-2 Virus Specimen Source Nasopharyngeal     SARS-CoV-2 PCR Result NEGATIVE     SARS-CoV-2 PCR Comment (Note)    Potassium     Status: None   Result Value Ref Range    Potassium 4.7 3.4 - 5.3 mmol/L   EKG 12-lead, tracing only     Status: None (Preliminary result)   Result Value Ref Range    Interpretation ECG Click View Image link to view waveform and result    ABO/Rh type and screen     Status: None   Result Value Ref Range    ABO A     RH(D) Pos     Antibody Screen Neg     Test Valid Only At [facility]        Specimen Expires 06/06/2021           Attestation:  I have reviewed today's vital signs, notes, medications, labs and imaging with Dr. Muñoz.  Amount of time performed on this consult: 30 minutes.    Breonna Henson PA-C

## 2021-06-04 NOTE — PLAN OF CARE
"Patient arrived to unit at 0300 from ED. She presented to the ED with a fall that took place at her living facility. Patient is disoriented to place/situation/time. Complains of pain. Repeatedly states \"I hurt\". PRN dilaudid given x 1. NPO- except ice chips/med. Strict bedrest. VSS on RA except hypertensive @ times.  Incontinent. Purewick in place- adequate UOP. Skin intact- scattered bruising. Mostly arms d/t IV access. Consults: Ortho  "

## 2021-06-04 NOTE — TELEPHONE ENCOUNTER
1725:  CC and HPI:  Nurse called at 1725 that pt fell at ~~1645 and C/o left hip pain.  Pt does fall at times but has not had pain before.  I ordered an xray and asked nurse to call the on call with the report.  2200: I noted on my Email the report that pt has broken left hip--  IMPRESSION:  Angulated displaced subcapital fracture on the left.  2205: I called the unit, nurse said he had not yet called the on call but had the report now.     I gave order to send pt to ER, and to update contact.     I called and updated on call, Dr Katina Treadwell of above..

## 2021-06-04 NOTE — H&P
[facility]    History and Physical  Hospitalist       Date of Admission:  6/3/2021  Date of Service (when I saw the patient): 06/04/21    Assessment & Plan   Rosie Guerrero is a 89 year old female who presents with a fall    Note: Patient has advanced dementia and history is not obtainable    R intertrochanteric fracture of L hip  With fall at facility evening of presentation. No reported head trauma or LOC, unclear if witnessed.  CT head negative. XR pelvis with comminuted intertrochanteric fracture of the L hip. R wrist imaging without fracture.  - orthopedic surgery consult  - NPO, IV fluids  - prn hydromorphone for pain    Leukocytosis  WBC 12.3. UA bland. Afebrile. Likely reactive.  - repeat labs in am    Dementia  Lives at [facility].   - Re-orient as needed  - Maintain normal day/night, sleep wake cycles  - Minimize sedating/altering medications as able    HTN  HLD  /82 on presentation   - prn hydralazine, labetalol available    H/o CVA  [reportedly aspirin]  - hold aspirin for now    CKD  4/2021 creatinine 1.28. on presentation at 1.14  - avoid nephrotoxins  - monitor creatinine  - IV fluids    COVID-19 negative on admission    DVT Prophylaxis: Pneumatic Compression Devices  Code Status: DNR    Disposition: Expected discharge in 3-4 days     Genaro Blanc MD  [phone] (P)  Text Page     Primary Care Physician   Gwendolyn Segura    Chief Complaint   Fall with hip pain    History is obtained from the medical records    History of Present Illness   Rosie Guerrero is a 89 year old female who presents with hip pain after fall.  She lives in a care facility and apparently had a fall.  Is unclear if it was witnessed or not.  After this she was complaining of hip pain so imaging was performed at the facility.  She was found to have a hip fracture.  Here she is doing okay.  She is not able to provide any history given her dementia.    Past Medical History    I have reviewed this  patient's medical history and updated it with pertinent information if needed.   Past Medical History:   Diagnosis Date     Generalized osteoarthrosis, unspecified site     Hx of cervicalgia, and sciatica, resolved with PT     Macular degeneration (senile) of retina, unspecified      Other and unspecified hyperlipidemia      Other malignant neoplasm of skin of trunk, except scrotum     Multiple Basal cell CA excisions     Personal history of fall 11/2020     Rubella without mention of complication     Past history of Rubella       Past Surgical History   I have reviewed this patient's surgical history and updated it with pertinent information if needed.  Past Surgical History:   Procedure Laterality Date     FLEX SIG (MEDICARE PT.)  1996    NORMAL     HC COLONOSCOPY THRU STOMA, DIAGNOSTIC  1994    NORMAL     HC REMOVE TONSILS/ADENOIDS,<11 Y/O  1934     HYSTERECTOMY, KEMI  1970    With BSO, secondary to fibroids       Prior to Admission Medications   Prior to Admission Medications   Prescriptions Last Dose Informant Patient Reported? Taking?   acetaminophen (TYLENOL) 325 MG tablet   Yes No   Sig: Take 650 mg by mouth 4 times daily as needed for mild pain   ascorbic acid (VITAMIN C) 500 MG tablet  Nursing Home Yes No   Sig: Take 500 mg by mouth daily    aspirin 81 MG EC tablet  Nursing Home No No   Sig: Take 1 tablet (81 mg) by mouth daily   calcium carbonate (OS-PATRICIA 500 MG [place]. [place]) 500 MG tablet  Nursing Home Yes No   Sig: Take 500 mg by mouth daily   carboxymethylcellulose PF (REFRESH PLUS) 0.5 % ophthalmic solution   Yes No   Sig: Place 1 drop Into the left eye Continue the 5 days QID, then decrease to TID X 5 days, then discontinue IF not resolved call NP.   coenzyme Q-10 10 MG CAPS  Nursing Home Yes No   Sig: Take 10 mg by mouth daily    multivitamin w/minerals (THERA-VIT-M) tablet  Nursing Home Yes No   Sig: Take 1 tablet by mouth daily   polyethylene glycol (MIRALAX) 17 g packet  Nursing Home No No   Sig: Take  17 g by mouth daily as needed for constipation      Facility-Administered Medications: None     Allergies   Allergies   Allergen Reactions     Lipitor [Hmg-Coa-R Inhibitors]      Severe leg cramps     Penicillin [Penicillins]      Sulfa Drugs        Social History   I have reviewed this patient's social history and updated it with pertinent information if needed. Rosie Guerrero  reports that she quit smoking about 47 years ago. She has never used smokeless tobacco. She reports current alcohol use. She reports that she does not use drugs.    Family History   I have reviewed this patient's family history and updated it with pertinent information if needed.   Family History   Problem Relation Age of Onset     Neurologic Disorder Mother         Cerebrovascular disease, dementia,  age 97     Cerebrovascular Disease Father          age 79       Review of Systems   The 10 point Review of Systems is unable to be performed 2/2 pt factors    Physical Exam       BP: (!) 181/82 Pulse: 79     SpO2: 96 %      Vital Signs with Ranges  0 lbs 0 oz    Constitutional: alert, not oriented  Eyes: EOMI, PERRL  HEENT: unable to examine  Respiratory: CTA B, limited effort  Cardiovascular: RRR no murmur.  No edema.  GI: soft, nontender, nondistended, BS   Lymph/Hematologic: no cervical LAD  Genitourinary: deferred  Skin: no rashes or lesions grossly  Musculoskeletal: no deformities or arthritis  Neurologic: unable to assess  Psychiatric: advanced dementia    Data   Data reviewed today:  I personally reviewed the head CT image(s) showing no acute process and the XR hip image(s) showing fracture.  Recent Labs   Lab 21  2331   WBC 12.3*   HGB 10.6*   MCV 95      INR 0.99      POTASSIUM 4.6   CHLORIDE 108   CO2 26   BUN 25   CR 1.14*   ANIONGAP 5   PATRICIA 9.5   *       Recent Results (from the past 24 hour(s))   Head CT w/o contrast    Narrative    EXAM: CT HEAD W/O CONTRAST  LOCATION: [facility]  DATE/TIME: 6/4/2021 12:35 AM    INDICATION: Traumatic injury. Pain. Dementia.  COMPARISON: 11/02/2020  TECHNIQUE: Routine CT Head without IV contrast. Multiplanar reformats. Dose reduction techniques were used.    FINDINGS:  INTRACRANIAL CONTENTS: No intracranial hemorrhage, extraaxial collection, or mass effect.  No CT evidence of acute infarct. Severe presumed chronic small vessel ischemic changes. Chronic infarct right parietal lobe. Moderate generalized volume loss. No   hydrocephalus.     VISUALIZED ORBITS/SINUSES/MASTOIDS: No intraorbital abnormality. No paranasal sinus mucosal disease. No middle ear or mastoid effusion.    BONES/SOFT TISSUES: No acute abnormality.      Impression    IMPRESSION:  1.  No acute intracranial process.   XR Pelvis w Hip Left 1 View    Narrative    EXAM: XR PELVIS AND HIP LEFT 1 VIEW  LOCATION: [facility]  DATE/TIME: 6/4/2021 12:21 AM    INDICATION: Pain swelling  COMPARISON: 11/2/2020      Impression    IMPRESSION: Comminuted intertrochanteric fracture of left hip. There is 90 degrees of varus angulation at the fracture site. The greater and lesser trochanters may be a separate fracture fragment.   XR Wrist Right G/E 3 Views    Narrative    EXAM: XR WRIST RT G/E 3 VW  LOCATION: [facility]  DATE/TIME: 6/4/2021 12:21 AM    INDICATION: Pain swelling  COMPARISON: None.      Impression    IMPRESSION: No fractures identified. All views of the hand in slight radial deviation which is suboptimal for evaluating the navicular bone. No fractures are suggested but if there is suspicion for navicular fracture suggested dedicated navicular view.    Degenerative changes at base of thumb, first CMC joint. Chondrocalcinosis involving the TFCC.

## 2021-06-04 NOTE — PROGRESS NOTES
RECEIVING UNIT ED HANDOFF REVIEW    ED Nurse Handoff Report was reviewed by: Ami Thornton RN on June 4, 2021 at 2:08 AM

## 2021-06-04 NOTE — ED TRIAGE NOTES
Pt arrives via EMS from LTC facility. Facility staff report a fall from standing height at 1600. XR obtained at facility. XR results show Left hip fracture. Staff denied hitting head or LOC. Pt has new contusion & laceration near left eye and bruising on left wrist on arrival. EMS administered 100 mcg fentanyl. Pt comfortable on arrival. VS WNL. CMS intact to b/l LE. Takes daily 81 mg asa per paperwork

## 2021-06-04 NOTE — ED PROVIDER NOTES
History   Chief Complaint:  Leg Injury       HPI   Rosie Guerrero is a 89 year old female with history of dementia, CVA, arthritis, and chronic kidney disease who presents with leg injury. Per EMS, the patient reportedly had a witnessed fall in her care facility this evening. The facility reported to EMS that the patient did not hit or head or lose consciousness. The patient arrives with a bruise and small laceration to her left forehead. The facility did an x-ray that demonstrated left hip fracture, so she was sent to the ED for further evaluation. She received 100 mcg Fentanyl en route. The patient says she has pain to her left hip but denies any other pains, including neck pain.    Review of Systems   Musculoskeletal: Positive for arthralgias (left hip). Negative for neck pain.   Skin: Positive for wound (left forehead).   10 systems reviewed and negative except as above and in HPI.      Allergies:  Lipitor  Penicillins  Sulfa Drugs      Medications:  Aspirin 81  Coenzyme Q-10      Past Medical History:    Generalized osteoarthrosis  Macular degeneration (senile) of retina  Hyperlipidemia  Malignant neoplasm of skin of trunk  Rubella  Vitamin D deficiency  Blepharitis of upper and lower eyelids of both eyes  Arthritis  History of CVA  Chronic kidney disease, stage 3  Sequelae of cerebral infarction  Dementia without behavioral disturbance       Past Surgical History:    Remove tonsils/adenoids  Hysterectomy, KEMI with BSO       Family History:    Dementia      Social History:  Arrives via EMS.  Lives in care facility.    Physical Exam     Patient Vitals for the past 24 hrs:   BP Pulse SpO2   06/04/21 0059 -- -- 96 %   06/04/21 0058 -- -- 96 %   06/04/21 0056 (!) 181/82 79 96 %   06/04/21 0055 -- -- 96 %   06/04/21 0054 -- -- 96 %   06/04/21 0052 -- -- 96 %   06/04/21 0051 -- -- 96 %   06/04/21 0050 -- -- 95 %   06/04/21 0048 -- -- 95 %   06/04/21 0045 -- -- 96 %   06/03/21 2342 -- -- 98 %   06/03/21 2341 -- -- 98  %   06/03/21 2340 -- -- 98 %   06/03/21 2338 -- -- 98 %   06/03/21 2337 -- -- 99 %   06/03/21 2336 -- -- 99 %   06/03/21 2335 -- -- 99 %   06/03/21 2334 -- -- 99 %   06/03/21 2330 -- -- 99 %   06/03/21 2328 -- -- 99 %   06/03/21 2326 -- -- 98 %       Physical Exam    General: Resting on the gurney, appears  uncomfortable  Head:  0.3 cm laceration lateral to the left eye.  Mouth/Throat: Mucus membranes are moist  CV:  Regular rate    Normal S1 and S2  No pathological murmur   Resp:  Breath sounds clear and equal bilaterally    Non-labored, no retractions or accessory muscle use    No coarseness    No wheezing   GI:  Abdomen is soft, no rigidity    No tenderness to palpation  MS:  Normal motor assessment of all extremities.    Good capillary refill noted.     Tenderness to palpation of the left hip.      Contusion to the right hand.    Skin:  Small lac as above.  Bruising to hand.  No rash or lesions noted.   Neuro:  Speech is normal and fluent. Dementia. No focal deficit.  Psych:  Awake. Alert.        Emergency Department Course     Imaging:  XR Wrist Right G/E 3 Views  No fractures identified. All views of the hand in slight radial deviation which is suboptimal for evaluating the navicular bone. No fractures are suggested but if there is suspicion for navicular fracture suggested dedicated navicular view. Degenerative changes at base of thumb, first CMC joint. Chondrocalcinosis involving the TFCC.  Reading per radiology    XR Pelvis w Hip Left 1 View  Comminuted intertrochanteric fracture of left hip. There is 90 degrees of varus angulation at the fracture site. The greater and lesser trochanters may be a separate fracture fragment.  Reading per radiology    Head CT w/o contrast  1.  No acute intracranial process.  Reading per radiology      Laboratory:  CBC: WBC 12.3 (H), HGB 10.6 (L),    BMP: Glucose 139 (H), GFR 41 (L) o/w WNL (Creatinine 1.14 (H))     Troponin: Pending  INR: 0.99    UA with microscopic:  pH 8.0 (H) o/w WNL     Asymptomatic COVID19 Virus PCR: Pending      Emergency Department Course:    Reviewed:  I reviewed nursing notes, vitals, past medical history and care everywhere    Assessments:  2337 I obtained history and examined the patient as noted above.     Consults:   2318 I consulted Dr. Blanc of the hospitalist service. They agree to accept care of the patient.    Interventions:   Td 0.5 mL IM    Disposition:  The patient was admitted to the hospital under the care of Dr. Blanc.       Impression & Plan   CMS Diagnoses: None  Medical Decision Making:    Rosie Guerrero is a 89 year old female who presents for evaluation following a fall. Workup for the etiology of her fall included CT head and lab tests, which were negative (troponin still pending).    Full examination revealed a wound to the left eyebrow which is tiny and does not require repair and bruising to the right wrist.  X-Ray imaging showed intertochanteric fracture of left hip.    In terms of head injury, the patient had a wound to her left eyebrow, so CT scan was undertaken and was negative..  The patient was admitted to the hospital for further evaluation and treatment.    Tetanus updated.    Covid-19  Rosie Guerrero was evaluated during a global COVID-19 pandemic, which necessitated consideration that the patient might be at risk for infection with the SARS-CoV-2 virus that causes COVID-19.   Applicable protocols for evaluation were followed during the patient's care.   COVID-19 was considered as part of the patient's evaluation. The plan for testing is:  a test was obtained during this visit.    Diagnosis:    ICD-10-CM    1. Closed fracture of left hip, initial encounter (H)  S72.002A UA with Microscopic         Scribe Disclosure:  I, Linn Cooper, am serving as a scribe at 11:42 PM on 6/3/2021 to document services personally performed by Nikia Alexis MD based on my observations and the provider's statements to me.         Nikia Alexis MD  06/04/21 0221

## 2021-06-04 NOTE — ED NOTES
Bed: ED05  Expected date: 6/3/21  Expected time: 11:05 PM  Means of arrival: Ambulance  Comments:  [address] 89F fall; hip injury

## 2021-06-04 NOTE — PHARMACY-ADMISSION MEDICATION HISTORY
Pharmacy Medication History  Admission medication history interview status for the 6/3/2021  admission is complete. See EPIC admission navigator for prior to admission medications       Medication history sources: MAR ([facility])    In the past week, patient estimated taking medication this percent of the time: greater than 90%    Medication reconciliation completed by provider prior to medication history? Yes    Time spent in this activity: 15 minutes    Prior to Admission medications    Medication Sig Last Dose Taking? Auth Provider   acetaminophen (TYLENOL) 325 MG tablet Take 650 mg by mouth every 6 hours as needed for mild pain  prn Yes Reported, Patient   aspirin 81 MG EC tablet Take 1 tablet (81 mg) by mouth daily 6/3/2021 at Unknown time Yes Drew Hall MD   calcium carbonate (OS-PATRICIA 500 MG [place]. [place]) 500 MG tablet Take 500 mg by mouth daily 6/3/2021 at Unknown time Yes Unknown, Entered By History   coenzyme Q-10 10 MG CAPS Take 10 mg by mouth daily  6/3/2021 at Unknown time Yes Drew Hall MD   magnesium oxide (MAG-OX) 400 (240 Mg) MG tablet Take 400 mg by mouth daily 6/3/2021 at Unknown time Yes Unknown, Entered By History   multivitamin w/minerals (THERA-VIT-M) tablet Take 1 tablet by mouth daily 6/3/2021 at Unknown time Yes Unknown, Entered By History   polyethylene glycol (MIRALAX) 17 g packet Take 17 g by mouth daily as needed for constipation prn Yes Drew Hall MD       The information provided in this note is only as accurate as the sources available at the time of update(s)

## 2021-06-04 NOTE — ED NOTES
Spoke to her poa/health proxy and advised her of the situation to this time. Would like to be called when scans back.

## 2021-06-04 NOTE — OP NOTE
[facility]  Operative Note  Cephalomedullary Nailing      oRsie Guerrero MRN# [id number]   YOB: 1928  Procedure Date:  6/4/2021  Age: 89 year old     PREOPERATIVE DIAGNOSIS:     1.  Displaced left osteoporotic pathologic interptrochanteric femur fracture.   2.  Osteoporosis.      POSTOPERATIVE DIAGNOSES:     1.  Displaced left osteoporotic pathologic interptrochanteric femur fracture.   2.  Osteoporosis.      PROCEDURE PERFORMED:  Left hip cephalomedullary nailing, femur fracture.      SURGEON:  Tesfaye Muñoz MD     ASSISTANT:  JAVIER Mcnulty who was critical for positioning patient, helping obtain reduction, retraction during exposure and implant placement, as well as closure.     ANESTHESIA:  General.      ESTIMATED BLOOD LOSS:  100 mL      IMPLANTS USED:  Synthes TFN advanced nail 10 mm x 170 mm x 125 degrees, cephalomedullary screw with distal interlock.      FINDINGS:  Rosie Guerrero is a 89 year old-year-old female who did do some ambulation prior to this with above-named injury.  A long discussion had regarding the nature of hip fractures, risks related to that and surgery discussed, included but not limited to bleeding, infection, damage to surrounding neurovascular structures, malunion, delayed union, nonunion, need for additional surgeries, blood clots that go to the heart, lung or brain, anesthetic complications or even death.  Discussed hip fracture mortality rates.  The patient and the family wished to proceed and consent signed.      DESCRIPTION OF PROCEDURE:  The patient identified in the preoperative holding area per hospital policy and the correct op site marked, to the OR table, Ancef given.  General anesthesia induced, placed onto the [place] fracture table legs positioned in a scissor position.  All bony prominences well padded.  Slight traction and internal rotation reduced the fracture.  Chlorhexidine pre-scrub, ChloraPrep, standard drape.  A timeout was performed.   All in the room agreed.         A small incision was made just proximal to greater trochanter after infiltrating 1% lidocaine with epinephrine and dissected down the greater trochanter, placed a guide pin confirming position AP and lateral views.  Opening reamer.  Placed the preoperatively templated 10 mm x 170 mm x 125 degree cephalomedullary nail.  Placed the guide wire into the center-center position in the femoral head and reamed, measured and placed the screw.  Checked AP, lateral and 30-degree arcs in between to ensure safe position, as well as appropriate reduction.  Locked the interference screw proximally.  Outrigger removed.  Distal interlock placed.  Final x-rays showed appropriate reduction, safe position of the implants. Thoroughly irrigated wounds, closed deep layers, 0 Vicryl, 2-0 Vicryl in subcutaneous, 4-0 Monocryl subcuticular.  Dermabond and sterile dressings applied.      POSTOPERATIVE PLAN:   1.  Weightbearing as tolerated with a walker x6 weeks.   2.  Deep venous thrombosis prophylaxis with aspirin enteric-coated 325 mg daily x6 weeks.   3.  Ancef x 24 hours  4.  Osteoporosis workup including calcium, vitamin D supplementation and followup with primary care doctor for additional treatment.   5.  Physical therapy.   6.  Pain control.  Minimize narcotics.      POSTOPERATIVE PLAN:  At two weeks, the dressing could be removed.  There are no sutures to be removed.  If all is going well, first followup could be 6 weeks with Dr. Mcdonald, [facility], with 2 views of the right femur, sooner if there is any difficulty.      [facility]  [facility]    Monday 1-5 PM  and Thursday 7:30-12:00 AM  [address] [place] [address]  [phone]    [phone]    Or    Tuesday 7:30-5 PM  [address]   [address]  [place], [place]     PHUC MCDONALD MD

## 2021-06-05 NOTE — PROGRESS NOTES
ORTHOPEDIC LOWER EXTREMITY PROGRESS NOTE    POD#1  Patient is a 89 year old female who underwent Procedure(s):  INTERNAL FIXATION, FRACTURE, TROCHANTERIC, HIP, USING PINS OR RODS on 6/3/2021 - 6/4/2021 on left. Pain is well controlled. Patient wakes briefly during exam, moans but does not respond to questions.    Vitals:  /57 (BP Location: Left arm)   Pulse 76   Temp 97.1  F (36.2  C) (Oral)   Resp 16   SpO2 98%       Drains: none    EXAM   The patient is sleeping.  Calves are soft and non-tender.   Sensation is intact.  Dorsiflexion and plantar flexion is intact.  Foot warm with nl cap refill.  The incision is covered.     Labs:   Recent Labs   Lab Test 06/05/21  0540 06/03/21  2331 04/13/21   HGB 8.2* 10.6* 12.1   INR  --  0.99  --        ASSESSMENT  S/p ORIF left hip   PLAN  1. DVT prophylaxis: aspirin  2. Weight Bearing: WBAT  3. Anticipated discharge date next few days pending progress with therapies .   4. Cont Pain Control tylenol    5. Follow vit D    Kjerstin Foss PA-C [facility]

## 2021-06-05 NOTE — PLAN OF CARE
Patient vital signs are at baseline: Yes  Patient able to ambulate as they were prior to admission or with assist devices provided by therapies during their stay:  No,  Reason:  Pt due to dangle  Patient MUST void prior to discharge:  Yes  Patient able to tolerate oral intake:  Yes  Pain has adequate pain control using Oral analgesics:  Yes    Pt alert to self only, lethargic arouses to voice. VSS on RA. CMS intact. Dressing to hip CDI. Due to dangle. Purewick in place bladder scanned for 289 at 0400. IV dilaudid given x1 for nonverbal indicators of pain. Discharge plans tbd.

## 2021-06-05 NOTE — PROGRESS NOTES
"   06/05/21 1300   Quick Adds   Type of Visit Initial PT Evaluation       Present no   Living Environment   People in home alone   Current Living Arrangements assisted living   Home Accessibility no concerns   Transportation Anticipated other (see comments)  (Pt not sure of how she will get back to assisted living)   Living Environment Comments Pt lives alone in an assisted living facility. Pt has no concerns regarding stairs. Pt unsure of how she will return to her assisted living upon discharge.   Self-Care   Usual Activity Tolerance fair   Current Activity Tolerance poor   Regular Exercise No   Equipment Currently Used at Home walker, rolling;raised toilet seat;shower chair;grab bar, toilet;grab bar, tub/shower   Activity/Exercise/Self-Care Comment Pt reports getting assistance w/ all ADLs including bathing and dressing. Pt reports using a FWW for all mobility. Pt states stairs are difficult for her to negotiate. Pt states she was able to ambulate ~30' prior to admittance w/ a FWW before needing a rest break.   Disability/Function   Hearing Difficulty or Deaf yes   Patient's preferred means of communication verbal   Describe hearing loss bilateral hearing loss   Wear Glasses or Blind yes   Vision Management glasses   Concentrating, Remembering or Making Decisions Difficulty yes   Walking or Climbing Stairs Difficulty yes   Walking or Climbing Stairs ambulation difficulty, requires equipment;stair climbing difficulty, assistance 1 person;stair climbing difficulty, dependent   Doing Errands Independently Difficulty (such as shopping) no   Fall history within last six months yes   Number of times patient has fallen within last six months 1   Change in Functional Status Since Onset of Current Illness/Injury yes   General Information   Onset of Illness/Injury or Date of Surgery 06/05/21   Referring Physician Robert Escoto PA-C   Patient/Family Therapy Goals Statement (PT) \"To go home\" "   Pertinent History of Current Problem (include personal factors and/or comorbidities that impact the POC) s/p ORIF for L hip fx POD#1   Existing Precautions/Restrictions fall   Weight-Bearing Status - LLE weight-bearing as tolerated   Weight-Bearing Status - RLE full weight-bearing   General Observations Pt is a poor historian and follow up will be needed to assess accuracy of responses   Cognition   Orientation Status (Cognition) disoriented to;place;situation   Pain Assessment   Patient Currently in Pain Yes, see Vital Sign flowsheet  (8/10)   Integumentary/Edema   Integumentary/Edema Comments Incision on L hip with dressing and bandage intact   Posture    Posture Forward head position;Protracted shoulders   Range of Motion (ROM)   ROM Quick Adds ROM deficits secondary to surgical procedure;ROM deficits secondary to pain;ROM deficits secondary to swelling;ROM deficits secondary to weakness   ROM Comment ROM limitation in BLEs due to weakness. Limitations in L LE due to weakness and surgical procedure.   Strength   Manual Muscle Testing Quick Adds Deficits observed during functional mobility   Strength Comments Unable to perform R SLR; Pt needing assist with all mobility   Bed Mobility   Comment (Bed Mobility) Attempted supine>sit w/ max A x 2, unable to sit fully upright due to excessive pain   Transfers   Transfer Safety Comments Unable to initiate   Gait/Stairs (Locomotion)   Comment (Gait/Stairs) Unable to initiate   Sensory Examination   Sensory Perception patient reports no sensory changes   Clinical Impression   Criteria for Skilled Therapeutic Intervention yes, treatment indicated   PT Diagnosis (PT) Impaired gait   Influenced by the following impairments Decreased activity tolerance; decreased balance; decreased strength; increased pain   Functional limitations due to impairments Impaired functional mobility   Clinical Presentation Stable/Uncomplicated   Clinical Presentation Rationale Clinical Judgement    Clinical Decision Making (Complexity) low complexity   Therapy Frequency (PT) 3x/week   Predicted Duration of Therapy Intervention (days/wks) 5 days   Planned Therapy Interventions (PT) balance training;bed mobility training;gait training;patient/family education;strengthening;transfer training   Risk & Benefits of therapy have been explained evaluation/treatment results reviewed;care plan/treatment goals reviewed;risks/benefits reviewed;current/potential barriers reviewed;participants voiced agreement with care plan;participants included;patient   PT Discharge Planning    PT Discharge Recommendation (DC Rec) Transitional Care Facility   PT Rationale for DC Rec Pt is well below baseline. Unable to complete functional transfers due to increased pain and weakness. Pt would benefit from continued PT services to improve activity tolerance, balance, and IND with safety and functional mobility.    PT Brief overview of current status  Attempted supine>sit w/ max A x 2 but unable to complete due increased pain   Total Evaluation Time   Total Evaluation Time (Minutes) 10

## 2021-06-05 NOTE — PROGRESS NOTES
MD Notification    Notified Person: MD    Notified Person Name: Eliceo--> Milind    Notification Date/Time: 6/5 @ 1800--> 1900    Notification Interaction: Page    Purpose of Notification: Pt coughing while eating. Perhaps speech consult.    Orders Received:    Comments:

## 2021-06-05 NOTE — PLAN OF CARE
OT: orders received and chart reviewed. Per PT, pt max A x 2 currently and will require TCU upon discharge. Therapy needs met by PT during hospital stay. Will defer OT to next level of care at TCU and complete OT order.

## 2021-06-05 NOTE — PLAN OF CARE
POD#0 of L hip. Pt alert to self, confused. VSS on RA- capno removed (Pulling off constantly. O2 is 96-98%). R PIV infusing LR at 75/hr. L PIV SL. Pain controlled w/ dilaudid x1, tylenol x1 and ice. L hip dressing CDI. Pt can move all extremities- not following commands. Tolerating regular diet- poor appetite. Incont upon arrival to unit- purwick in place. Bladder scanned: 196. Plan pending pt progress.

## 2021-06-05 NOTE — PLAN OF CARE
L hip POD 1. Alert to self only. CMS intact, dressing CDI. T/r, not oob, PT this afternoon. Tylenol and dilaudid given x1. Increased pain with movement. Regular diet, poor appetite, needs help feeding. Purewick in place. Bladder scan 329 @ 0900, voiding small amounts. Poor oral intake. PIV infusing. Hbg 8.2. From memory care. Discharge pending.

## 2021-06-05 NOTE — PROGRESS NOTES
[facility]  Hospitalist Progress Note  Kenan Fenton MD  06/05/2021    Assessment & Plan      Rosie Guerrero is a 89 year old female who presents with a fall           R intertrochanteric fracture of L hip POD # 1  With fall at facility evening of presentation. No reported head trauma or LOC, unclear if witnessed.  CT head negative. XR pelvis with comminuted intertrochanteric fracture of the L hip. R wrist imaging without fracture.  - orthopedic surgery following  - WBAT x 6 weeks with walker, asa 325 mg x 6 week for DVT prophylaxis  - follow up in 2 weeks for dressing removal and with orthopedic surgeon in 6 weeks.  - to TCU on 6/7     Leukocytosis  WBC 12.3. UA bland. Afebrile. Likely reactive.  - repeat labs in am shows wbc 12.3 >> 11.6     Blood loss anemia  -- hgb 12.1 (April 21) >> 10.6 >> 8.2  -- will check b12/folate/Fe  -- likely order Fe on 6/6    Dementia  Lives at [facility].   - Re-orient as needed  - Maintain normal day/night, sleep wake cycles  - Minimize sedating/altering medications as able     HTN  HLD  /82 on presentation   - prn hydralazine, labetalol available     H/o CVA  [reportedly aspirin]  - resume full dose asa 325 mg for DVT prophylaxis     CKD  4/2021 creatinine 1.28. on presentation at 1.24  - avoid nephrotoxins  - monitor creatinine     COVID-19 negative on admission     DVT Prophylaxis: Pneumatic Compression Devices  Code Status: DNR     Disposition:     Interval History   -- chart reviewed  -- no acute overnight events  -- mostly lethargic most of the day but working with PT    -Data reviewed today: I reviewed all new labs and imaging over the last 24 hours. I personally reviewed no images or EKG's today.    Physical Exam    , Blood pressure 105/49, pulse 73, temperature 98.2  F (36.8  C), temperature source Oral, resp. rate 16, SpO2 98 %, not currently breastfeeding.  There were no vitals filed for this visit.  Vital Signs with Ranges  Temp:   [97.1  F (36.2  C)-99.6  F (37.6  C)] 98.2  F (36.8  C)  Pulse:  [53-78] 73  Resp:  [8-22] 16  BP: (105-153)/() 105/49  SpO2:  [96 %-100 %] 98 %  I/O's Last 24 hours  I/O last 3 completed shifts:  In: 1380 [P.O.:180; I.V.:1200]  Out: 500 [Urine:400; Blood:100]    Constitutional: Awake, alert, cooperative, no apparent distress  Respiratory: Clear to auscultation bilaterally, no crackles or wheezing  Cardiovascular: Regular rate and rhythm, normal S1 and S2, and no murmur noted  GI: Normal bowel sounds, soft, non-distended, non-tender  Skin/Integumen: No rashes, no cyanosis, no edema  Other:      Medications   All medications were reviewed.    lactated ringers 75 mL/hr at 06/04/21 1746       aspirin  325 mg Oral Daily     ceFAZolin  1 g Intravenous Q12H     docusate sodium  100 mg Oral BID     polyethylene glycol  17 g Oral Daily     senna-docusate  1 tablet Oral BID     sodium chloride (PF)  3 mL Intracatheter Q8H     sodium chloride (PF)  3 mL Intracatheter Q8H        Data   Recent Labs   Lab 06/05/21  0540 06/04/21  0417 06/03/21  2331   WBC 11.6*  --  12.3*   HGB 8.2*  --  10.6*   MCV 96  --  95     --  236   INR  --   --  0.99     --  139   POTASSIUM 4.7 4.7 4.6   CHLORIDE 108  --  108   CO2 25  --  26   BUN 28  --  25   CR 1.24*  --  1.14*   ANIONGAP 4  --  5   PATRICIA 9.1  --  9.5   *  --  139*   TROPI  --   --  <0.015       Recent Results (from the past 24 hour(s))   XR Surgery JIL Fluoro L/T 5 Min w Stills    Narrative    XR SURGERY JIL FLUORO LESS THAN 5 MIN W STILLS 6/4/2021 2:30 PM     COMPARISON: None    HISTORY: left hip pinning    NUMBER OF IMAGES ACQUIRED: 8    VIEWS: AP and oblique views of the left proximal left femur    FLUOROSCOPY TIME: .6 minute(s)      Impression    IMPRESSION: 8 spot intraoperative fluoroscopic views demonstrate  placement of a dynamic screw and intramedullary gumaro spanning the  proximal left femur. Alignment appears near-anatomic. No evidence  of  periprostatic fracture. For full details, please refer to the  surgeon's operative report.    MD Kenan GORDILLO MD  Text Page  (7am to 6pm)

## 2021-06-06 NOTE — PROGRESS NOTES
hospitalist cross-cover: paged by RN regarding concern of patient coughing when eating, difficult to ascertain lung sounds due to underlying dementia and minimal cooperation with exam. Note plan to discharge to TCU following hip fracture. Will defer need of speech evaluation to tsering CALZADA.    Wade Adams PA-C  6/5/2021, 7:16 PM  Pager: [phone]

## 2021-06-06 NOTE — PROGRESS NOTES
[facility]  Hospitalist Progress Note  Kenan Fenton MD  06/06/2021    Assessment & Plan      Rosie Guerrero is a 89 year old female who presents with a fall           R intertrochanteric fracture of L hip POD # 2  With fall at facility evening of presentation. No reported head trauma or LOC, unclear if witnessed.  CT head negative. XR pelvis with comminuted intertrochanteric fracture of the L hip. R wrist imaging without fracture.  - orthopedic surgery following  - WBAT x 6 weeks with walker, asa 325 mg x 6 week for DVT prophylaxis  - follow up in 2 weeks for dressing removal and with orthopedic surgeon in 6 weeks.  - to TCU on 6/7     Leukocytosis  WBC 12.3. UA bland. Afebrile. Likely reactive.  - repeat labs in am shows wbc 12.3 >> 11.6     Blood loss anemia  -- hgb 12.1 (April 21) >> 10.6 >> 8.2 > 7.1  -- will check b12/folate levels ok  -- notes severly low Fe of 6  -- will give pRBC today and venofer tomorrow    Dementia  Rule out dysphagia  Lives at [facility] homes.   - speech consult appreciated and she is on dysphagia diet  - Maintain normal day/night, sleep wake cycles  - Minimize sedating/altering medications as able     HTN  HLD  /82 on presentation   - prn hydralazine, labetalol available     H/o CVA  [reportedly aspirin]  - resume full dose asa 325 mg for DVT prophylaxis     CKD  4/2021 creatinine 1.28. on presentation at 1.24  - avoid nephrotoxins  - monitor creatinine     COVID-19 negative on admission     DVT Prophylaxis: Pneumatic Compression Devices  Code Status: DNR     Disposition:     Interval History   -- noted concerns for aspiration  -- no acute overnight events    -Data reviewed today: I reviewed all new labs and imaging over the last 24 hours. I personally reviewed no images or EKG's today.    Physical Exam    , Blood pressure 112/51, pulse 62, temperature 98  F (36.7  C), temperature source Oral, resp. rate 12, weight 51.4 kg (113 lb 5 oz), SpO2 96 %, not  currently breastfeeding.  Vitals:    06/06/21 0420   Weight: 51.4 kg (113 lb 5 oz)     Vital Signs with Ranges  Temp:  [98  F (36.7  C)-98.5  F (36.9  C)] 98  F (36.7  C)  Pulse:  [62-86] 62  Resp:  [12-20] 12  BP: (112-135)/(51-57) 112/51  SpO2:  [91 %-96 %] 96 %  I/O's Last 24 hours  I/O last 3 completed shifts:  In: 60 [P.O.:60]  Out: 1300 [Urine:1300]    Constitutional: Awake, alert, cooperative, no apparent distress  Respiratory: Clear to auscultation bilaterally, no crackles or wheezing  Cardiovascular: Regular rate and rhythm, normal S1 and S2, and no murmur noted  GI: Normal bowel sounds, soft, non-distended, non-tender  Skin/Integumen: No rashes, no cyanosis, no edema  Other:      Medications   All medications were reviewed.    lactated ringers 75 mL/hr at 06/04/21 1746       aspirin  325 mg Oral Daily     docusate sodium  100 mg Oral BID     polyethylene glycol  17 g Oral Daily     senna-docusate  1 tablet Oral BID     sodium chloride (PF)  3 mL Intracatheter Q8H     sodium chloride (PF)  3 mL Intracatheter Q8H        Data   Recent Labs   Lab 06/06/21  0542 06/05/21  0540 06/04/21  0417 06/03/21  2331   WBC 9.0 11.6*  --  12.3*   HGB 7.1* 8.2*  --  10.6*   MCV 95 96  --  95    200  --  236   INR  --   --   --  0.99    137  --  139   POTASSIUM 4.3 4.7 4.7 4.6   CHLORIDE 109 108  --  108   CO2 27 25  --  26   BUN 31* 28  --  25   CR 1.17* 1.24*  --  1.14*   ANIONGAP 4 4  --  5   PATRICIA 8.6 9.1  --  9.5   * 124*  --  139*   TROPI  --   --   --  <0.015       No results found for this or any previous visit (from the past 24 hour(s)).    Kenan Fenton MD  Text Page  (7am to 6pm)

## 2021-06-06 NOTE — PLAN OF CARE
Patient vital signs are at baseline: Yes  Patient able to ambulate as they were prior to admission or with assist devices provided by therapies during their stay:  No,  Reason:  Unable to ambulate, bed bound d/t pain.  Patient MUST void prior to discharge:  Yes  Patient able to tolerate oral intake:  Yes  Pain has adequate pain control using Oral analgesics:  No,  Reason:  Pain with all movement. Generalized weakness.     Orientation: Alert to self.  VS/ O2/ IV: VSS on RA. JOHNIE LR @ 75  Mobility: Not oob on shift. Q2H repo.  Pain Management: Tylenol scheduled/ Ice pack. Shoulder, back, and stomach pain.  Diet: Regular, coughs while swallowing. Soft foods ordered. Adequate intake.  Bowel/ Bladder: Incontinent. No BM-Milk of Mag x 1. Purewick w/ adequate output  Labs/ BG: HGB 8.6  Skin: Blanchable redness and  rash BLLE. Dressing L. Hip C/D/I.  CMS: [place]. +1 pulses.  Discharge/ Plan: TCU pending placement. From [facility]

## 2021-06-06 NOTE — CONSULTS
Care Management Initial Consult    General Information  Assessment completed with: VM-chart review,    Type of CM/SW Visit: Initial Assessment    Primary Care Provider verified and updated as needed:     Readmission within the last 30 days: no previous admission in last 30 days      Reason for Consult: discharge planning  Advance Care Planning:            Communication Assessment  Patient's communication style: spoken language (English or Bilingual)    Hearing Difficulty or Deaf: yes   Wear Glasses or Blind: yes    Cognitive  Cognitive/Neuro/Behavioral: .WDL except  Level of Consciousness: confused  Arousal Level: opens eyes spontaneously  Orientation: disoriented x 4  Mood/Behavior: restless  Best Language: 0 - No aphasia  Speech: slow    Living Environment:   People in home: alone, facility resident     Current living Arrangements: assisted living, extended care facility  Name of Facility: [facility]    Able to return to prior arrangements:         Family/Social Support:  Care provided by: homecare agency  Provides care for: (professional Health Care Agent)  Marital Status:              Description of Support System: Other (see comments)         Current Resources:   Patient receiving home care services:       Community Resources:    Equipment currently used at home: walker, rolling, raised toilet seat, shower chair, grab bar, toilet, grab bar, tub/shower  Supplies currently used at home:      Employment/Financial:  Employment Status: retired        Financial Concerns: No concerns identified           Lifestyle & Psychosocial Needs:        Socioeconomic History     Marital status:      Spouse name: Not on file     Number of children: 0     Years of education: Not on file     Highest education level: Not on file   Occupational History     Employer: RETIRED     Tobacco Use     Smoking status: Former Smoker     Quit date: 1974     Years since quittin.4     Smokeless tobacco: Never Used    Substance and Sexual Activity     Alcohol use: Yes     Alcohol/week: 0.0 standard drinks     Comment: occassionally     Drug use: No     Sexual activity: Not Currently       Functional Status:  Prior to admission patient needed assistance:              Mental Health Status:          Chemical Dependency Status:                Values/Beliefs:  Spiritual, Cultural Beliefs, Spiritism Practices, Values that affect care:                 Additional Information:  Consult for discharge planning. Patient lives at [facility]. Patient has a professional health care agent. Call placed to [facility] and message left. Referral sent to [facility] via [facility]. Beside nurse updated.     Update: call returned by patient's [facility] Ash. Writer updated on status and confirmed placement would preferably be back at her [facility]. Writer also discussed that discharge will not be today and offered to have her nurse return a call. Ash will reach out to unit at a later victor m.    Patient is vaccinated for COVID.     EMMA Kowalski

## 2021-06-06 NOTE — ANESTHESIA PROCEDURE NOTES
Fascia iliaca (targeting saphenous nerve) Procedure Note  Pre-Procedure   Staff -        Anesthesiologist:  La Nena Salmeron MD       Performed By: anesthesiologist       Location: pre-op       Pre-Anesthestic Checklist: patient identified, IV checked, site marked, risks and benefits discussed, informed consent, monitors and equipment checked, pre-op evaluation, at physician/surgeon's request and post-op pain management  Timeout:       Correct Patient: Yes        Correct Procedure: Yes        Correct Site: Yes        Correct Position: Yes        Correct Laterality: Yes        Site Marked: Yes  Procedure Documentation  Procedure: Fascia iliaca (targeting saphenous nerve)       Laterality: left       Patient Position: supine       Skin prep: Chloraprep       Local skin infiltrated with 1 mL of 1% lidocaine.        Needle Type: insulated       Needle Gauge: 21.        Needle Length (millimeters): 100        Ultrasound guided       1. Ultrasound was used to identify targeted nerve, plexus, vascular marker, or fascial plane and place a needle adjacent to it in real-time.       2. Ultrasound was used to visualize the spread of anesthetic in close proximity to the above referenced structure.       3. A permanent image is entered into the patient's record.    Assessment/Narrative         The placement was negative for: blood aspirated, painful injection and site bleeding       Paresthesias: No.     Bolus given via needle..        Secured via.        Insertion/Infusion Method: Single Shot       Complications: none    Comments:  Bolus via needle, 30 ml of 0.25% Bupivacaine with 1:400,000 epinephrine.  Patient tolerated well, was mildly sedated but communicative throughout the procedure.   The surgeon has given a verbal order transferring care of this patient to me for the performance of regional analgesia block for post op pain control. It is requested of me because I am uniquely trained and qualified to perform this block  and the surgeon is neither trained nor qualified to perform this procedure.

## 2021-06-06 NOTE — PLAN OF CARE
Patient vital signs are at baseline: Yes  Patient able to ambulate as they were prior to admission or with assist devices provided by therapies during their stay:  No,  Reason:  attempted to sit on the edge of the bed, pt refused, agitated and combative  Patient MUST void prior to discharge:  Yes, incontinent, purewick in place.   Patient able to tolerate oral intake:  No,  Reason: tolerate nectar thick liquid.  Pain has adequate pain control using Oral analgesics:  No,  Reason:  administer IV dilaudid.      Patient is Alert to self,confused, agitated and combative with movement/ cares,  turn and repo, complains of pain with reposition. Dressing CDI. A unit of  blood is transfusing. On pureed diet with nectar thick liquid. Speech will follow tomorrow. Will continue to monitor.

## 2021-06-06 NOTE — PROGRESS NOTES
Clinical Swallow Evaluation:     Pt presents with moderate oral and suspected pharyngeal dysphagia on clinical swallow evaluation, though limited evaluation 2/2 reduced participation 2/2 significant pain (even post pain medications provided by RN).     Recommendations: Dysphagia diet level 1, nectar thick liquids when as upright as possible given significant pain, 1:1 assist, slow pacing, small bites/sips; crush meds with puree as able.     06/06/21 9485   General Information   Referring Physician Chelsea Luna PA-C   Patient/Family Therapy Goal Statement (SLP) did not state   Pertinent History of Current Problem Pt admitted from [facility] s/p fall, found to have a hip fracture, s/p hip surgery on 6/4. Noted difficulty swallowing with pills, solids and liquids and SLP consulted. Pt on regular/thin diet per care facility paperwork.   General Observations Significant pain (RN provided medications p/t assessment) inhibiting participation and ability to sit fully upright.    Pain Assessment   Patient Currently in Pain   (Yes)   Type of Evaluation   Type of Evaluation Swallow Evaluation   Oral Motor   Oral Musculature unable to assess due to poor participation/comprehension   General Swallowing Observations   Current Diet/Method of Nutritional Intake (General Swallowing Observations, NIS) regular diet   Respiratory Support (General Swallowing Observations) none   Swallowing Evaluation Clinical swallow evaluation   Clinical Swallow Evaluation   Feeding Assistance dependent   Clinical Swallow Evaluation Textures Trialed Thin Liquids;Nectar-Thick Liquids;Puree Textures;Solid Foods   Clinical Swallow Eval: Thin Liquid Texture Trial   Mode of Presentation, Thin Liquids spoon;straw;fed by clinician   Volume of Liquid or Food Presented x3 sips   Oral Phase of Swallow Premature pharyngeal entry   Pharyngeal Phase of Swallow impaired;reduction in laryngeal movement;coughing/choking   Diagnostic Statement overt  coughing on 3/3 trials   Clinical Swallow Evaluation: Nectar-Thick Liquid Texture Trial   Mode of Presentation, Nectar straw;fed by clinician   Volume of Nectar Presented x3 sips   Oral Phase, Nectar WFL   Pharyngeal Phase, Nectar reduction in laryngeal movement   Diagnostic Statement no overt signs/sx aspiration with limited trials   Clinical Swallow Evaluation: Puree Solid Texture Trial   Mode of Presentation, Puree spoon;fed by clinician   Volume of Puree Presented x1 bite   Oral Phase, Puree Poor AP movement   Pharyngeal Phase, Puree reduction in laryngeal movement   Diagnostic Statement slow oral movement, no overt signs/sx aspiration noted   Clinical Swallow Evaluation: Solid Food Texture Trial   Mode of Presentation, Solid fed by clinician   Volume of Solid Food Presented x1 bite   Diagnostic Statement Pt spitting bolus out at writer, refused chewable solids.   Swallowing Recommendations   Diet Consistency Recommendations dysphagia level 1 (pureed);nectar-thick liquids   Supervision Level for Intake 1:1 supervision needed   Mode of Delivery Recommendations bolus size, small;slow rate of intake   Monitoring/Assistance Required (Eating/Swallowing) stop eating activities when fatigue is present;monitor for cough or change in vocal quality with intake   Recommended Feeding/Eating Techniques (Swallow Eval) maintain upright sitting position for eating;maintain upright posture during/after eating for 30 minutes;provide assist with feeding   Medication Administration Recommendations, Swallowing (SLP) crush with cornel, as able   General Therapy Interventions   Planned Therapy Interventions Dysphagia Treatment   SLP Therapy Assessment/Plan   Criteria for Skilled Therapeutic Interventions Met (SLP Eval) yes;treatment indicated   SLP Diagnosis moderate oral and suspected pharyngeal dysphagia   Rehab Potential (SLP Eval) fair, will monitor progress closely   Therapy Frequency (SLP Eval) 5 times/wk   Predicted Duration of  Therapy Intervention (SLP Eval) 1 week   Comment, Therapy Assessment/Plan (SLP) Pt presents with moderate oral and suspected pharyngeal dysphagia on clinical swallow evaluation, though limited evaluation 2/2 reduced participation 2/2 significant pain (even post pain medications provided by RN). RN/chart indicate overt coughing/difficulty with medications, solid food and liquids. Pt refused/called out in pain with raising head up, able to achieve 50 degrees before pt refusing further - positioning likely exacerbating swallow deficits. Reduced oral control with thin liquids resulting in suspect premature bolus spillage, overt coughing on 3/3 trials - improved control with nectar thick liquids and puree where no overt clinical signs/sx aspiration noted. Pt spit out chewable solid bolus and therefore unable to comment on same, however given reported difficulty with chewable solids, inability to sit upright given pain and fatigue safest appears to be dysphagia diet level 1.    Therapy Plan Review/Discharge Plan (SLP)   Therapy Plan Review (SLP) evaluation/treatment results reviewed;care plan/treatment goals reviewed;participants included;patient   SLP Discharge Planning    SLP Discharge Recommendation (DC Rec) Transitional Care Facility   SLP Rationale for DC Rec Pt well below baseline of regular/thin diet   SLP Brief overview of current status  Dysphagia diet level 1, nectar thick liquids when as upright as possible given significant pain, 1:1 assist, slow pacing, small bites/sips; crush meds with puree as able.    Total Evaluation Time   Total Evaluation Time (Minutes) 9

## 2021-06-06 NOTE — ANESTHESIA POSTPROCEDURE EVALUATION
Patient: Rosie Guerrero    Procedure(s):  INTERNAL FIXATION, FRACTURE, TROCHANTERIC, HIP, USING PINS OR RODS    Diagnosis:Hip fracture (H) [S72.009A]  Diagnosis Additional Information: No value filed.    Anesthesia Type:  General    Note:  Disposition: Admission   Postop Pain Control: Uneventful            Sign Out: Well controlled pain   PONV: No   Neuro/Psych: Uneventful            Sign Out: Acceptable/Baseline neuro status   Airway/Respiratory: Uneventful            Sign Out: Acceptable/Baseline resp. status   CV/Hemodynamics: Uneventful            Sign Out: Acceptable CV status   Other NRE: NONE   DID A NON-ROUTINE EVENT OCCUR? No           Last vitals:  Vitals:    06/05/21 1205 06/05/21 1548 06/05/21 1756   BP: 105/49 124/51    Pulse: 73 86    Resp: 16 16    Temp: 36.8  C (98.2  F)  36.9  C (98.5  F)   SpO2: 98% 92%        Last vitals prior to Anesthesia Care Transfer:  CRNA VITALS  6/4/2021 1440 - 6/4/2021 1540      6/4/2021             Pulse:  70    SpO2:  100 %    Resp Rate (set):  10          Electronically Signed By: La Nena Salmeron MD  June 5, 2021  7:24 PM

## 2021-06-06 NOTE — PROGRESS NOTES
Orthopedic Surgery  Rosie Guerrero  2021  Admit Date:  6/3/2021  POD 2 Days Post-Op  S/P ORIF L IT fx     Patient is resting comfortably.  Pain appears controlled.   Nursing reports difficulty swallowing pills.       Sleepy, opens eyes but does not respond to commands. Limited exam.   Vital Sign Ranges  Temperature Temp  Av.3  F (36.8  C)  Min: 98  F (36.7  C)  Max: 98.5  F (36.9  C)   Blood pressure Systolic (24hrs), Av , Min:105 , Max:135        Diastolic (24hrs), Av, Min:49, Max:57      Pulse Pulse  Av.3  Min: 73  Max: 86   Respirations Resp  Av.1  Min: 16  Max: 20   Pulse oximetry SpO2  Av.3 %  Min: 91 %  Max: 98 %       Dressing is clean, dry, and intact. Minimal erythema of the surrounding skin.  Bilateral calves are soft.  Foot warm with nl cap refill.     Labs:  Recent Labs   Lab Test 21  0542 21  0540 21  0417   POTASSIUM 4.3 4.7 4.7     Recent Labs   Lab Test 21  0542 21  0540 21  2331   HGB 7.1* 8.2* 10.6*     Recent Labs   Lab Test 21  2331   INR 0.99     Recent Labs   Lab Test 21  0542 21  0540 21  2331    200 236       A/P  1. S/p ORIF left IT fx   Continue ASA for DVT prophylaxis.     Mobilize with PT/OT WBAT.     Continue current pain regiment.   Speech eval for swallowing eval   Hgb 7.1, recheck Hgb in AM.     2. Disposition   Anticipate d/c to TCU 1-2 days pending therapy and medical clearance.    Chelsea HERRERA  [facility]  [phone]

## 2021-06-07 NOTE — PROGRESS NOTES
"Orthopedic Surgery  Rosie Guerrero  2021  Admit Date:  6/3/2021  POD: 3 Days Post-Op   Procedure(s):  INTERNAL FIXATION, FRACTURE, TROCHANTERIC, HIP, USING PINS OR RODS    Patient resting comfortably in bed.    Pain controlled.  Tolerating oral intake.      Vital Sign Ranges  Temperature Temp  Av.1  F (36.7  C)  Min: 97.7  F (36.5  C)  Max: 98.5  F (36.9  C)   Blood pressure Systolic (24hrs), Av , Min:140 , Max:163        Diastolic (24hrs), Av, Min:57, Max:74      Pulse Pulse  Av.1  Min: 62  Max: 87   Respirations Resp  Av  Min: 12  Max: 20   Pulse oximetry SpO2  Av.4 %  Min: 93 %  Max: 97 %       Dressing is clean, dry, and intact.   Minimal erythema of the surrounding skin.   Bilateral calves are soft, non-tender.  Left lower extremity is NVI.  Sensation intact bilateral lower extremities  Wiggling toes. Refuses to move ankle upon request.  States \"I don't want to\"  +Dp pulse    Labs:  Recent Labs   Lab Test 21  0626 21  0542 21  0540   POTASSIUM 4.1 4.3 4.7     Recent Labs   Lab Test 21  0626 21  0542 21  0540   HGB 8.4* 7.1* 8.2*     Recent Labs   Lab Test 21  2331   INR 0.99     Recent Labs   Lab Test 21  0626 21  0542 21  0540    166 200        A/P  1. S/p ORIF left IT fx              Continue ASA for DVT prophylaxis.                Mobilize with PT/OT WBAT.                Continue current pain regiment.              Speech eval for swallowing eval              Hgb improved today.      2. Disposition              Anticipate d/c to TCU pending therapy and medical clearance.  Ok from ortho standpoint         Kinza Chung PA-C    "

## 2021-06-07 NOTE — PROGRESS NOTES
[facility]  Hospitalist Progress Note  Kenan Fenton MD  06/07/2021    Assessment & Plan      Rosie Guerrero is a 89 year old female who presents with a fall           R intertrochanteric fracture of L hip POD # 3  With fall at facility evening of presentation. No reported head trauma or LOC, unclear if witnessed.  CT head negative. XR pelvis with comminuted intertrochanteric fracture of the L hip. R wrist imaging without fracture.  - orthopedic surgery following  - WBAT x 6 weeks with walker, asa 325 mg x 6 week for DVT prophylaxis  - follow up in 2 weeks for dressing removal and with orthopedic surgeon in 6 weeks.  - to TCU on 6/8     Leukocytosis  WBC 12.3 but decreased, UA bland. Afebrile. Likely reactive.  - repeat labs in am shows wbc 12.3 >> 11.6 >> 9 >> 8.7     Blood loss anemia  -- hgb 12.1 (April 21) >> 10.6 >> 8.2 > 7.1  -- will check b12/folate levels ok  -- notes severly low Fe of 6  -- given pRBC yesterday and venofer tomorrow    Dementia  Rule out dysphagia  Lives at [facility] homes.   - speech consult appreciated and she is on dysphagia diet  - Maintain normal day/night, sleep wake cycles  - Minimize sedating/altering medications as able     HTN  HLD  /82 on presentation   - prn hydralazine, labetalol available     H/o CVA  [reportedly aspirin]  - resume full dose asa 325 mg for DVT prophylaxis     CKD  4/2021 creatinine 1.28. on presentation at 1.24  - avoid nephrotoxins  - monitor creatinine     COVID-19 negative on admission     DVT Prophylaxis: Pneumatic Compression Devices    Code Status: DNR     Disposition: back to [facility] tomorrow afternoon    Interval History   -- more interactive today  -- no acute overnight events    -Data reviewed today: I reviewed all new labs and imaging over the last 24 hours. I personally reviewed no images or EKG's today.    Physical Exam    , Blood pressure (!) 150/62, pulse 74, temperature 98.6  F (37  C), temperature source Axillary,  resp. rate 24, weight 51.4 kg (113 lb 5 oz), SpO2 96 %, not currently breastfeeding.  Vitals:    06/06/21 0420   Weight: 51.4 kg (113 lb 5 oz)     Vital Signs with Ranges  Temp:  [97.8  F (36.6  C)-98.6  F (37  C)] 98.6  F (37  C)  Pulse:  [63-87] 74  Resp:  [16-24] 24  BP: (145-163)/(57-74) 150/62  SpO2:  [93 %-96 %] 96 %  I/O's Last 24 hours  I/O last 3 completed shifts:  In: 579.58 [P.O.:240]  Out: 1150 [Urine:1150]    Constitutional: Awake, alert, cooperative, no apparent distress  Respiratory: Clear to auscultation bilaterally, no crackles or wheezing  Cardiovascular: Regular rate and rhythm, normal S1 and S2, and no murmur noted  GI: Normal bowel sounds, soft, non-distended, non-tender  Skin/Integumen: No rashes, no cyanosis, no edema  Other:      Medications   All medications were reviewed.      aspirin  325 mg Oral Daily     docusate sodium  100 mg Oral BID     polyethylene glycol  17 g Oral Daily     senna-docusate  1 tablet Oral BID     sodium chloride (PF)  3 mL Intracatheter Q8H     sodium chloride (PF)  3 mL Intracatheter Q8H        Data   Recent Labs   Lab 06/07/21  0626 06/06/21  0542 06/05/21  0540 06/03/21  2331 06/03/21  2331   WBC 8.7 9.0 11.6*  --  12.3*   HGB 8.4* 7.1* 8.2*  --  10.6*   MCV 95 95 96  --  95    166 200  --  236   INR  --   --   --   --  0.99    140 137  --  139   POTASSIUM 4.1 4.3 4.7   < > 4.6   CHLORIDE 110* 109 108  --  108   CO2 27 27 25  --  26   BUN 31* 31* 28  --  25   CR 1.04 1.17* 1.24*  --  1.14*   ANIONGAP 3 4 4  --  5   PATRICIA 8.9 8.6 9.1  --  9.5   * 114* 124*  --  139*   TROPI  --   --   --   --  <0.015    < > = values in this interval not displayed.       No results found for this or any previous visit (from the past 24 hour(s)).    Kenan Fenton MD  Text Page  (7am to 6pm)

## 2021-06-07 NOTE — PLAN OF CARE
Pt is alert to self, confused, agitated and has been combative when repositioning. CMS intact. Dressing on L hip CDI. Turn/repo q 2 hr. VSS, Ax2 with lift, incontinent w/ purwick in place. On pureed diet with nectar thickened liquid. Complains of pain when turning and repositioning, given tylenol prn w/ applesauce. Plan to discharge to TCU and follow up with speech.

## 2021-06-07 NOTE — PLAN OF CARE
POD 3. Alert to self, forgetful and agitated with turns. PAINAD scale used and administering PRN tylenol. VSS on RA. LS with fine crackles with infrequent loose cough. Ax2 with lift. WBAT on LLE. Turn and repo. Tolerating DD1 pureed diet with nectar thickened liquids, but has poor appetite. Takes meds crushed with applesauce. BS active, but no BM yet. Purewick in place with adequate OP. L hip dressing CDI with ice applied PRN. Discharge pending TCU placement, likely discharge tomorrow.

## 2021-06-08 NOTE — PROGRESS NOTES
Care Management Discharge Note    Discharge Date: 06/08/21(TCU)       Discharge Disposition: Transitional Care, Long Term Care    Discharge Services: None    Discharge DME: None     Discharge Transportation: health plan transportation through E via stretcher at 15:00 today    Private pay costs discussed: Not applicable    PAS Confirmation Code: n/a   Patient/family educated on Medicare website which has current facility and service quality ratings: yes. Education Provided on the Discharge Plan:  yes  Persons Notified of Discharge Plans: yes, MACK Singh updated and in agreement with plan.  Patient/Family in Agreement with the Plan: yes    Handoff Referral Completed: No    Additional Information:  PCS form completed, faxed and placed on pt chart for time of discharge. Pt has dementia diagnosis and requires supervision for safety.  CTRN,RN,HUC,BB updated.  Discharge orders sent via [facility] to [phone] per request.[    UMER CaraballoBert[facility]  [phone]

## 2021-06-08 NOTE — PLAN OF CARE
Pt turned every 2 hours. Alert to self only. Started on norvasc for high BP. Discharged back to [facility] at 1500 via [facility] transport

## 2021-06-08 NOTE — PROGRESS NOTES
Care Management Discharge Note    Discharge Date: 06/08/21(TCU)       Discharge Disposition: Transitional Care, Long Term Care    Discharge Services: None    Discharge DME: None    Discharge Transportation: health plan transportation    Private pay costs discussed: Not applicable Pt is discharging by stretcher due to medical need for supervision. With history of dementia pt requires supervision for safety.  NILE completed PCS, faxed and placed on pt chart for time of discharge    PAS Confirmation Code:  n/a  Patient/family educated on Medicare website which has current facility and service quality ratings: yes    Education Provided on the Discharge Plan:  Yes  Persons Notified of Discharge Plans: Patient/Family in Agreement with the Plan: yes    Handoff Referral Completed: No    Additional Information: NILE spoke with Ash GIRON regarding pt's return to [facility] today at 15:00 pending stable B/P  She is in agreement wit discharge plan.  Update faxed to C NILE Perkins at [phone]. Awaiting discharge orders when ready. Scripts on chart faxed also.  No PAS required.    EMMA Caraballo  [facility]  [phone]

## 2021-06-08 NOTE — PROVIDER NOTIFICATION
0900-text paged Dr. Greene regarding pt BP. MD called and said to give the 5mg norvasc and pain medication and reevaluate in 1 hour. Pt rounding and informed of increase in BP, MD stated she will adjust the medication for discharge and is comfortable with pt discharging on pain plan and BP medication

## 2021-06-08 NOTE — PLAN OF CARE
Pt alert to self. Hypertensive; PRN Labetalol given with some  Improvement. Pain managed with PRN dilaudid relief. Other VSS on RA, turn and repo Q2hrs. No BM this shift, dressing CDI, ice applied as needed.  Purewick in place, possible discharge today. Continue to monitor.

## 2021-06-08 NOTE — PLAN OF CARE
Speech Language Therapy Discharge Summary    Reason for therapy discharge:    Discharged to transitional care facility.  Patient set to discharge this afternoon at 1500    Progress towards therapy goal(s). See goals on Care Plan in [facility] electronic health record for goal details.  Goals not met.  Barriers to achieving goals:   discharge from facility.    Therapy recommendation(s):    Continued therapy is recommended.  Rationale/Recommendations:  Pt well below baseline regular diet with thin liquids. Pt tolerating dysphagia diet level 1 with nectar thick liquids. Intermittent coughing observed. Pt requires strict swallow precautions: slow rate, 1-1 assist with feeds, upright, small bites/sips, and alternate consistencies. Medications should be crushed with puree.

## 2021-06-08 NOTE — PROGRESS NOTES
PRIYA RN Trauma Coordinator:    Writer noted  currently working with [facility] regarding discharge back to [place].     Plan - will continue to follow along with social workers plan.    Latesha Shanks RN  Trauma Coordinator     [facility]  [place]  [address]   [place] [address]  T: [phone]  C. [phone]  F: [phone]  E: [email or web address]

## 2021-06-08 NOTE — DISCHARGE SUMMARY
[facility]    Discharge Summary  Hospitalist    Date of Admission:  6/3/2021  Date of Discharge:  6/8/2021  Discharging Provider: Shannon Greene MD    Discharge Diagnoses   1. R intertrochanteric fracture of L hip on 6/4/2021  2. Blood loss anemia from surgery  3. Hypertension, uncontrolled  4. Dementia  5. Dysphagia  6. Hx CKD  7. H/o CVA    Chief Complaint: Leg Injury    History of Present Illness   Rosie Guerrero is a 89 year old female with history of dementia, CVA, arthritis, and chronic kidney disease who presents with leg injury. Per EMS, the patient reportedly had a witnessed fall in her care facility this evening. The facility reported to EMS that the patient did not hit or head or lose consciousness. The patient arrives with a bruise and small laceration to her left forehead. The facility did an x-ray that demonstrated left hip fracture, so she was sent to the ED for further evaluation. She received 100 mcg Fentanyl en route. The patient says she has pain to her left hip but denies any other pains, including neck pain.       Hospital Course   Rosie Guerrero was admitted on 6/3/2021.  The following problems were addressed during her hospitalization:    R intertrochanteric fracture of L hip on 6/5/2021  With fall at facility evening of presentation. No reported head trauma or LOC, unclear if witnessed.  CT head negative. XR pelvis with comminuted intertrochanteric fracture of the L hip. R wrist imaging without fracture.  - orthopedic surgery following  - WBAT x 6 weeks with walker, asa 325 mg x 6 week for DVT prophylaxis  - follow up in 2 weeks for dressing removal and with orthopedic surgeon in 6 weeks.  - to TCU on 6/8  - scheduled tylenol to help control pains     Leukocytosis  WBC 12.3 but decreased, UA bland. Afebrile. Likely reactive.  - repeat labs in am shows wbc 12.3 >> 11.6 >> 9 >> 8.7     Blood loss anemia  -- hgb 12.1 (April 21) >> 10.6 >> 8.2 > 7.1  -- will  check b12/folate levels ok  -- notes severly low Fe of 6  -- received pRBC & venofer in hospital  - Hgb at discharge stable 8.4  - ferrous sulfate at d/c     Dementia  Rule out dysphagia  Lives at [facility].   - speech consult appreciated and she is on dysphagia diet  - Maintain normal day/night, sleep wake cycles  - Minimize sedating/altering medications as able     HTN- uncontrolled  HLD  BP remains uncontrolled. Added Amlodipine 5 mg/day this morning SBP improved to 150  - discharge on Amlodipine 2.5 mg/day, monitor BP after discharge & titrate up the dose if needed     H/o CVA  [reportedly aspirin]  - resume full dose asa 325 mg for DVT prophylaxis     CKD  4/2021 creatinine 1.28. on presentation at 1.24  - avoid nephrotoxins  - monitor creatinine     COVID-19 negative on admission  Shannon Greene MD    Significant Results and Procedures    s/p Left hip cephalomedullary nailing, femur fracture on 6/4/2021 by  Tesfaye Muñoz MD        Pending Results   These results will be followed up by MD at TCU/PCP  Unresulted Labs Ordered in the Past 30 Days of this Admission     Date and Time Order Name Status Description    6/3/2021 2331 25 Hydroxyvitamin D2 and D3 In process           Code Status   DNR / DNI       Primary Care Physician   Deena Main    Physical Exam   Temp: 97.2  F (36.2  C) Temp src: Axillary BP: (!) 194/91 Pulse: 66   Resp: 16 SpO2: 97 % O2 Device: None (Room air)    Vitals:    06/06/21 0420   Weight: 51.4 kg (113 lb 5 oz)     Vital Signs with Ranges  Temp:  [97.2  F (36.2  C)-98.6  F (37  C)] 97.2  F (36.2  C)  Pulse:  [66-74] 66  Resp:  [16-24] 16  BP: (150-194)/(62-93) 194/91  SpO2:  [94 %-97 %] 97 %  I/O last 3 completed shifts:  In: 340 [P.O.:340]  Out: 700 [Urine:700]    Examination:  Well-built well-nourished. In NAD  Heart: Regular rhythm. S1S2, no murmurs, rubs,gallops  Lungs: Clear to auscultation. And no rhonchi rales or wheezing heard.  Abdomen: Soft and nontender. Bowel  sounds present.  Extremities: No swelling.  Neuro:A,A,Ox3, motor sensory grossly intact    Discharge Disposition   Discharged to short-term care facility  Condition at discharge: Stable    Consultations This Hospital Stay   ORTHOPEDIC SURGERY IP CONSULT  PHYSICAL THERAPY ADULT IP CONSULT  OCCUPATIONAL THERAPY ADULT IP CONSULT  CARE MANAGEMENT / SOCIAL WORK IP CONSULT  PHYSICAL THERAPY ADULT IP CONSULT  OCCUPATIONAL THERAPY ADULT IP CONSULT  SWALLOW EVAL SPEECH PATH AT BEDSIDE IP CONSULT  SWALLOW EVAL SPEECH PATH AT BEDSIDE IP CONSULT    Time Spent on this Encounter   IShannon MD, MD, personally saw the patient today and spent greater than 30 minutes discharging this patient.    Discharge Orders      Wound care    Site:   Left hip  Instructions:  Ice to left hip.  Keep dressings clean and dry, okay to remove at 2 weeks.     Follow Up and recommended labs and tests    Follow up with Dr. Muñoz in clinic in 6 weeks.  Please call the clinic to schedule an appointment. [facility] [address] [place] [address] Phone: [phone].    [facility] has Walk-In Clinics daily from 8am-8pm 7days a week.     Weight bearing status    WBAT left lower extremity     Physical Therapy Adult Consult    Evaluate and treat as clinically indicated.    Reason:  S/p ORIF left hip     Occupational Therapy Adult Consult    Evaluate and treat as clinically indicated.    Reason:  S/p ORIF left hip     Fall precautions     Crutches DME    DME Documentation: Describe the reason for need to support medical necessity: Impaired gait status post hip surgery. I, the undersigned, certify that the above prescribed supplies are medically necessary for this patient and is both reasonable and necessary in reference to accepted standards of medical practice in the treatment of this patient's condition and is not prescribed as a convenience.     Patrick DME    DME Documentation: Describe the reason for  need to support medical necessity: Impaired gait status post hip surgery. I, the undersigned, certify that the above prescribed supplies are medically necessary for this patient and is both reasonable and necessary in reference to accepted standards of medical practice in the treatment of this patient's condition and is not prescribed as a convenience.     Walker DME    : DME Documentation: Describe the reason for need to support medical necessity: Impaired gait status post hip surgery. I, the undersigned, certify that the above prescribed supplies are medically necessary for this patient and is both reasonable and necessary in reference to accepted standards of medical practice in the treatment of this patient's condition and is not prescribed as a convenience.     Discharge Medications   Current Discharge Medication List      START taking these medications    Details   senna-docusate (SENOKOT-S/PERICOLACE) 8.6-50 MG tablet Take 1 tablet by mouth 2 times daily as needed for constipation  Qty: 60 tablet, Refills: 0    Associated Diagnoses: Closed fracture of left hip, initial encounter (H)         CONTINUE these medications which have CHANGED    Details   acetaminophen (TYLENOL) 325 MG tablet Take 2 tablets (650 mg) by mouth 4 times daily as needed for mild pain  Qty: 60 tablet, Refills: 0    Associated Diagnoses: Closed fracture of left hip, initial encounter (H)      aspirin (ASA) 325 MG EC tablet Take 1 tablet (325 mg) by mouth daily  Qty: 45 tablet, Refills: 0    Associated Diagnoses: Closed fracture of left hip, initial encounter (H)         CONTINUE these medications which have NOT CHANGED    Details   calcium carbonate (OS-PATRICIA 500 MG Andreafski. CA) 500 MG tablet Take 500 mg by mouth daily      coenzyme Q-10 10 MG CAPS Take 10 mg by mouth daily   Qty: 30 capsule      magnesium oxide (MAG-OX) 400 (240 Mg) MG tablet Take 400 mg by mouth daily      multivitamin w/minerals (THERA-VIT-M) tablet Take 1 tablet by mouth  daily         STOP taking these medications       polyethylene glycol (MIRALAX) 17 g packet Comments:   Reason for Stopping:             Allergies   Allergies   Allergen Reactions     Lipitor [Hmg-Coa-R Inhibitors]      Severe leg cramps     Penicillin [Penicillins]      Sulfa Drugs      Data   Most Recent 3 CBC's:  Recent Labs   Lab Test 06/08/21  0641 06/07/21  0626 06/06/21  0542 06/05/21  0540   WBC  --  8.7 9.0 11.6*   HGB 8.4* 8.4* 7.1* 8.2*   MCV  --  95 95 96   PLT  --  193 166 200      Most Recent 3 BMP's:  Recent Labs   Lab Test 06/07/21  0626 06/06/21  0542 06/05/21  0540    140 137   POTASSIUM 4.1 4.3 4.7   CHLORIDE 110* 109 108   CO2 27 27 25   BUN 31* 31* 28   CR 1.04 1.17* 1.24*   ANIONGAP 3 4 4   PATRICIA 8.9 8.6 9.1   * 114* 124*     Most Recent 2 LFT's:  Recent Labs   Lab Test 11/02/20  1209 10/17/16  1002   AST 24 19   ALT 22 22   ALKPHOS 78 89   BILITOTAL 0.6 0.6     Most Recent INR's and Anticoagulation Dosing History:  Anticoagulation Dose History     Recent Dosing and Labs Latest Ref Rng & Units 6/3/2021    INR 0.86 - 1.14 0.99        Most Recent 3 Troponin's:  Recent Labs   Lab Test 06/03/21  2331 11/02/20  1209 01/10/17  0305   TROPI <0.015 0.020 <0.015  The 99th percentile for upper reference range is 0.045 ug/L.  Troponin values in   the range of 0.045 - 0.120 ug/L may be associated with risks of adverse   clinical events.       Most Recent Cholesterol Panel:  Recent Labs   Lab Test 01/09/17 1922   CHOL 221*   *   HDL 79   TRIG 74     Most Recent 6 Bacteria Isolates From Any Culture (See EPIC Reports for Culture Details):  Recent Labs   Lab Test 11/02/20  1210 01/09/17  1330   CULT No growth 10,000 to 50,000 colonies/mL mixed urogenital rosalva Susceptibility testing not   routinely done       Most Recent TSH, T4 and A1c Labs:  Recent Labs   Lab Test 01/09/17  1922   TSH 2.61   A1C 5.5     Results for orders placed or performed during the hospital encounter of 06/03/21   Head  CT w/o contrast    Narrative    EXAM: CT HEAD W/O CONTRAST  LOCATION: [facility]  DATE/TIME: 6/4/2021 12:35 AM    INDICATION: Traumatic injury. Pain. Dementia.  COMPARISON: 11/02/2020  TECHNIQUE: Routine CT Head without IV contrast. Multiplanar reformats. Dose reduction techniques were used.    FINDINGS:  INTRACRANIAL CONTENTS: No intracranial hemorrhage, extraaxial collection, or mass effect.  No CT evidence of acute infarct. Severe presumed chronic small vessel ischemic changes. Chronic infarct right parietal lobe. Moderate generalized volume loss. No   hydrocephalus.     VISUALIZED ORBITS/SINUSES/MASTOIDS: No intraorbital abnormality. No paranasal sinus mucosal disease. No middle ear or mastoid effusion.    BONES/SOFT TISSUES: No acute abnormality.      Impression    IMPRESSION:  1.  No acute intracranial process.   XR Wrist Right G/E 3 Views    Narrative    EXAM: XR WRIST RT G/E 3 VW  LOCATION: [facility]  DATE/TIME: 6/4/2021 12:21 AM    INDICATION: Pain swelling  COMPARISON: None.      Impression    IMPRESSION: No fractures identified. All views of the hand in slight radial deviation which is suboptimal for evaluating the navicular bone. No fractures are suggested but if there is suspicion for navicular fracture suggested dedicated navicular view.    Degenerative changes at base of thumb, first CMC joint. Chondrocalcinosis involving the TFCC.   XR Pelvis w Hip Left 1 View    Narrative    EXAM: XR PELVIS AND HIP LEFT 1 VIEW  LOCATION: [facility]  DATE/TIME: 6/4/2021 12:21 AM    INDICATION: Pain swelling  COMPARISON: 11/2/2020      Impression    IMPRESSION: Comminuted intertrochanteric fracture of left hip. There is 90 degrees of varus angulation at the fracture site. The greater and lesser trochanters may be a separate fracture fragment.   XR Surgery JIL Fluoro L/T 5 Min w Stills    Narrative    XR SURGERY JIL FLUORO LESS THAN 5 MIN W STILLS 6/4/2021 2:30 PM      COMPARISON: None    HISTORY: left hip pinning    NUMBER OF IMAGES ACQUIRED: 8    VIEWS: AP and oblique views of the left proximal left femur    FLUOROSCOPY TIME: .6 minute(s)      Impression    IMPRESSION: 8 spot intraoperative fluoroscopic views demonstrate  placement of a dynamic screw and intramedullary gumaro spanning the  proximal left femur. Alignment appears near-anatomic. No evidence of  periprostatic fracture. For full details, please refer to the  surgeon's operative report.    NASREEN ONEIL MD

## 2021-06-09 NOTE — PLAN OF CARE
Physical Therapy Discharge Summary    Reason for therapy discharge:    Discharged to transitional care facility.    Progress towards therapy goal(s). See goals on Care Plan in [facility] electronic health record for goal details.  Goals not met.  Barriers to achieving goals:   discharge from facility.    Therapy recommendation(s):    Continued therapy is recommended.  Rationale/Recommendations:  To progress independence and safety with functional mobility.

## 2021-06-09 NOTE — PROGRESS NOTES
[place] [facility]  PRIMARY CARE PROVIDER AND CLINIC:  Deena Main, ZAY CNP, [address] / [place] [address]  Chief Complaint   Patient presents with     Hospital F/U     Establish Care     [place] Medical Record Number:  [id number]  Place of Service where encounter took place:  [facility] () [[id number]]    Rosie Guerrero  is a 89 year old  (5/25/1928), admitted to the above facility from  [facility]. Hospital stay 6/3/21 through 6/8/21..  Admitted to this facility for  rehab, medical management and nursing care.     Fall, subsequent encounter  Closed nondisplaced intertrochanteric fracture of left femur with routine healing, subsequent encounter  Pain of left thigh  Anemia associated with acute blood loss  Physical deconditioning  Dementia without behavioral disturbance, unspecified dementia type (H)  Essential hypertension  Cerebrovascular accident (CVA), unspecified mechanism (H)  Dysphagia, unspecified type  Stage 3a chronic kidney disease  Macular degeneration (senile) of retina      HPI:    HPI information obtained from: facility chart records, facility staff and [facility] chart review.   Brief Summary of Hospital Course: pt fell on 6/3--she ambulated with walker independently and lives on the [facility] floor.  She had pain so was sent to the hospital, Fracture of the left intertrochanteric region. Sent to surgery, pin placed on 6.4/21-- had some dysphagia so seen by SLP and put on dysphagia diet.  Fe was noted to be very low--6--so got Rbcs and Venofer. She had a high BP so placed on norvasc which she was sent to rehab on.    TODAY DURING EXAM HPI and ROS: limited due to dementia and pt dozing. Says  No CP, SOB.  Appetite is down.  No pain except left hip leg, janell with movements      CODE STATUS/ADVANCE DIRECTIVES DISCUSSION:   No CPR- Pre-arrest intubation OK  Patient's living condition: lives in a skilled nursing  facility  ALLERGIES: Lipitor [hmg-coa-r inhibitors], Penicillin [penicillins], and Sulfa drugs  PAST MEDICAL HISTORY:  has a past medical history of Generalized osteoarthrosis, unspecified site, Macular degeneration (senile) of retina, unspecified, Other and unspecified hyperlipidemia, Other malignant neoplasm of skin of trunk, except scrotum, Personal history of fall (11/2020), and Rubella without mention of complication.  PAST SURGICAL HISTORY:   has a past surgical history that includes hysterectomy, fuad (1970); REMOVE TONSILS/ADENOIDS,<11 Y/O (1934); COLONOSCOPY THRU STOMA, DIAGNOSTIC (1994); flex sig (medicare pt.) (1996); and Open reduction internal fixation hip nailing (Left, 6/4/2021).  FAMILY HISTORY: family history includes Cerebrovascular Disease in her father; Neurologic Disorder in her mother.  SOCIAL HISTORY:   reports that she quit smoking about 47 years ago. She has never used smokeless tobacco. She reports current alcohol use. She reports that she does not use drugs.    Post Discharge Medication Reconciliation Status: discharge medications reconciled and changed, per note/orders     Current Outpatient Medications   Medication Sig Dispense Refill     acetaminophen (TYLENOL) 325 MG tablet Take 3 tablets (975 mg) by mouth 3 times daily       amLODIPine (NORVASC) 2.5 MG tablet Take 1 tablet (2.5 mg) by mouth daily (Patient taking differently: Take 2.5 mg by mouth daily HOLD if SBP <120 and notify me)       aspirin (ASA) 325 MG EC tablet Take 1 tablet (325 mg) by mouth daily 45 tablet 0     calcium carbonate (TUMS) 500 MG chewable tablet Take 1 chew tab by mouth 2 times daily With meals.       ferrous sulfate 300 (60 Fe) MG/5ML syrup Take 5 mLs (300 mg) by mouth daily       magnesium oxide (MAG-OX) 400 (240 Mg) MG tablet Take 400 mg by mouth daily       Menthol, Topical Analgesic, (ICY HOT EX) %5 cream apply to L hip incisional area for pain       multivitamin w/minerals (THERA-VIT-M) tablet Take 1  "tablet by mouth daily       OTHER MEDICAL SUPPLIES Patient needs up to for meals. Please encourage po       senna-docusate (SENOKOT-S/PERICOLACE) 8.6-50 MG tablet Take 1 tablet by mouth 2 times daily as needed for constipation 60 tablet 0           ROS:see above under HPI    Vitals:  BP (!) 157/70   Pulse 71   Temp 97.4  F (36.3  C)   Resp 18   Ht 1.727 m (5' 8\")   Wt 45.4 kg (100 lb)   SpO2 93%   BMI 15.20 kg/m    Exam: dozing in bed, NAD, \"go away\"  ENT:  Mouth with moist mucous membranes, [place]  RESP:  respiratory effort  of chest normal, lungs mostly clear with a few coarse BS, non labored.  CV: Auscultation of heart done , RRR, no murmur, rub, or gallop, trace pedal edema left leg  ABDOMEN:  normal bowel sounds, soft,   M/S:   CANNON equally--seen in bed  SKIN:  Inspection of skin and subcutaneous tissue baseline except bruise left side face near eye socket, left thigh/femur with drsg--dry and intact--bruises noted. No redness or drainage  NEURO:   Cranial nerves grossly intact and at patient's baseline  PSYCH:  insight and judgement impaired, memory impaired ,       Lab/Diagnostic data:  Recent Labs   Lab Test 06/07/21  0626 06/06/21  0542    140   POTASSIUM 4.1 4.3   CHLORIDE 110* 109   CO2 27 27   ANIONGAP 3 4   * 114*   BUN 31* 31*   CR 1.04 1.17*   PATRICIA 8.9 8.6     CBC RESULTS:   Recent Labs   Lab Test 06/08/21  0641 06/07/21  0626   WBC  --  8.7   RBC  --  2.72*   HGB 8.4* 8.4*   HCT  --  25.9*   MCV  --  95   MCH  --  30.9   MCHC  --  32.4   RDW  --  13.8   PLT  --  193     ASSESSMENT / PLAN:  (W19.XXXD) Fall, subsequent encounter  (primary encounter diagnosis)   (H76.527D) Closed nondisplaced intertrochanteric fracture of left femur with routine healing, subsequent encounter   (A55.209) Pain of left thigh  (R53.81) Physical deconditioning  Comment/Plan: tylenol, icy hot, asa, PT OT, up for meals. monitor    (D62) Anemia associated with acute blood loss  Comment/Plan: check labs on 6/15   "   (F03.90) Dementia without behavioral disturbance, unspecified dementia type (H)  Comment/Plan: in LTC, needs 24 care, monitoring.    (I10) Essential hypertension  Comment/Plan: Norvasc, started at 5mg now down to 2.5 mg and taper as nec. Monitor.    (I63.9) Cerebrovascular accident (CVA), unspecified mechanism (H)  Comment/Plan: asa and BP control    (R13.10) Dysphagia, unspecified type  Comment/Plan: no on dysphagia diet before fall-SLP eval and management/assistance  And up for melas    (N18.31) Stage 3a chronic kidney disease  Comment/Plan: check on 6.15    (H35.30) Macular degeneration (senile) of retina  Comment/Plan: fall risk      Total time spent with patient visit at the skilled nursing facility was 45 min including patient visit, review of past records and phone call to patient contact--msg left with  Guardian Marky Koo with update. Greater than 50% of total time spent with counseling and coordinating care due to complexities of care --see above for data.     Electronically signed by:  ZAY Lafleur CNP

## 2021-06-11 PROBLEM — J96.01 ACUTE RESPIRATORY FAILURE WITH HYPOXIA (H): Status: ACTIVE | Noted: 2021-01-01

## 2021-06-11 PROBLEM — Z98.890 POSTOPERATIVE STATE: Status: ACTIVE | Noted: 2021-01-01

## 2021-06-11 PROBLEM — R05.9 COUGH: Status: ACTIVE | Noted: 2021-01-01

## 2021-06-11 NOTE — ED TRIAGE NOTES
Pt presents by EMS from [facility] with oxygen saturations of 60%. Staff reports pt is baseline nonverbal and pt was found to have a wet, productive cough this afternoon. EMS arrived and found o2 to be 60% and pt was placed on CPAP. Pt arrived on CPAP, eyes closed and coughing. Pt o2 saturations improved on CPAP, tachycardic and switched to bipap on arrival to ED. Pt is DNR/DNI

## 2021-06-11 NOTE — ED PROVIDER NOTES
History   Chief Complaint:  Cough      The history is provided by the EMS personnel.      Rosie Guerrero is a 89 year old female with history of dementia, CK, stroke, and CVA who presents via EMS from [facility] with oxygen saturations of 60% and cough. Per EMS, staff reports patient to be baseline nonverbal and patient was found to have a wet, productive cough this afternoon. Patient arrives on CPAP, eyes closed, and coughing.  No fever or pain. No history of CHF. Patient was fully vaccinated with Moderna in January and March, 2021. Of note, the patient's power  is on their way to the ED.        Review of Systems   Unable to perform ROS: Dementia       Allergies:  Lipitor  Penicillin   Sulfa Drugs    Medications:  Norvasc  Ferrous sulfate  Senokot  Magnesium oxide    Past Medical History:    Osteoarthrosis  Macular degeneration   Hyperlipidemia   Rubella  Dementia  Blepharitis  CVA  CKD stage 3  Stroke     Past Surgical History:    Hysterectomy  Tonsillectomy     Family History:    Dementia    Social History:  Patient presets to the ED via EMS.  Patient lives in care facility.     Physical Exam     Patient Vitals for the past 24 hrs:   BP Temp Temp src Pulse Resp SpO2 Weight   06/11/21 1841 -- -- -- 96 23 97 % --   06/11/21 1830 (!) 141/67 -- -- 90 23 99 % --   06/11/21 1630 (!) 164/143 -- -- 102 (!) 33 95 % --   06/11/21 1615 (!) 144/72 -- -- 103 (!) 31 95 % --   06/11/21 1610 -- 99.1  F (37.3  C) -- -- -- 99 % --   06/11/21 1600 (!) 135/114 -- -- 103 27 -- --   06/11/21 1545 (!) 155/72 -- -- 91 (!) 38 96 % --   06/11/21 1530 (!) 151/131 -- -- 92 21 97 % --   06/11/21 1520 (!) 149/80 -- -- 105 25 100 % --   06/11/21 1515 (!) 149/80 -- -- 105 (!) 68 99 % --   06/11/21 1512 (!) 170/83 96.3  F (35.7  C) Temporal 103 (!) 34 100 % 45.4 kg (100 lb 1.4 oz)   06/11/21 1510 (!) 170/83 -- -- 110 23 91 % --       Physical Exam  Eye:  Pupils are equal, round, and reactive.  Extraocular  movements intact.    ENT:  No rhinorrhea.  Moist mucus membranes.  Normal tongue and tonsil. Cachetic elderly woman frequent cough, thick mucous, unable to provide history.    Cardiac:  Mildly tachycardic but regular.  No murmurs, gallops, or rubs.    Pulmonary:  Patient is typanitic on BiPAP. Bronchi heard through all lung field with frequent cough. accessory muscle use noted.    Abdomen:  Positive bowel sounds.  Abdomen is soft and non-distended, without focal tenderness.    Musculoskeletal:  Normal movement of all extremities without evidence for deficit. No lower extremities edema noted.    Skin:  Warm and dry without rashes.    Neurologic:  Non-focal exam without asymmetric weakness or numbness.     Psychiatric:  Patient is alert and mowing but otherwise non verbal.    Emergency Department Course     ECG:  ECG taken at 1514, ECG read at 1516  Sinus tachycardia. Possible Left atrial enlargement. Nonspecific T wave abnormality. Abnormal ECG.   Rate 106 bpm. WI interval 154 ms. QRS duration 74 ms. QT/QTc 338/448 ms. P-R-T axes 66 50 57.      Imaging:  XR Chest Port 1 View   No acute cardiopulmonary disease. Hyperinflated lungs can  be seen with emphysema.  Reading per radiology.     Laboratory:  CBC: WBC: 19.6 (H) , HGB: 11.0 (L) , PLT: 537 (H)     CMP: Glucose 147 (H) , Chloride: 112 (H) , Anion Gap: 2 (L) , Urea Nitrogen: 44 (H) , GFR: 39 (L) , Calcium: 10.4 (L) , Albumin: 2.6 (L), Creatinine: 1.19 (H)o/w WNL     UA: Protein Albumin: 30 (A) , Mucous: Present, o/w Negative    Troponin (Collected 1528): <0.015    ISTAT gases lactate dianna POCT: pH 7.29 (L)  / PCO2 67 (H)  / PO2 18 (L)  / Bicarb 32 (H) / O2 Sat 21 / Lactic acid 1.4    BNP: 1169    Procalcitonin: 0.32    Blood Cultures x2: Pending    Asymptomatic COVID-19 PCR: Negative       Emergency Department Course:    Reviewed:  I reviewed nursing notes, vitals, past medical history and care everywhere    Assessments:  1503 I obtained history and examined the  patient as noted above.   1535 I reassessed the patient and discussed the results of the evaluation thus far.     Consults:   1623 I consulted with Dr. Avalos, hospitalist, regarding the patient's history and presentation in the ED.     Interventions:  1631 Fentanyl 25 mcg IV  1633 Cefepime 1 g IV  1700 Fentanyl 25 mcg IV    Disposition:  The patient was admitted to the hospital under the care of Dr. Avalos.       Impression & Plan     CMS Diagnoses: None    Medical Decision Making:  This unfortunate and severely demented 89-year-old who was recently admitted to this hospital for a fall with associated left hip fracture status post repair presents to us from her transitional care unit with acute respiratory issues.  From what I can ascertain from the chart, she developed a cough and was short of breath several hours before coming here.  They tried her on some oxygen therapy without improvement, and once EMS was called, the patient was found to be hypoxic as low as the 60s.  She was started on CPAP and did improve in route to us, though continued to have significant coughing.    On my exam, she is a cachectic elderly woman with increased work of breathing and almost constant coughing of thick material.  Her lungs are rhonchorous and she is using accessory muscles.  She is tachycardic.  However, she is afebrile.  Laboratory investigation was pursued which was concerning for an elevated white blood cell count, a mild respiratory acidosis, with a normal lactate and kidney function.  Chest x-ray did not show any evidence of pneumonia or fluid overload.  With her BiPAP therapy, her breathing improved, her heart rate came down, and her coughing decreased.    The patient presents with a POLST form clearly stating that she is DNI DNR though would want standard measures of antibiotics and oxygen therapy which was initiated here.  I covered the patient with his dose of cefepime out of concern for early hospital-acquired  pneumonia.  I spoke with the hospitalist who will admit the patient to the [facility] for ongoing treatment with BiPAP therapy, fluids, and antibiotics.  It is still not exactly clear what triggered this cough and respiratory failure, though I do not feel that further interventions are indicated in the emergency department at this time.        Covid-19  Rosie Guerrero was evaluated during a global COVID-19 pandemic, which necessitated consideration that the patient might be at risk for infection with the SARS-CoV-2 virus that causes COVID-19.   Applicable protocols for evaluation were followed during the patient's care.   COVID-19 was considered as part of the patient's evaluation. The plan for testing is:  a test was obtained during this visit.      Diagnosis:    ICD-10-CM    1. Acute respiratory failure with hypoxia (H)  J96.01 Blood culture     Blood culture     Asymptomatic SARS-CoV-2 COVID-19 Virus (Coronavirus) by PCR   2. Cough  R05    3. Postoperative state  Z98.890        Scribe Disclosure:  I, Regina Huerta, am serving as a scribe at 3:05 PM on 6/11/2021 to document services personally performed by Trierweiler, Chad A, MD based on my observations and the provider's statements to me.            Trierweiler, Chad A, MD  06/11/21 7265

## 2021-06-11 NOTE — PHARMACY-ADMISSION MEDICATION HISTORY
Pharmacy Medication History  Admission medication history interview status for the 6/11/2021  admission is complete. See EPIC admission navigator for prior to admission medications     Location of Interview: Outside patient room but on unit  Medication history sources: MAR ([facility])    Significant changes made to the medication list:  Deleted: magnesium supplement   Changed: tylenol to PRN vs scheduled,    In the past week, patient estimated taking medication this percent of the time: greater than 90%    Additional medication history information:   None    Medication reconciliation completed by provider prior to medication history? No    Time spent in this activity: 15 minutes    Prior to Admission medications    Medication Sig Last Dose Taking? Auth Provider   acetaminophen (TYLENOL) 325 MG tablet Take 3 tablets (975 mg) by mouth 3 times daily  Patient taking differently: Take 975 mg by mouth every 8 hours as needed  6/11/2021 at 1449 Yes Shannon Greene MD   amLODIPine (NORVASC) 2.5 MG tablet Take 1 tablet (2.5 mg) by mouth daily  Patient taking differently: Take 2.5 mg by mouth daily HOLD if SBP <120 and notify me 6/11/2021 at 1055 Yes Shannon Greene MD   aspirin (ASA) 325 MG EC tablet Take 1 tablet (325 mg) by mouth daily 6/11/2021 at 1055 Yes Foss, Kjerstin L, PA-C   calcium carbonate (TUMS) 500 MG chewable tablet Take 1 chew tab by mouth 2 times daily With meals. 6/11/2021 at 1055 Yes Reported, Patient   ferrous sulfate 300 (60 Fe) MG/5ML syrup Take 5 mLs (300 mg) by mouth daily 6/11/2021 at 1055 Yes Shannon Greene MD   guaiFENesin (ROBITUSSIN) 100 MG/5ML SYRP Take 20 mLs by mouth every 4 hours as needed for cough 6/11/2021 at 1449 Yes Unknown, Entered By History   Menthol, Topical Analgesic, (ICY HOT EX) %5 cream apply to L hip incisional area for pain 6/11/2021 at 1254 Yes Reported, Patient   multivitamin w/minerals (THERA-VIT-M) tablet Take 1  tablet by mouth daily 6/11/2021 at 1055 Yes Unknown, Entered By History   senna-docusate (SENOKOT-S/PERICOLACE) 8.6-50 MG tablet Take 1 tablet by mouth 2 times daily as needed for constipation 6/10/2021 at 1519 Yes Foss, Kjerstin L, PA-C       The information provided in this note is only as accurate as the sources available at the time of update(s)

## 2021-06-11 NOTE — ED NOTES
Bed: ST  Expected date: 6/11/21  Expected time: 2:53 PM  Means of arrival: Ambulance  Comments:  513 89f pneumonia resp failure ETA 1500

## 2021-06-11 NOTE — TELEPHONE ENCOUNTER
[facility] Nurse Triage Telephone Note    Provider: Corina COLLAZO CNP  Facility: [facility]  Facility Type:  TCU    Caller: Susannah  Call Back Number: [phone]    Allergies:    Allergies   Allergen Reactions     Lipitor [Hmg-Coa-R Inhibitors]      Severe leg cramps     Penicillin [Penicillins]      Sulfa Drugs         Reason for call: Nurse reporting that patient has been coughing for the last hour constantly.  She's has very thick secretions but she's unable to clear or expectorate secretions.  VS:  T=97.0, P=78, R=22, UV=454/77, O2=80%RA---84% on 3L/min.      Verbal Order/Direction given by Provider: Send patient to the ER due to coughing and hypoxia.      Provider giving Order:  Jaida Trinidad MD    Verbal Order given to: Susannah Stout RN

## 2021-06-12 NOTE — PROGRESS NOTES
Pt trialed off Bipap at 21:05. Placed on 6 LPM oxymask, tolerating well so far. SpO2 100%, RR 22-24. Follow-up [facility] in AM? RN notified, will continue to follow.     GISELE Castaneda, RRT

## 2021-06-12 NOTE — PROGRESS NOTES
New admit from ED. Lethargic since arrival. Mumbles softly when asked questions. Non-verbal at baseline, per report. Does not follow direction. Not impulsive except occasional restlessness. Bed alarm on. All tubes and drains secured. On BIPAP until 2100. On 7L Oxymask since 2100. SPO2 > 95% so far. No SOB. Breathing unlabored. No distress. Lungs diminished (R>L). No wheezing. Off IVF. Still on IV ABX. Tele: [facility] updated and aware. No fever. BP stable.

## 2021-06-12 NOTE — PROGRESS NOTES
"   06/12/21 0915   Quick Adds   Type of Visit Initial PT Evaluation   Living Environment   Current Living Arrangements assisted living;extended care facility   Home Accessibility no concerns   Transportation Anticipated health plan transportation   Living Environment Comments Lives at [facility]   Self-Care   Usual Activity Tolerance fair   Current Activity Tolerance poor   Regular Exercise No   Equipment Currently Used at Home walker, rolling   Activity/Exercise/Self-Care Comment Prior to last hospital admission, per chart-last PT eval on 6/5/21 \"Pt reports getting assistance w/ all ADLs including bathing and dressing. Pt reports using a FWW for all mobility. Pt states stairs are difficult for her to negotiate. Pt states she was able to ambulate ~30' prior to admittance w/ a FWW before needing a rest break.\"   Disability/Function   Number of times patient has fallen within last six months 2  (per chart)   Change in Functional Status Since Onset of Current Illness/Injury yes   General Information   Onset of Illness/Injury or Date of Surgery 06/11/21   Referring Physician Yunior Avalos MD   Patient/Family Therapy Goals Statement (PT) none stated.    Pertinent History of Current Problem (include personal factors and/or comorbidities that impact the POC) 89y/o F admitted with SOB and cough, found to have acute hypoxic resp failure, aspiration pneumonia, recent right intertrochanteric fx of L hip on 6/5/2021, s/p internal fixation with WBAT x 6 weeks with walker order. Pt also with hx of HTN, CVA, dementia and dysphagia.    Existing Precautions/Restrictions fall   Weight-Bearing Status - LLE weight-bearing as tolerated   Weight-Bearing Status - RLE full weight-bearing   General Observations pt frail elderly F, 1 L Oxymask, RN present at [facility], pt coughing, on IMC monitoring.    Cognition   Orientation Status (Cognition) disoriented to;place;situation   Cognitive Status Comments hx of dementia, minimal " verbalizations, forgetful with conversation, requires repetition and simple commands for task completion   Pain Assessment   Patient Currently in Pain Yes, see Vital Sign flowsheet  (non specific )   Posture    Posture Forward head position;Protracted shoulders   Range of Motion (ROM)   ROM Quick Adds ROM deficits secondary to surgical procedure;ROM deficits secondary to pain;ROM deficits secondary to swelling;ROM deficits secondary to weakness   ROM Comment ROM limitation in BLEs due to weakness. Limitations in L LE due to weakness and surgical procedure.   Strength   Manual Muscle Testing Quick Adds Deficits observed during functional mobility   Strength Comments pt able to lift bilateral UE slowly against gravity, pt needing assist with all mobility, limited formal MMT due to impaired cognition with command following    Bed Mobility   Comment (Bed Mobility) max A x 2    Transfers   Transfer Safety Comments unable to initiate standing due to weakness and pt refusing attempts, required utilization of ceiling lift for EOB to recliner transfer   Gait/Stairs (Locomotion)   Comment (Gait/Stairs) pt unable at this time   Balance   Balance Comments min A 1-2 with static sitting balance at the EOB   Clinical Impression   Criteria for Skilled Therapeutic Intervention yes, treatment indicated   PT Diagnosis (PT) impaired transfers   Influenced by the following impairments Decreased activity tolerance; decreased balance; decreased strength; increased pain, impaired cognition   Functional limitations due to impairments Impaired functional mobility   Clinical Presentation Evolving/Changing   Clinical Presentation Rationale based on medical status, cognition, clinical judgement   Clinical Decision Making (Complexity) moderate complexity   Therapy Frequency (PT) 3x/week   Predicted Duration of Therapy Intervention (days/wks) 1 week   Planned Therapy Interventions (PT) balance training;bed mobility training;gait training;home  exercise program;patient/family education;strengthening;ROM (range of motion);transfer training   Risk & Benefits of therapy have been explained evaluation/treatment results reviewed;care plan/treatment goals reviewed;risks/benefits reviewed;current/potential barriers reviewed;participants included;participants voiced agreement with care plan;patient   PT Discharge Planning    PT Discharge Recommendation (DC Rec) Transitional Care Facility   PT Rationale for DC Rec Pt continues to be well below her baseline, currently requiring max A x 2 for bed mobility and ceiling lift for transfers to the chair. Pt would benefit from continued PT to improve activity tolerance, balance and IND with safety and functional mobility   PT Brief overview of current status  recommend nursing staff utilize lift for transfers up to chair.    Total Evaluation Time   Total Evaluation Time (Minutes) 10

## 2021-06-12 NOTE — PROGRESS NOTES
"Clinical Swallow Evaluation     06/12/21 1042   General Information   Onset of Illness/Injury or Date of Surgery 06/11/21   Referring Physician Dr. Avalos   Patient/Family Therapy Goal Statement (SLP) normal water   Pertinent History of Current Problem \"Rosie Guerrero is a 89 year old female with past medical history of dementia, hypertension, hyperlipidemia, CVA, chronic kidney disease who presents for evaluation of shortness of breath and cough. Patient currently resides at [facility] following a hip injury.  Staff reports that at baseline it appears that she is mostly nonverbal.  On the day of admission, she was found to have a wet productive cough in the afternoon.  Given her ill-appearing state, EMS was activated and the patient was found to be hypoxic with O2 saturations in the 60s, and was placed on CPAP and transferred to [facility] emergency department for further evaluation.   General Observations Pt alert in chair; limited interaction   Past History of Dysphagia SLP evaluation on 6/6/21 during previous admission for hip fracture, surgery on 6/4. No previous dysphagia noted. Evaluation limited by pt particiation and pain/inability to sit upright. DD1 and nectar thick liquids/meds crushed recommended at that time. Follow up on 6/8 noted coughing on 50% of purees that improved with liquid rinse. Strict swallow precautions recommended. Pt currently NPO for SLP evaluation.    Type of Evaluation   Type of Evaluation Swallow Evaluation   Oral Motor   Oral Musculature unable to assess due to poor participation/comprehension   General Swallowing Observations   Current Diet/Method of Nutritional Intake (General Swallowing Observations, NIS) NPO   Respiratory Support (General Swallowing Observations) oxygen mask  (4L)   Swallowing Evaluation Clinical swallow evaluation   Comment, Secretions/Suctioning thick; wet vocal quality   Swallowing Recommendations   Diet Consistency Recommendations NPO " "  Medication Administration Recommendations, Swallowing (SLP) crushed in honey thick liquid   Instrumental Assessment Recommendations VFSS (videofluroscopic swallowing study)  (if/when pt can tolerate)   Comment, Swallowing Recommendations SLP: Pt presents with moderate to severe dysphagia on Clinical Swallow Evaluation. Pt alert immediately following PT session and sitting upright in a chair. Pt requesting \"normal\" water. Frequent coughing prior to PO intake. Moderate amounts of thick/stringy secretions and oral residue from morning meds crushed removed with oral cares. With additional oral cares and swabs in water, pt was able to produce large amounts of thick phelgm and manually removed by SLP. Pt accepted ice chips x2 with increase in coughing frequency and O2 dipping into the high 80s. Pt quickly recovered and requested water. Honey thick water by spoon x2 trialed with good timing, but difficult to determine if coughing is related to baseline secretions/aspiration/pharyngeal residue. Pt quickly fatigued and was moaning. RN notified of suspsected pain. Would continue NPO today with frequent oral cares with swabs dipped in thin water. Encourage pt to cough and clear secretions. Okay for essential meds crushed in honey thick liquids and sips of honey thick ice water for comfort. Anticipate need for Video Swallow Study, however, will be difficult given limited activity tolerance/fatigue and limited tolerance for sitting in a chair. Will continue to follow daily.    General Therapy Interventions   Planned Therapy Interventions Dysphagia Treatment   Dysphagia treatment Instruction of safe swallow strategies;Modified diet education   SLP Therapy Assessment/Plan   Criteria for Skilled Therapeutic Interventions Met (SLP Eval) yes;treatment indicated   SLP Diagnosis Moderate to severe dysphagia   Rehab Potential (SLP Eval) fair, will monitor progress closely   Therapy Frequency (SLP Eval) daily   Predicted Duration of " Therapy Intervention (SLP Eval) 1 week   SLP Discharge Planning    SLP Discharge Recommendation (DC Rec) Transitional Care Facility   SLP Rationale for DC Rec Pt well below baseline of regular/thin diet   SLP Brief overview of current status  Would continue NPO today with frequent oral cares with swabs dipped in thin water. Encourage pt to cough and clear secretions. Okay for essential meds crushed in honey thick liquids and sips of honey thick ice water for comfort.     Total Evaluation Time   Total Evaluation Time (Minutes) 25

## 2021-06-12 NOTE — PROGRESS NOTES
MD Notification    Notified Person: MD    Notified Person Name: Dr. Ndiaye    Notification Date/Time: 6/12, 0025    Notification Interaction: paged, call back    Purpose of Notification: pt moaning and in pain, only has PO Tylenol, cant safely swallow    Orders Received: Rectal Tylenol and IV Dilaudid ordered    Comments:

## 2021-06-12 NOTE — PLAN OF CARE
0824-2047: Disoriented x4, does not follow commands. Lethargic overnight. VSS, Oxymask @ 5 L. Was tachy (110-120s), but improved (70-80s). Assist of 2. NPO. Lung sounds diminished. Bowel sounds active. Noe in place, low UOP, bolus started. L hip incision NAVNEET. Moans at times. Tele: .

## 2021-06-12 NOTE — PROVIDER NOTIFICATION
Brief update:    Paged re:  Low urine output    NPO for dysphagia, has hooks in.   150 ml in 8h    1L NS over 2h    Tres Ndiaye MD  6:29 AM

## 2021-06-12 NOTE — PLAN OF CARE
"OT: pt admitted from LTC facility, receives assist with all ADL\"s due to advanced dementia. Current needs being addressed by PT, will defer OT eval at this time.   "

## 2021-06-12 NOTE — H&P
[facility]    History and Physical - Hospitalist Service       Date of Admission:  6/11/2021    Assessment & Plan     Rosie Guerrero is a 89 year old female admitted on 6/11/2021. She presents with SOB and cough.     Acute Hypoxic Resp Failure  Aspiration Pneumonia  Assessment: Presents with altered mental status in the setting of hypoxia/shortness of breath and a cough.  Found to be significantly hypoxic with O2 saturations in the 60s, requiring BiPAP.  On admission labs are pertinent for a troponin that is within normal lites, WBC 19.6, creatinine 1.19.  Her VBG admission showed a pH of 7.29, PCO2 67, bicarb 32.  Overall clinical presentation is suspicious for likely aspiration pneumonia given her history of dysphagia. Chest x-ray that is without significant pulmonary edema  Plan:  - admit to [facility]  - Continue BiPAP  - Continuous oximetry  - IV Cefepime   - NPO  - DNR / DNI  - POLST on file -> Avoid ICU  - Follow vitals/temp     R intertrochanteric fracture of L hip on 6/5/2021  Last admission patient had a fall at facility evening of presentation. No reported head trauma or LOC, unclear if witnessed.  CT head negative. XR pelvis with comminuted intertrochanteric fracture of the L hip. R wrist imaging without fracture. S/p INTERNAL FIXATION, FRACTURE, TROCHANTERIC HIP 06/04.  Plan:  - WBAT x 6 weeks with walker  - asa 325 mg x 6 week for DVT prophylaxis once able to tolerate PO  - Heparin ppx for now  - follow up in 2 weeks for dressing removal and with orthopedic surgeon in 6 weeks.  - pain control as needed     Blood loss anemia  Assessment: hgb stable on admission 11.0, no evidence of blood loss  Plan:  -Monitor as needed     Dementia  Dysphagia  Lives [facility]   - speech consulted last admission and she is on dysphagia diet  - Maintain normal day/night, sleep wake cycles  - Minimize sedating/altering medications as able     HTN- uncontrolled  HLD  BP remains uncontrolled. Added  Amlodipine 5 mg/day this morning SBP improved to 150  Plan:   - Hold PTA anti-htns      H/o CVA  [reportedly aspirin]  - resume full dose asa 325 mg for DVT prophylaxis once mental status improves     CKD  Cr on admission stable at 1.19  - avoid nephrotoxins  - monitor creatinine          Diet: NPO for Medical/Clinical Reasons Except for: Meds, Ice Chips    DVT Prophylaxis: Pneumatic Compression Devices  Noe Catheter: in place, indication:    Code Status: No CPR- Do NOT Intubate           Disposition Plan   Expected discharge: 2 - 3 days, recommended to prior living arrangement once O2 use less than 2 liters/minute.  Entered: Yunior Avalos MD 06/11/2021, 7:59 PM     The patient's care was discussed with the Patient and ED Provider.    Yunior Avalos MD  [facility]  Contact information available via [facility] Paging/Directory    I spent 45 minutes of critical care time on the unit/floor managing the care of Rosie Guerrero. Upon evaluation, this patient had a high probability of imminent or life-threatening deterioration due to resp failure requiring NIPPV, which required my direct attention, intervention, and personal management. 50% of my time was spent at the bedside counseling the patient and/or coordinating care regarding services listed in this note.    ______________________________________________________________________    Chief Complaint     SOB  Cough  AMS    History is obtained from the EMR/ED provider    History of Present Illness     Rosie Guerrero is a 89 year old female with past medical history of dementia, hypertension, hyperlipidemia, CVA, chronic kidney disease who presents for evaluation of shortness of breath and cough.    HPI is limited secondary to patient's altered mental status    Patient currently resides at [facility] following a hip injury.  Staff reports that at baseline it appears that she is mostly nonverbal.  On the day of admission, she was  found to have a wet productive cough in the afternoon.  Given her ill-appearing state, EMS was activated and the patient was found to be hypoxic with O2 saturations in the 60s, and was placed on CPAP and transferred to [facility] emergency department for further evaluation.  Patient does not verbalize any complaints, she denies any current chest pain, no fevers or chills reported by her TCU.  She is fully vaccinated with Moderna for COVID-19.    Review of Systems      The 10 point Review of Systems is negative other than noted in the HPI or here.     Past Medical History    I have reviewed this patient's medical history and updated it with pertinent information if needed.   Past Medical History:   Diagnosis Date     Generalized osteoarthrosis, unspecified site     Hx of cervicalgia, and sciatica, resolved with PT     Macular degeneration (senile) of retina, unspecified      Other and unspecified hyperlipidemia      Other malignant neoplasm of skin of trunk, except scrotum     Multiple Basal cell CA excisions     Personal history of fall 11/2020     Rubella without mention of complication     Past history of Rubella       Past Surgical History   I have reviewed this patient's surgical history and updated it with pertinent information if needed.  Past Surgical History:   Procedure Laterality Date     FLEX SIG (MEDICARE PT.)  1996    NORMAL     HC COLONOSCOPY THRU STOMA, DIAGNOSTIC  1994    NORMAL     HC REMOVE TONSILS/ADENOIDS,<13 Y/O  1934     HYSTERECTOMY, KEMI  1970    With BSO, secondary to fibroids     OPEN REDUCTION INTERNAL FIXATION HIP NAILING Left 6/4/2021    Procedure: INTERNAL FIXATION, FRACTURE, TROCHANTERIC, HIP, USING PINS OR RODS;  Surgeon: Tesfaye Muñoz MD;  Location:  OR       Social History   I have reviewed this patient's social history and updated it with pertinent information if needed.  Social History     Tobacco Use     Smoking status: Former Smoker     Quit date: 1/1/1974     Years since  quittin.4     Smokeless tobacco: Never Used   Substance Use Topics     Alcohol use: Yes     Alcohol/week: 0.0 standard drinks     Comment: occassionally     Drug use: No       Family History   I have reviewed this patient's family history and updated it with pertinent information if needed.  Family History   Problem Relation Age of Onset     Neurologic Disorder Mother         Cerebrovascular disease, dementia,  age 97     Cerebrovascular Disease Father          age 79       Prior to Admission Medications   Prior to Admission Medications   Prescriptions Last Dose Informant Patient Reported? Taking?   Menthol, Topical Analgesic, (ICY HOT EX) 2021 at [address] [facility] Yes Yes   Sig: %5 cream apply to L hip incisional area for pain   acetaminophen (TYLENOL) 325 MG tablet 2021 at [address] [facility] No Yes   Sig: Take 3 tablets (975 mg) by mouth 3 times daily   Patient taking differently: Take 975 mg by mouth every 8 hours as needed    amLODIPine (NORVASC) 2.5 MG tablet 2021 at [address] [facility] No Yes   Sig: Take 1 tablet (2.5 mg) by mouth daily   Patient taking differently: Take 2.5 mg by mouth daily HOLD if SBP <120 and notify me   aspirin (ASA) 325 MG EC tablet 2021 at [address] [place] [address] No Yes   Sig: Take 1 tablet (325 mg) by mouth daily   calcium carbonate (TUMS) 500 MG chewable tablet 2021 at [address] [place] [address] Yes Yes   Sig: Take 1 chew tab by mouth 2 times daily With meals.   ferrous sulfate 300 (60 Fe) MG/5ML syrup 2021 at [address] [place] [address] No Yes   Sig: Take 5 mLs (300 mg) by mouth daily   guaiFENesin (ROBITUSSIN) 100 MG/5ML SYRP 2021 at [address] [facility] Yes Yes   Sig: Take 20 mLs by mouth every 4 hours as needed for cough   multivitamin w/minerals (THERA-VIT-M) tablet 2021 at [address] [place] [address] Yes Yes   Sig: Take 1 tablet by mouth daily   senna-docusate (SENOKOT-S/PERICOLACE) 8.6-50 MG tablet 6/10/2021 at [address] [facility] No Yes   Sig: Take 1 tablet by mouth  2 times daily as needed for constipation      Facility-Administered Medications: None     Allergies   Allergies   Allergen Reactions     Lipitor [Hmg-Coa-R Inhibitors]      Severe leg cramps     Penicillin [Penicillins]      Sulfa Drugs        Physical Exam   Vital Signs: Temp: 98.4  F (36.9  C) Temp src: Axillary BP: (!) 132/95 Pulse: 109   Resp: 22 SpO2: 90 % O2 Device: BiPAP/CPAP    Weight: 100 lbs 1.42 oz    Constitutional: somnolent on BiPAP, elderly and ill appearing.   Eyes: Lids and lashes normal, pupils equal, round and reactive to light   ENT: Normocephalic, without obvious abnormality, atraumatic, sinuses nontender on palpation   Hematologic / Lymphatic: no cervical lymphadenopathy   Respiratory: CTABL   Cardiovascular: Tachycardic with regular rhythm with no m/r/g   GI: Normal bowel sounds, soft, non-distended, non-tender. Skin: normal skin color, texture, turgor   Musculoskeletal: There is no redness, warmth, or swelling of the joints. Full range of motion noted.   Neurologic: encephalopathic, somnolent.   Neuropsychiatric: unable to assess    Data   Data reviewed today: I reviewed all medications, new labs and imaging results over the last 24 hours. I personally reviewed the chest x-ray image(s) showing see below.    CXR  IMPRESSION: No acute cardiopulmonary disease. Hyperinflated lungs can  be seen with emphysema.    Most Recent 3 CBC's:  Recent Labs   Lab Test 06/11/21  1528 06/08/21  0641 06/07/21  0626 06/06/21  0542   WBC 19.6*  --  8.7 9.0   HGB 11.0* 8.4* 8.4* 7.1*     --  95 95   *  --  193 166     Most Recent 3 BMP's:  Recent Labs   Lab Test 06/11/21  1528 06/07/21  0626 06/06/21  0542    140 140   POTASSIUM 4.6 4.1 4.3   CHLORIDE 112* 110* 109   CO2 30 27 27   BUN 44* 31* 31*   CR 1.19* 1.04 1.17*   ANIONGAP 2* 3 4   PATRICIA 10.4* 8.9 8.6   * 108* 114*     Most Recent 2 LFT's:  Recent Labs   Lab Test 06/11/21  1528 11/02/20  1209   AST 35 24   ALT 26 22   ALKPHOS 104 78    BILITOTAL 0.7 0.6     Most Recent 3 INR's:  Recent Labs   Lab Test 06/03/21  2331   INR 0.99     Recent Results (from the past 24 hour(s))   XR Chest Port 1 View    Narrative    XR PORTABLE CHEST ONE VIEW   6/11/2021 3:33 PM     HISTORY: Chest pain      COMPARISON: 11/2/2020.      Impression    IMPRESSION: No acute cardiopulmonary disease. Hyperinflated lungs can  be seen with emphysema.    KRISTOPHER TELLEZ MD

## 2021-06-12 NOTE — PROVIDER NOTIFICATION
MD Notification Genaro Melendrez    Notified Person Name: MD Genaro Melendrez    Notification Date/Time: 6/12/21     Notification Interaction: web based  Paging X 3    Purpose of Notification: low urine output     Orders Received: N/S 500 ML bolus. Start maintenance IV  Fluid after bolus.    Comments: awaiting call back, received call back at 5:56pm

## 2021-06-13 NOTE — PROGRESS NOTES
[facility]    Hospitalist Progress Note    Date of Service (when I saw the patient): 06/12/2021    Assessment & Plan     Rosie Guerrero is a pleasant 89 year old female admitted on 6/11/2021 for evaluation of SOB and cough.  Current problems include:     Acute Hypoxic Resp Failure; improving.  Possible aspiration Pneumonia  - now off of BiPAP  - continuous oximetry  - continue IV Cefepime   - limited NPO; appreciate Speech eval. today  - per Speech:   - frequent oral cares with swabs dipped in thin water; encourage pt to cough and clear secretions.   - Okay for essential meds crushed in honey thick liquids and sips of honey thick ice water for comfort.      Intertrochanteric fracture of L hip on 6/5/2021; wound healing well.  Last admission patient had a fall at facility evening of presentation. No reported head trauma or LOC, unclear if witnessed.  CT head negative. XR pelvis with comminuted intertrochanteric fracture of the L hip. R wrist imaging without fracture. S/p INTERNAL FIXATION, FRACTURE, TROCHANTERIC HIP 06/04.  Per Ortho Surgery:  - WBAT x 6 weeks with walker  - asa 325 mg x 6 week for DVT prophylaxis once able to tolerate PO  - on heparin for now  - follow up in 2 weeks for dressing removal and with orthopedic surgeon in 6 weeks.  - pain control as needed     Blood loss anemia; decrease in Hgb today.   - repeat CBC/platelets 6/13  - check iron studies    Dementia w/ ongoing confusion.  Hx of oropharyngeal dysphagia  - resume dysphagia diet when able  - Maintain normal day/night, sleep wake cycles  - Minimize sedating/altering medications as able     HTN; uncontrolled  Dyslipidemia  - continue Amlodipine 5 mg/day  - add PRN clonidine     H/o CVA  - resume full dose asa 325 mg for DVT prophylaxis once mental status improves     CKD; Cr on admission stable at 1.19  - avoid nephrotoxins  - monitor creatinine    Deconditioning; multifactorial.  - continue PT/OT  "evaluations    Hypernatremia; likely due to poor oral intake.  - repeat IV bolus and then begin maintenance IVF  - repeat BMP 6/13 a.m.      DVT Prophylaxis: Pneumatic Compression Devices  Code Status: No CPR- Do NOT Intubate    Disposition: Expected discharge in 2-3 days.      TRI Melendrez MD, [facility]     Internal Medicine Hospitalist  Text Page (7am - 6pm)    Interval History   Reviewed w/ RN. No new issues today; confused most of the day.  No apparent f/c/SOB; occasional cough, non-productive.  No abdominal Sx; no leg/arm Sx.  Decreased urine output, per RN.    Data reviewed today: I reviewed all new labs and imaging results over the last 24 hours.     Physical Exam   Temp: 98  F (36.7  C) Temp src: Axillary BP: (!) 155/68 Pulse: 76   Resp: 26 SpO2: 96 % O2 Device: Oxymask Oxygen Delivery: 4 LPM  Vitals:    06/11/21 1512 06/12/21 0506   Weight: 45.4 kg (100 lb 1.4 oz) 44.7 kg (98 lb 9.6 oz)     Vital Signs with Ranges  Temp:  [97.5  F (36.4  C)-99.1  F (37.3  C)] 98  F (36.7  C)  Pulse:  [] 76  Resp:  [14-30] 26  BP: ()/() 155/68  SpO2:  [87 %-99 %] 96 %  I/O last 3 completed shifts:  In: 0   Out: 500 [Urine:500]    Constitutional: awake, talking to herself when I enter her room; no apparent distress; lying in bed  HEENT: sclerae clear; MM's dry.  Respiratory: good a/e bilaterally; decreased at bases; no wheezing or rhonchi  Cardiovascular: Regular rate and rhythm, S1, S2 noted; no m/r/g  GI: abdomen flat, + bowel sounds; soft, non-tender, non-distended  Skin/Integumen: incision along Left upper/posterior quad intact; minimal dried blood on gauze; no rashes, no cyanosis, no jaundice  Musculoskeletal: no edema  Neurologic: follows directions well; no focal deficits. Confused: thinks she is \"somewhere in [place]\".      Medications     dextrose 5% and 0.45% NaCl 100 mL/hr at 06/12/21 1922     - MEDICATION INSTRUCTIONS -       - MEDICATION INSTRUCTIONS -         amLODIPine  2.5 mg Oral Daily     " aspirin  325 mg Oral Daily     ceFEPIme (MAXIPIME) IV  2 g Intravenous Q24H     sodium chloride (PF)  3 mL Intracatheter Q8H       Data   Recent Labs   Lab 06/12/21  1829 06/12/21  0700 06/11/21  1528 06/08/21  0641 06/07/21  0626   WBC  --  16.7* 19.6*  --  8.7   HGB  --  8.8* 11.0* 8.4* 8.4*   MCV  --  98 100  --  95   PLT  --  410 537*  --  193   * 148* 144  --  140   POTASSIUM 4.2 4.5 4.6  --  4.1   CHLORIDE 120* 117* 112*  --  110*   CO2 27 27 30  --  27   BUN 57* 52* 44*  --  31*   CR 1.11* 1.17* 1.19*  --  1.04   ANIONGAP 2* 4 2*  --  3   PATRICIA 9.6 9.9 10.4*  --  8.9   * 132* 147*  --  108*   ALBUMIN  --   --  2.6*  --   --    PROTTOTAL  --   --  7.4  --   --    BILITOTAL  --   --  0.7  --   --    ALKPHOS  --   --  104  --   --    ALT  --   --  26  --   --    AST  --   --  35  --   --    TROPI  --   --  <0.015  --   --

## 2021-06-13 NOTE — PLAN OF CARE
"SLP: RN reported continued concerns with s/sx of aspiration with oral meds. Pt alert in bed and on room air. Improved secretion management today with oral cavity clear and moist. Pt frequently requesting \"fresh water.\" Ice chips x3 with adequate mastication and timing. Very weak, congested cough following trials. Continued requests for ice water. Trialed nectar thick water with continued s/sx of aspiration. Pt stated, \"I'm so tired\" after brief session. Reviewed concern for ongoing aspiration with PO intake and overall concern for nutrition/hydration with fatigue/limited tolerance. Education provided on alternative feeding. When asked if pt would like to consider these options, pt stated, \"No\" and continued to request \"fresh water.\" Discussed with RN.    Continue NPO with essential meds only and sips of nectar thick water for comfort. Continue excellent oral cares with ice water.     Consider Palliative consult/discussion on goals of care. POLST indicates no artificial nutrition. If pt/guardian accept risk of aspiration with PO intake, \"least hazardous\" diet would be dysphagia diet level 1 and nectar thick liquids or thin liquids as pt is requesting normal water. Limited activity tolerance and ability to sit in a chair for a Video Swallow Study to objectively assess. Will continue to follow. MD paged.  "

## 2021-06-13 NOTE — PLAN OF CARE
Disoriented x4. Confused, but pleasant.  Inconsistently follows commands. Restless at times, attempts to pull lines off. VSS, weaned to RA, htn @ times.  Assist of 2. NPO. Lung sounds diminished. Bowel sounds active. Noe in place, adequate UOP. L hip incision NAVNEET. Tele: .

## 2021-06-13 NOTE — PROGRESS NOTES
"[facility]    Hospitalist Progress Note    Date of Service (when I saw the patient): 06/13/2021    Assessment & Plan     Rosie Guerrero is a pleasant 89 year old female admitted on 6/11/2021 for evaluation of SOB and cough.  Current problems include:     Acute Hypoxic Resp Failure; improving.  Possible aspiration Pneumonia  - now off of BiPAP  - continuous oximetry  Plan:  - continue IV Cefepime   - limited NPO; appreciate Speech eval. today  - per Speech -> If pt/guardian accept risk of aspiration with PO intake, \"least hazardous\" diet would be dysphagia diet level 1 and nectar thick liquids or thin liquids  - frequent oral cares with swabs dipped in thin water; encourage pt to cough and clear secretions.   - Okay for essential meds crushed in honey thick liquids and sips of honey thick ice water for comfort.      Intertrochanteric fracture of L hip on 6/5/2021; wound healing well.  Last admission patient had a fall at facility evening of presentation. No reported head trauma or LOC, unclear if witnessed.  CT head negative. XR pelvis with comminuted intertrochanteric fracture of the L hip. R wrist imaging without fracture. S/p INTERNAL FIXATION, FRACTURE, TROCHANTERIC HIP 06/04.  Per Ortho Surgery:  - WBAT x 6 weeks with walker  - asa 325 mg x 6 week for DVT prophylaxis once able to tolerate PO  - on heparin for now  - follow up in 2 weeks for dressing removal and with orthopedic surgeon in 6 weeks.  - pain control as needed     Blood loss anemia; decrease in Hgb today.   - repeat CBC/platelets 6/13  - check iron studies    Dementia w/ ongoing confusion.  Hx of oropharyngeal dysphagia  - resume dysphagia diet when able  - Maintain normal day/night, sleep wake cycles  - Minimize sedating/altering medications as able     HTN; uncontrolled  Dyslipidemia  - continue Amlodipine 5 mg/day  - add PRN clonidine     H/o CVA  - resume full dose asa 325 mg for DVT prophylaxis once mental status " "improves     CKD; Cr on admission stable at 1.19  - avoid nephrotoxins  - monitor creatinine    Deconditioning; multifactorial.  - continue PT/OT evaluations    Hypernatremia; likely due to poor oral intake.  - repeat IV bolus and then begin maintenance IVF  - repeat BMP 6/13 a.m.      DVT Prophylaxis: Pneumatic Compression Devices  Code Status: No CPR- Do NOT Intubate    Disposition: Expected discharge in 2-3 days.    CHERRY NOLEN MD  [facility]      Interval History      No new issues today  No fevers or chills  No apparent f/c/SOB; occasional cough, non-productive.  No abdominal Sx; no leg/arm Sx.     Data reviewed today: I reviewed all new labs and imaging results over the last 24 hours.     Physical Exam   Temp: 98.3  F (36.8  C) Temp src: Oral BP: (!) 164/122 Pulse: 78   Resp: 20 SpO2: 96 % O2 Device: Oxymask Oxygen Delivery: 3 LPM  Vitals:    06/11/21 1512 06/12/21 0506 06/13/21 0637   Weight: 45.4 kg (100 lb 1.4 oz) 44.7 kg (98 lb 9.6 oz) 44.5 kg (98 lb 1.7 oz)     Vital Signs with Ranges  Temp:  [98  F (36.7  C)-98.4  F (36.9  C)] 98.3  F (36.8  C)  Pulse:  [65-80] 78  Resp:  [17-61] 20  BP: (104-168)/() 164/122  SpO2:  [89 %-96 %] 96 %  I/O last 3 completed shifts:  In: 1153.33 [P.O.:190; I.V.:963.33]  Out: 900 [Urine:900]    Constitutional: awake, talking to herself when I enter her room; no apparent distress; lying in bed  Respiratory: good a/e bilaterally; decreased at bases; no wheezing or rhonchi  Cardiovascular: Regular rate and rhythm, S1, S2 noted; no m/r/g  GI: abdomen flat, + bowel sounds; soft, non-tender, non-distended  Skin/Integumen: incision along Left upper/posterior quad intact; minimal dried blood on gauze; no rashes, no cyanosis, no jaundice  Musculoskeletal: no edema  Neurologic: follows directions well; no focal deficits. Confused: thinks she is \"somewhere in [place]\".      Medications     dextrose 5% and 0.45% NaCl 100 mL/hr at 06/13/21 0634     - MEDICATION " INSTRUCTIONS -       - MEDICATION INSTRUCTIONS -         amLODIPine  2.5 mg Oral Daily     aspirin  325 mg Oral Daily     ceFEPIme (MAXIPIME) IV  2 g Intravenous Q24H     sodium chloride (PF)  3 mL Intracatheter Q8H     White Petrolatum   Topical Q2H While awake       Data   Recent Labs   Lab 06/13/21  0611 06/12/21  1829 06/12/21  0700 06/11/21  1528   WBC 12.3*  --  16.7* 19.6*   HGB 8.1*  --  8.8* 11.0*     --  98 100     --  410 537*   * 149* 148* 144   POTASSIUM 3.8 4.2 4.5 4.6   CHLORIDE 119* 120* 117* 112*   CO2 25 27 27 30   BUN 48* 57* 52* 44*   CR 1.02 1.11* 1.17* 1.19*   ANIONGAP 4 2* 4 2*   PATRICIA 9.7 9.6 9.9 10.4*   * 116* 132* 147*   ALBUMIN  --   --   --  2.6*   PROTTOTAL  --   --   --  7.4   BILITOTAL  --   --   --  0.7   ALKPHOS  --   --   --  104   ALT  --   --   --  26   AST  --   --   --  35   TROPI  --   --   --  <0.015

## 2021-06-14 NOTE — PROGRESS NOTES
"[facility]    Hospitalist Progress Note    Date of Service (when I saw the patient): 06/14/2021    Assessment & Plan     Rosie Guerrero is a pleasant 89 year old female admitted on 6/11/2021 for evaluation of SOB and cough.  Current problems include:     Acute Hypoxic Resp Failure; improving.   Aspiration Pneumonia  - now off of BiPAP  - continuous oximetry  - healthcare agent would like comfort cares.  Plan:  - continue IV Cefepime   - Dysphagia diet  - per Speech -> If pt/guardian accept risk of aspiration with PO intake, \"least hazardous\" diet would be dysphagia diet level 1 and nectar thick liquids or thin liquids  - SW consulted for hospice  - frequent oral cares with swabs dipped in thin water; encourage pt to cough and clear secretions.   - Okay for essential meds crushed in honey thick liquids and sips of honey thick ice water for comfort.      Intertrochanteric fracture of L hip on 6/5/2021; wound healing well.  Last admission patient had a fall at facility evening of presentation. No reported head trauma or LOC, unclear if witnessed.  CT head negative. XR pelvis with comminuted intertrochanteric fracture of the L hip. R wrist imaging without fracture. S/p INTERNAL FIXATION, FRACTURE, TROCHANTERIC HIP 06/04.  Per Ortho Surgery:  - WBAT x 6 weeks with walker  - asa 325 mg x 6 week for DVT prophylaxis once able to tolerate PO  - on heparin for now  - follow up in 2 weeks for dressing removal and with orthopedic surgeon in 6 weeks.  - pain control as needed     Blood loss anemia; decrease in Hgb today.   - repeat CBC/platelets 6/13  - check iron studies    Dementia w/ ongoing confusion.  Hx of oropharyngeal dysphagia  - resume dysphagia diet when able  - Maintain normal day/night, sleep wake cycles  - Minimize sedating/altering medications as able     HTN; uncontrolled  Dyslipidemia  - continue Amlodipine 5 mg/day  - add PRN clonidine     H/o CVA  - resume full dose asa 325 mg for DVT " "prophylaxis once mental status improves     CKD; Cr on admission stable at 1.19  - avoid nephrotoxins  - monitor creatinine    Deconditioning; multifactorial.  - continue PT/OT evaluations    Hypernatremia; likely due to poor oral intake.  - repeat IV bolus and then begin maintenance IVF  - repeat BMP 6/13 a.m.      DVT Prophylaxis: Pneumatic Compression Devices  Code Status: No CPR- Do NOT Intubate    Disposition: Expected discharge in 2-3 days.    CHERRY NOLEN MD  [facility]      Interval History      No new issues today  Healthcare agent would like to proceed with hospice  No fevers or chills  No apparent f/c/SOB  Now on RA  No abdominal Sx; no leg/arm Sx.     Data reviewed today: I reviewed all new labs and imaging results over the last 24 hours.     Physical Exam   Temp: 97.5  F (36.4  C) Temp src: Oral BP: (!) 158/60 Pulse: 60   Resp: 16 SpO2: 98 % O2 Device: None (Room air)    Vitals:    06/12/21 0506 06/13/21 0637 06/14/21 0600   Weight: 44.7 kg (98 lb 9.6 oz) 44.5 kg (98 lb 1.7 oz) 44.5 kg (98 lb 1.7 oz)     Vital Signs with Ranges  Temp:  [97.5  F (36.4  C)-98  F (36.7  C)] 97.5  F (36.4  C)  Pulse:  [60-65] 60  Resp:  [16-24] 16  BP: (123-175)/(60-93) 158/60  SpO2:  [97 %-99 %] 98 %  I/O last 3 completed shifts:  In: 2748.34 [P.O.:200; I.V.:2548.34]  Out: 1425 [Urine:1425]    Constitutional: awake, talking to herself when I enter her room; no apparent distress; lying in bed  Respiratory: good a/e bilaterally; decreased at bases; no wheezing or rhonchi  Cardiovascular: Regular rate and rhythm, S1, S2 noted; no m/r/g  GI: abdomen flat, + bowel sounds; soft, non-tender, non-distended  Skin/Integumen: incision along Left upper/posterior quad intact; minimal dried blood on gauze; no rashes, no cyanosis, no jaundice  Musculoskeletal: no edema  Neurologic: follows directions well; no focal deficits. Confused: thinks she is \"somewhere in [place]\".      Medications     dextrose 5% and 0.45% NaCl " 100 mL/hr at 06/14/21 1639     - MEDICATION INSTRUCTIONS -       - MEDICATION INSTRUCTIONS -         amLODIPine  2.5 mg Oral Daily     aspirin  325 mg Oral Daily     ceFEPIme (MAXIPIME) IV  2 g Intravenous Q24H     sodium chloride (PF)  3 mL Intracatheter Q8H     White Petrolatum   Topical Q2H While awake       Data   Recent Labs   Lab 06/13/21  0611 06/12/21  1829 06/12/21  0700 06/11/21  1528   WBC 12.3*  --  16.7* 19.6*   HGB 8.1*  --  8.8* 11.0*     --  98 100     --  410 537*   * 149* 148* 144   POTASSIUM 3.8 4.2 4.5 4.6   CHLORIDE 119* 120* 117* 112*   CO2 25 27 27 30   BUN 48* 57* 52* 44*   CR 1.02 1.11* 1.17* 1.19*   ANIONGAP 4 2* 4 2*   PATRICIA 9.7 9.6 9.9 10.4*   * 116* 132* 147*   ALBUMIN  --   --   --  2.6*   PROTTOTAL  --   --   --  7.4   BILITOTAL  --   --   --  0.7   ALKPHOS  --   --   --  104   ALT  --   --   --  26   AST  --   --   --  35   TROPI  --   --   --  <0.015

## 2021-06-14 NOTE — PROGRESS NOTES
Patient oriented to self, vss, a-febrile, unable to follow commands h/o dementia, c/o left hip pain with activity s/p ORIF left hip pod # 10, incision CDI, no drainage, open to air, cms intact, patient is Npo, failed swallow study, hooks in place and patent, turn and reposition every 2 hrs, up to the chair with A2 GB and walker, PT/SLP following.

## 2021-06-14 NOTE — PROGRESS NOTES
SW called and spoke with [facility].  Pt is a resident of their long term care unit.

## 2021-06-14 NOTE — PLAN OF CARE
IMC status discontinued. D/O x4, demented. Pleasantly confused, but can be restless at times. VSS on RA, hypertensive at times, PRN Clonidine given. Assist of 2, lift. NPO d/t r/f aspiration. Sips of nectar thick liquids for essential meds and comfort. Oral cares provided. Lung sounds diminished. Bowel sounds active, no BM. Adequate urine output via hooks. Skin intact. L hip incision from hip surgery NAVNEET. Moaning and complaining of pain, Tylenol given.

## 2021-06-14 NOTE — PLAN OF CARE
Patient is disoriented x 4, up with assistance of two, does not follow commands. Vital signs stable  on 3 L of oxygen via  Oxymask, except hypertension PRN catapres  administered. Lung sounds diminished. Bowel sounds active. Noe in place with adequate output, patient pull off lines and BP cuff. Evaluated today by SLP recommendation to  continue NPO with essential meds only and sips of nectar thick water for comfort. Will continue to monitor.

## 2021-06-15 NOTE — PLAN OF CARE
Alert to self, confused. Denies pain. T&R maintained. IV abx continued. Oral cares provided, oral suctioning after. Intermittent weak, dry cough. VSS on RA. NPO. Pt on comfort cares. Continue to monitor.

## 2021-06-15 NOTE — CONSULTS
Care Management Initial Consult    General Information  Assessment completed with: VM-chart review,    Type of CM/SW Visit: Initial Assessment    Primary Care Provider verified and updated as needed: Yes   Readmission within the last 30 days: previous discharge plan unsuccessful      Reason for Consult: discharge planning  Advance Care Planning: Advance Care Planning Reviewed: other (comment)          Communication Assessment  Patient's communication style: spoken language (English or Bilingual)             Cognitive  Cognitive/Neuro/Behavioral: .WDL except  Level of Consciousness: confused  Arousal Level: opens eyes spontaneously  Orientation: situation, time  Mood/Behavior: calm, cooperative  Best Language: 0 - No aphasia  Speech: slow    Living Environment:   People in home: alone, facility resident     Current living Arrangements: other (see comments)([facility] )      Able to return to prior arrangements:         Family/Social Support:  Care provided by: other (see comments)(Facility staff)  Provides care for: no one  Marital Status: Single  Facility resident(s)/Staff          Description of Support System: Involved, Supportive    Support Assessment: Adequate family and caregiver support    Current Resources:   Patient receiving home care services: No     Community Resources: None  Equipment currently used at home: walker, rolling  Supplies currently used at home:      Employment/Financial:  Employment Status: retired        Financial Concerns: No concerns identified   Referral to Financial Counselor: No       Lifestyle & Psychosocial Needs:        Socioeconomic History     Marital status:      Spouse name: Not on file     Number of children: 0     Years of education: Not on file     Highest education level: Not on file   Occupational History     Employer: RETIRED     Tobacco Use     Smoking status: Former Smoker     Quit date: 1974     Years since quittin.4     Smokeless tobacco:  Never Used   Substance and Sexual Activity     Alcohol use: Yes     Alcohol/week: 0.0 standard drinks     Comment: occassionally     Drug use: No     Sexual activity: Not Currently       Functional Status:  Prior to admission patient needed assistance:              Mental Health Status:          Chemical Dependency Status:                Values/Beliefs:  Spiritual, Cultural Beliefs, Gnosticism Practices, Values that affect care: no               Additional Information:  SW informed by provider that pt would do best if they can discharge back to LTC today or early tomorrow. Provider reports if pt could then enroll into hospice it would be helpful. SW called pt's health care agent, Nicki who was agreeable tot he process. SW left a VM with [facility] admission regarding discharge.     EMMA Ho

## 2021-06-15 NOTE — PLAN OF CARE
Speech-Language Pathology      Attempted to see for dysphagia therapy x2, however pt refused during initial attempt this AM. She is currently on O2 via Oxymask with frequent weak cough and congested, wet vocal quality. Will defer intervention at this time and re-attempt as able/appropriate.     Speech Language Therapy Discharge Summary    Reason for therapy discharge:    Patient/family request discontinuation of services.  Change in medical status.    Progress towards therapy goal(s). See goals on Care Plan in [facility] electronic health record for goal details.  Goals not met.  Barriers to achieving goals:   comfort care.    Therapy recommendation(s):    No further therapy is recommended.  Diet per MD

## 2021-06-15 NOTE — SIGNIFICANT EVENT
"Approximately 1300 this RN went into the pt's room to feed her lunch.  This RN fed her 2 bites of food, and pt began coughing to appear to \"clear her throat\" and was not able to clear her secretions.  RT called to come help suction the pt.  RT at the bedside and suctioned the pt, RT then  wanted an RRT.  NP here and new orders received to make the pt comfort cares.  See the NP notes for details.  "

## 2021-06-15 NOTE — PLAN OF CARE
Current Problem: Altered mental status  Management Provided: Reoriented to the environment. Provided calm environment. Increased visualization. Explained procedures.  Patient response: Verbalized calm and cooperative. Rested well during the night.

## 2021-06-15 NOTE — PROGRESS NOTES
"[facility]    Hospitalist Progress Note    Date of Service (when I saw the patient): 06/15/2021    Assessment & Plan     Rosie Guerrero is a pleasant 89 year old female admitted on 6/11/2021 for evaluation of SOB and cough.  Current problems include:     Acute Hypoxic Resp Failure; improving.   Aspiration Pneumonia  - now off of BiPAP  - continuous oximetry  - healthcare agent would like comfort cares.  Plan:  - continue IV Cefepime continued otherwise ASA / Norvasc stopped  - Comfort cares  - per Speech -> If pt/guardian accept risk of aspiration with PO intake, \"least hazardous\" diet would be dysphagia diet level 1 and nectar thick liquids or thin liquids  -  consulted for hospice     Intertrochanteric fracture of L hip on 6/5/2021; wound healing well.  Last admission patient had a fall at facility evening of presentation. No reported head trauma or LOC, unclear if witnessed.  CT head negative. XR pelvis with comminuted intertrochanteric fracture of the L hip. R wrist imaging without fracture. S/p INTERNAL FIXATION, FRACTURE, TROCHANTERIC HIP 06/04.  Plan:  - Comfort cares     Blood loss anemia; decrease in Hgb today.   - repeat CBC/platelets 6/13  - check iron studies    Dementia w/ ongoing confusion.  Hx of oropharyngeal dysphagia  - resume dysphagia diet when able  - Maintain normal day/night, sleep wake cycles  - Minimize sedating/altering medications as able     HTN; uncontrolled  Dyslipidemia  Comfort cares     H/o CVA  Comfort cares    CKD; Cr on admission stable at 1.19    Deconditioning; multifactorial.  Hypernatremia; likely due to poor oral intake. Now on comfort cares      DVT Prophylaxis: Pneumatic Compression Devices  Code Status: No CPR- Do NOT Intubate    Disposition: Expected discharge in 2-3 days.    CHERRY NOLEN MD  [facility] Hospitalist      Patient, family, interdisciplinary team involved in care and agrees with plan.  Total time - Greater than 35 min. More " than 50% of time spent in direct patient care, care coordination, patient/caregiver counseling, and formalizing plan of care.       Interval History      RRT called after obvious aspiration of a bite of pudding and a bite of pureed entree.  Guardian would like to proceed with comfort cares.  No fevers or chills  No apparent f/c/SOB  Now on RA  No abdominal Sx; no leg/arm Sx.     Data reviewed today: I reviewed all new labs and imaging results over the last 24 hours.     Physical Exam   Temp: 98  F (36.7  C) Temp src: Oral BP: (!) 171/81 Pulse: 73   Resp: 18 SpO2: 96 % O2 Device: None (Room air) Oxygen Delivery: 5 LPM  Vitals:    06/13/21 0637 06/14/21 0600 06/15/21 0610   Weight: 44.5 kg (98 lb 1.7 oz) 44.5 kg (98 lb 1.7 oz) 46.6 kg (102 lb 11.8 oz)     Vital Signs with Ranges  Temp:  [97.1  F (36.2  C)-98.4  F (36.9  C)] 98  F (36.7  C)  Pulse:  [64-73] 73  Resp:  [16-20] 18  BP: (129-171)/(62-81) 171/81  SpO2:  [95 %-97 %] 96 %  I/O last 3 completed shifts:  In: 270 [P.O.:270]  Out: 400 [Urine:400]    Constitutional: no apparent distress  Respiratory: coarse breath sounds.   Cardiovascular: Regular rate and rhythm, S1, S2 noted; no m/r/g  GI: abdomen flat, + bowel sounds; soft, non-tender, non-distended  Skin/Integumen: incision along Left upper/posterior quad intact; minimal dried blood on gauze; no rashes, no cyanosis, no jaundice  Musculoskeletal: no edema  Neurologic: follows directions well; no focal deficits. Confused      Medications     - MEDICATION INSTRUCTIONS -       - MEDICATION INSTRUCTIONS -         ceFEPIme (MAXIPIME) IV  2 g Intravenous Q24H     sodium chloride (PF)  3 mL Intracatheter Q8H     White Petrolatum   Topical Q2H While awake       Data   Recent Labs   Lab 06/15/21  0718 06/13/21  0611 06/12/21  1829 06/12/21  0700 06/11/21  1528   WBC 13.9* 12.3*  --  16.7* 19.6*   HGB 8.2* 8.1*  --  8.8* 11.0*   MCV 97 100  --  98 100    425  --  410 537*    148* 149* 148* 144   POTASSIUM  3.7 3.8 4.2 4.5 4.6   CHLORIDE 114* 119* 120* 117* 112*   CO2 24 25 27 27 30   BUN 23 48* 57* 52* 44*   CR 0.89 1.02 1.11* 1.17* 1.19*   ANIONGAP 4 4 2* 4 2*   PATRICIA 9.2 9.7 9.6 9.9 10.4*   GLC 93 133* 116* 132* 147*   ALBUMIN  --   --   --   --  2.6*   PROTTOTAL  --   --   --   --  7.4   BILITOTAL  --   --   --   --  0.7   ALKPHOS  --   --   --   --  104   ALT  --   --   --   --  26   AST  --   --   --   --  35   TROPI  --   --   --   --  <0.015

## 2021-06-15 NOTE — PLAN OF CARE
Pt's (L) hip incision is approximated and appears to be healing.  Pt is now comfort cares, see previous note.  Pt turned q 2 hours and PRN.  Noe is intact.  Oral care done this shift.  Plans for the pt to be transferred back nursing home tomorrow for possible hospice care.

## 2021-06-16 NOTE — PROGRESS NOTES
1900h-2300h: Patient on comfort cares. Confuse, only alert to self. Patient denies pain. Oral care provided, ensure patient mouth is moist every 2h. Weak cough, unable to clear secretion independently, suctioned oral secretions as needed. NPO. Maintained HOB at 30 degrees. Repositioned every 2h w/ assist of 2. Noe F16 in placed, draining yellow urine.  Denies pain.

## 2021-06-16 NOTE — PROGRESS NOTES
Care Management Discharge Note    Discharge Date: 06/16/21(return LTC)  Expected Time of Departure: 1500    Discharge Disposition: Long Term Care    Discharge Services: None    Discharge DME: None    Discharge Transportation: agency([facility])    Private pay costs discussed: Not applicable    PAS Confirmation Code:    Patient/family educated on Medicare website which has current facility and service quality ratings: yes    Education Provided on the Discharge Plan:    Persons Notified of Discharge Plans: Pt's emergency contact Rosie, [facility] admissions, bedside nurse  Patient/Family in Agreement with the Plan: yes    Handoff Referral Completed: Yes    Additional Information:  NILE paged hospitalist who reports pt can discharge back to [facility] LT on comfort care. NILE placed call to Aminah in admissions at [facility] and updated her on the plans for discharge. Aminah gave the discharge information to their LTC coordinator. NILE also informed Aminah pt would work towards enrolling into hospice once admitted to LTC. Call from Carmen, LTC care coordinator at [facility]. Carmen requested the orders be faxed to her at [phone]. NILE faxed the orders. No PAS needed since pt is returning to their LTC placement.      Call from Carmen requesting the hospice order is added to pt's discharge orders. NILE paged hospitalist to make this happen. NILE called Carmen to inform her SW had received her VM. NILE received the update orders with the hospice order placed. NILE faxed the orders to Carmen.     EMMA Ho

## 2021-06-16 NOTE — PLAN OF CARE
PT: Pt now on comfort cares. Note plan to discharge likely today to return to LTC per chart.    Physical Therapy Discharge Summary    Reason for therapy discharge:    Transitioned to comfort cares    Progress towards therapy goal(s). See goals on Care Plan in [facility] electronic health record for goal details.  Goals not met.  Barriers to achieving goals:   limited tolerance for therapy and transition to comfort cares.    Therapy recommendation(s):    No further therapy is recommended.  Patient will need Ax2 for bed mobility and lift for transfers.

## 2021-06-16 NOTE — PLAN OF CARE
Alert and oriented to self only. Comfort cares. Assist of 2. NPO. Lung sounds diminished with fine crackels. Bowel sounds active, passing flatus, no BM during shift, hooks in place, adequate urine output. L hip incision, CDI. Pain managed with PRN sublingual morphine. Denies nausea. No IV access due to pt removing IV- comfort cares and confused- did not attempt to place new IV. Turn and repo q2.

## 2021-06-16 NOTE — DISCHARGE SUMMARY
"[facility]  Hospitalist Discharge Summary      Date of Admission:  6/11/2021  Date of Discharge:  6/16/2021  Discharging Provider: Yunior Avalos MD      Discharge Diagnoses     Aspiration Pneumonia  Acute Hypoxic Respiratory Failure    Follow-ups Needed After Discharge   Follow-up Appointments     Follow-up and recommended labs and tests       Follow up with primary care provider, Deena Main, within 7 days for   hospital follow- up.  The following labs/tests are recommended: None.           Unresulted Labs Ordered in the Past 30 Days of this Admission     Date and Time Order Name Status Description    6/11/2021 1509 Blood culture Preliminary     6/11/2021 1509 Blood culture Preliminary           Discharge Disposition     Discharged to long-term care facility  Condition at discharge: Stable    Hospital Course   Rosie Guerrero is a pleasant 89 year old female admitted on 6/11/2021 for evaluation of SOB and cough.  Current problems include:     Acute Hypoxic Resp Failure; improving.   Aspiration Pneumonia  - now off of BiPAP  - continuous oximetry  - healthcare agent would like comfort cares.  Plan:  - Augmentin at discharge   - otherwise ASA / Norvasc stopped  - Comfort cares  - per Speech -> If pt/guardian accept risk of aspiration with PO intake, \"least hazardous\" diet would be dysphagia diet level 1 and nectar thick liquids or thin liquids  - SW consulted for hospice upon returning to LTC     Intertrochanteric fracture of L hip on 6/5/2021; wound healing well.  Last admission patient had a fall at facility evening of presentation. No reported head trauma or LOC, unclear if witnessed.  CT head negative. XR pelvis with comminuted intertrochanteric fracture of the L hip. R wrist imaging without fracture. S/p INTERNAL FIXATION, FRACTURE, TROCHANTERIC HIP 06/04.  Plan:  - Comfort cares     Blood loss anemia; comfort cares    Dementia w/ ongoing confusion.  Hx of oropharyngeal dysphagia  - " resume dysphagia diet  - Maintain normal day/night, sleep wake cycles  - Minimize sedating/altering medications as able     HTN; uncontrolled  Dyslipidemia  Comfort cares     H/o CVA  Comfort cares    CKD; Cr on admission stable at 1.19    Deconditioning; multifactorial.  Hypernatremia; likely due to poor oral intake. Now on comfort cares      DVT Prophylaxis: Pneumatic Compression Devices  Code Status: No CPR- Do NOT Intubate    Disposition: Expected discharge in 2-3 days.    YUNIOR NOLEN MD  [facility] Hospitalist      Patient, family, interdisciplinary team involved in care and agrees with plan.  Total time - Greater than 35 min. More than 50% of time spent in direct patient care, care coordination, patient/caregiver counseling, and formalizing plan of care.         Consultations This Hospital Stay   SPEECH LANGUAGE PATH ADULT IP CONSULT  PHYSICAL THERAPY ADULT IP CONSULT  OCCUPATIONAL THERAPY ADULT IP CONSULT  SOCIAL WORK IP CONSULT  CARE MANAGEMENT / SOCIAL WORK IP CONSULT    Code Status   No CPR- Do NOT Intubate    Time Spent on this Encounter   I, Yunior Nolen MD, personally saw the patient today and spent greater than 30 minutes discharging this patient.       Yunior Nolen MD  [place] [address] [facility]  [address] [place] [address]  Phone: [phone]  ______________________________________________________________________    Physical Exam   Vital Signs: Temp: 98.4  F (36.9  C) Temp src: Oral BP: (!) 141/66 Pulse: 76   Resp: 18 SpO2: 100 % O2 Device: None (Room air)    Weight: 102 lbs 11.75 oz    Primary Care Physician   Deena Main    Discharge Orders      CARE COORDINATION REFERRAL      Reason for your hospital stay    You had shortness of breath from pneumonia.     Follow-up and recommended labs and tests     Follow up with primary care provider, Deena Main, within 7 days for hospital follow- up.  The following labs/tests are recommended: None.      Activity    Your activity upon discharge: activity as tolerated     Discharge Instructions    ADMIT TO HOSPICE     No CPR- Do NOT Intubate     Diet    Follow this diet upon discharge: Orders Placed This Encounter      Dysphagia Level 1 - Nectar Thick       Significant Results and Procedures   Most Recent 3 CBC's:  Recent Labs   Lab Test 06/15/21  0718 06/13/21  0611 06/12/21  0700   WBC 13.9* 12.3* 16.7*   HGB 8.2* 8.1* 8.8*   MCV 97 100 98    425 410     Most Recent 3 BMP's:  Recent Labs   Lab Test 06/15/21  0718 06/13/21  0611 06/12/21  1829    148* 149*   POTASSIUM 3.7 3.8 4.2   CHLORIDE 114* 119* 120*   CO2 24 25 27   BUN 23 48* 57*   CR 0.89 1.02 1.11*   ANIONGAP 4 4 2*   PATRICIA 9.2 9.7 9.6   GLC 93 133* 116*     Most Recent 2 LFT's:  Recent Labs   Lab Test 06/11/21  1528 11/02/20  1209   AST 35 24   ALT 26 22   ALKPHOS 104 78   BILITOTAL 0.7 0.6     Most Recent 3 INR's:  Recent Labs   Lab Test 06/03/21  2331   INR 0.99     Most Recent 3 Troponin's:  Recent Labs   Lab Test 06/11/21  1528 06/03/21  2331 11/02/20  1209   TROPI <0.015 <0.015 0.020     Most Recent D-dimer:No lab results found.,   Results for orders placed or performed during the hospital encounter of 06/11/21   XR Chest Port 1 View    Narrative    XR PORTABLE CHEST ONE VIEW   6/11/2021 3:33 PM     HISTORY: Chest pain      COMPARISON: 11/2/2020.      Impression    IMPRESSION: No acute cardiopulmonary disease. Hyperinflated lungs can  be seen with emphysema.    KRISTOPHER TELLEZ MD       Discharge Medications   Discharge Medication List as of 6/16/2021  2:22 PM      START taking these medications    Details   haloperidol (HALDOL) 0.5 MG tablet Take 1-2 tablets (0.5-1 mg) by mouth, place under tongue or insert rectally every 6 hours as needed for agitation (nausea), Disp-30 tablet, R-1, Local Print      levofloxacin (LEVAQUIN) 500 MG tablet Take 1 tablet (500 mg) by mouth daily for 7 days, Disp-7 tablet, R-0, E-Prescribe      MEDICATION  INSTRUCTION If care facility cannot accept or use ranges, facility is instructed to use lower end of dosing range, No Print Out         CONTINUE these medications which have CHANGED    Details   acetaminophen (TYLENOL) 325 MG tablet Take 1-2 tablets (325-650 mg) by mouth every 4 hours as needed for mild pain or fever, Disp-30 tablet, R-0, Local Print      guaiFENesin (ROBITUSSIN) 100 MG/5ML SYRP Take 20 mLs by mouth every 4 hours as needed for cough, Transitional         CONTINUE these medications which have NOT CHANGED    Details   Menthol, Topical Analgesic, (ICY HOT EX) %5 cream apply to L hip incisional area for pain, Historical      senna-docusate (SENOKOT-S/PERICOLACE) 8.6-50 MG tablet Take 1 tablet by mouth 2 times daily as needed for constipation, Disp-60 tablet, R-0, Local Print         STOP taking these medications       amLODIPine (NORVASC) 2.5 MG tablet Comments:   Reason for Stopping:         aspirin (ASA) 325 MG EC tablet Comments:   Reason for Stopping:         calcium carbonate (TUMS) 500 MG chewable tablet Comments:   Reason for Stopping:         ferrous sulfate 300 (60 Fe) MG/5ML syrup Comments:   Reason for Stopping:         multivitamin w/minerals (THERA-VIT-M) tablet Comments:   Reason for Stopping:             Allergies   Allergies   Allergen Reactions     Lipitor [Hmg-Coa-R Inhibitors]      Severe leg cramps     Penicillin [Penicillins]      Sulfa Drugs

## 2021-06-16 NOTE — PROGRESS NOTES
Care Management Follow Up    Length of Stay (days): 5    Expected Discharge Date: 06/16/21(return LTC)     Concerns to be Addressed: discharge planning  Pt and health care agent would like to see pt back at [facility]. Provider reports pt should discharge today if possible and enroll into hopsice once arrived at [facility].  Patient plan of care discussed at interdisciplinary rounds: Yes    Anticipated Discharge Disposition: Long Term Care  Disposition Comments: Pt to discharge back to their LTC placement  Anticipated Discharge Services: None  Anticipated Discharge DME: None    Patient/family educated on Medicare website which has current facility and service quality ratings: yes  Education Provided on the Discharge Plan:    Patient/Family in Agreement with the Plan: yes    Referrals Placed by CM/NILE: Transportation  Private pay costs discussed: Not applicable    Additional Information:  Paged hospitalist to inquire if pt was ready for discharge. SW received a call back getting the okayfor pt to discharge.        EMMA Ho

## 2021-06-16 NOTE — PLAN OF CARE
Comfort cares. Alert to self & forgetful @ times. Noe in place. Denies pain. Turn & repo q2hrs. Oral cares done. Pt discharge with transport back to long term care facility on hospice.

## 2021-06-17 NOTE — TELEPHONE ENCOUNTER
[facility] NON-EMERGENT VOICEMAIL ENCOUNTER    Rosie Guerrero is a 89 year old  (1928), Voicemail Message received today regarding:   Nurse called to report that patient passed away around 1:50 pm.  Patient was on [facility] hospice services.        Requests    Order given to release the body to  home.       Electronically signed by:   Nikky Sheikh RN

## 2021-06-17 NOTE — LETTER
6/17/2021        RE: Rosie Guerrero  [facility]  [address]  [facility] [address]        Rosie Guerrero is a 89 year old female seen June 17, 2021 at [facility] TCU where she was readmitted after [facility] hospitalization 6/11-16 or acute hypoxic respiratory failure requiring BiPAP, and secondary to aspiration pneumonia.  Pt was treated with abx and improved, then had another aspiration event on Dinora 15.   Decision was made to transition to comfort care and she returned to TCU on Hospice.   Pt had a fall in early June and hospitalized with a displaced left osteoporotic pathologic intertrochanteric femur fracture, s/p cephalomedullary nailing on 6/4      Today pt is seen in her room, dressed and sitting up in bed.  KEKE present and helping feed breakfast, reports pt was coughing even before she started to take food/drink.   However, pt seems to have an appetite, and especially likes the orange juice, even with thickener in it.  Wet cough persists throughout visit.   Reviewed with Hospice nurse.          By chart review, patient had a stroke in 2017, with ischemic infarcts in the right parieto-occipital lobe on imaging, and high grade stenosis of right external carotid at its origin.  She was seen by Neurology, not a candidate for tPA.  Started on ASA, had Stroke Rehab.   She has longstanding hyperlipidemia, intolerant to all statins.   She is otherwise fairly well, takes few prescription medications, but does take a lot of vitamins and supplements.  Pt was living in AL at [facility], but with cognitive and physical decline, higher care needs.   She was seen in the ED in November 2020 after a fall.   No significant injuries found on imaging, but determined that her AL could no longer meet her needs and she transferred to LTC    Past Medical History:   Diagnosis Date     Generalized osteoarthrosis, unspecified site     Hx of cervicalgia, and sciatica, resolved with PT  "    Macular degeneration (senile) of retina, unspecified      Other and unspecified hyperlipidemia      Other malignant neoplasm of skin of trunk, except scrotum     Multiple Basal cell CA excisions     Personal history of fall 11/2020     Rubella without mention of complication     Past history of Rubella   Cerebral vascular disease, s/p CVI 2017  Late onset dementia    Past Surgical History:   Procedure Laterality Date     FLEX SIG (MEDICARE PT.)  1996    NORMAL     HC COLONOSCOPY THRU STOMA, DIAGNOSTIC  1994    NORMAL     HC REMOVE TONSILS/ADENOIDS,<13 Y/O  1934     HYSTERECTOMY, KEMI  1970    With BSO, secondary to fibroids     OPEN REDUCTION INTERNAL FIXATION HIP NAILING Left 6/4/2021    Procedure: INTERNAL FIXATION, FRACTURE, TROCHANTERIC, HIP, USING PINS OR RODS;  Surgeon: Tesfaye Muñoz MD;  Location:  OR     SH: [facility] 11/[facility], then [facility] to [facility]      Resident of LT, . States \"I had 2 husbands and no children.\"     No siblings.   Her Healthcare Agent is Ash Koo  Past smoker    Review Of Systems  Wt Readings from Last 5 Encounters:   06/17/21 45.4 kg (100 lb)   06/15/21 46.6 kg (102 lb 11.8 oz)   06/01/21 45.4 kg (100 lb)   06/06/21 51.4 kg (113 lb 5 oz)   04/07/21 44 kg (97 lb)      EXAM: very frail, coughing  BP (!) 140/49   Pulse 74   Temp 97.4  F (36.3  C)   Resp 18   Ht 1.727 m (5' 8\")   Wt 45.4 kg (100 lb)   SpO2 92%   BMI 15.20 kg/m     Neck supple without adenopathy  Lungs decreased BS, scattered crackles  Heart RRR s1s2  Abd soft, NT, no distention or guarding, +BS  Ext with edema, severe sarcopenia  Neuro: answers questions okay, briefly  Psych: affect okay       Last Comprehensive Metabolic Panel:  Sodium   Date Value Ref Range Status   06/15/2021 142 133 - 144 mmol/L Final     Potassium   Date Value Ref Range Status   06/15/2021 3.7 3.4 - 5.3 mmol/L Final     Chloride   Date Value Ref Range Status   06/15/2021 114 (H) 94 - 109 mmol/L Final     Carbon " Dioxide   Date Value Ref Range Status   06/15/2021 24 20 - 32 mmol/L Final     Anion Gap   Date Value Ref Range Status   06/15/2021 4 3 - 14 mmol/L Final     Glucose   Date Value Ref Range Status   06/15/2021 93 70 - 99 mg/dL Final     Urea Nitrogen   Date Value Ref Range Status   06/15/2021 23 7 - 30 mg/dL Final     Creatinine   Date Value Ref Range Status   06/15/2021 0.89 0.52 - 1.04 mg/dL Final     GFR Estimate   Date Value Ref Range Status   06/15/2021 56 (L) >60 mL/min/[1.73_m2] Final     Comment:     Non  GFR Calc  Starting 12/18/2018, serum creatinine based estimated GFR (eGFR) will be   calculated using the Chronic Kidney Disease Epidemiology Collaboration   (CKD-EPI) equation.       Calcium   Date Value Ref Range Status   06/15/2021 9.2 8.5 - 10.1 mg/dL Final     Lab Results   Component Value Date    AST 35 06/11/2021      ALBUMIN 2.6 06/11/2021     Lab Results   Component Value Date    PROTTOTAL 7.4 06/11/2021      Lab Results   Component Value Date    ALKPHOS 104 06/11/2021     Lab Results   Component Value Date    WBC 13.9 06/15/2021      HGB 8.2 06/15/2021      MCV 97 06/15/2021       06/15/2021          IMP/PLAN:   (J69.0) Aspiration pneumonia, unspecified aspiration pneumonia type, unspecified laterality, unspecified part of lung (H)    (R13.10) Dysphagia, unspecified type  Comment: continual coughing on modified diet    Plan: talked with Hospice nurse, will try drying agent to decrease secretions: start Atrovent nebs, atropine.      (A57.775H) Closed nondisplaced intertrochanteric fracture of left femur with routine healing, subsequent encounter  Comment: s/p IM nailing    Plan: pain management with Hospice prns     (D62) Anemia associated with acute blood loss  Comment: low hgb post op  Plan: no rechecks, comfort focus    (I67.9) Cerebral vascular disease    (Z86.73) History of CVA (cerebrovascular accident)  Comment: right parieto-occipital ischemic stroke in 2017      Seen  by Neurology at the time and no further workup recommended.     Plan: daily ASA for secondary prevention    (N18.31) Stage 3a chronic kidney disease  Comment: may be secondary to HTN  BP Readings from Last 3 Encounters:   06/17/21 (!) 140/49   06/16/21 (!) 141/66   06/08/21 (!) 157/70      (H35.30) Macular degeneration, unspecified laterality, unspecified type  Comment: very poor vision     Plan: supportive care, AREDS       (F03.90) Dementia without behavioral disturbance, unspecified dementia type (H)  Comment: significant decline in functional status   Plan: LTC support for meds, meals, activity       (R63.4) Weight loss  Comment: declining weight over past few years     Plan: preferences, nutritional supplements as tolerated       Gwendolyn Segura MD         Sincerely,        Gwendolyn Segura MD

## 2021-06-18 NOTE — PROGRESS NOTES
Rosie Guerrero is a 89 year old female seen June 17, 2021 at [facility] TCU where she was readmitted after [facility] hospitalization 6/11-16 or acute hypoxic respiratory failure requiring BiPAP, and secondary to aspiration pneumonia.  Pt was treated with abx and improved, then had another aspiration event on Dinora 15.   Decision was made to transition to comfort care and she returned to TCU on Hospice.   Pt had a fall in early June and hospitalized with a displaced left osteoporotic pathologic intertrochanteric femur fracture, s/p cephalomedullary nailing on 6/4      Today pt is seen in her room, dressed and sitting up in bed.  KEKE present and helping feed breakfast, reports pt was coughing even before she started to take food/drink.   However, pt seems to have an appetite, and especially likes the orange juice, even with thickener in it.  Wet cough persists throughout visit.   Reviewed with Hospice nurse.          By chart review, patient had a stroke in 2017, with ischemic infarcts in the right parieto-occipital lobe on imaging, and high grade stenosis of right external carotid at its origin.  She was seen by Neurology, not a candidate for tPA.  Started on ASA, had Stroke Rehab.   She has longstanding hyperlipidemia, intolerant to all statins.   She is otherwise fairly well, takes few prescription medications, but does take a lot of vitamins and supplements.  Pt was living in AL at [facility], but with cognitive and physical decline, higher care needs.   She was seen in the ED in November 2020 after a fall.   No significant injuries found on imaging, but determined that her AL could no longer meet her needs and she transferred to LTC    Past Medical History:   Diagnosis Date     Generalized osteoarthrosis, unspecified site     Hx of cervicalgia, and sciatica, resolved with PT     Macular degeneration (senile) of retina, unspecified      Other and unspecified hyperlipidemia      Other malignant  "neoplasm of skin of trunk, except scrotum     Multiple Basal cell CA excisions     Personal history of fall 11/2020     Rubella without mention of complication     Past history of Rubella   Cerebral vascular disease, s/p CVI 2017  Late onset dementia    Past Surgical History:   Procedure Laterality Date     FLEX SIG (MEDICARE PT.)  1996    NORMAL     HC COLONOSCOPY THRU STOMA, DIAGNOSTIC  1994    NORMAL     HC REMOVE TONSILS/ADENOIDS,<13 Y/O  1934     HYSTERECTOMY, KEMI  1970    With BSO, secondary to fibroids     OPEN REDUCTION INTERNAL FIXATION HIP NAILING Left 6/4/2021    Procedure: INTERNAL FIXATION, FRACTURE, TROCHANTERIC, HIP, USING PINS OR RODS;  Surgeon: Tesfaye Muñoz MD;  Location:  OR     SH: [facility] until 11/[facility], then moved to LT      Resident of LT, . States \"I had 2 husbands and no children.\"     No siblings.   Her Healthcare Agent is Ash Koo  Past smoker    Review Of Systems  Wt Readings from Last 5 Encounters:   06/17/21 45.4 kg (100 lb)   06/15/21 46.6 kg (102 lb 11.8 oz)   06/01/21 45.4 kg (100 lb)   06/06/21 51.4 kg (113 lb 5 oz)   04/07/21 44 kg (97 lb)      EXAM: very frail, coughing  BP (!) 140/49   Pulse 74   Temp 97.4  F (36.3  C)   Resp 18   Ht 1.727 m (5' 8\")   Wt 45.4 kg (100 lb)   SpO2 92%   BMI 15.20 kg/m     Neck supple without adenopathy  Lungs decreased BS, scattered crackles  Heart RRR s1s2  Abd soft, NT, no distention or guarding, +BS  Ext with edema, severe sarcopenia  Neuro: answers questions okay, briefly  Psych: affect okay       Last Comprehensive Metabolic Panel:  Sodium   Date Value Ref Range Status   06/15/2021 142 133 - 144 mmol/L Final     Potassium   Date Value Ref Range Status   06/15/2021 3.7 3.4 - 5.3 mmol/L Final     Chloride   Date Value Ref Range Status   06/15/2021 114 (H) 94 - 109 mmol/L Final     Carbon Dioxide   Date Value Ref Range Status   06/15/2021 24 20 - 32 mmol/L Final     Anion Gap   Date Value Ref Range Status "   06/15/2021 4 3 - 14 mmol/L Final     Glucose   Date Value Ref Range Status   06/15/2021 93 70 - 99 mg/dL Final     Urea Nitrogen   Date Value Ref Range Status   06/15/2021 23 7 - 30 mg/dL Final     Creatinine   Date Value Ref Range Status   06/15/2021 0.89 0.52 - 1.04 mg/dL Final     GFR Estimate   Date Value Ref Range Status   06/15/2021 56 (L) >60 mL/min/[1.73_m2] Final     Comment:     Non  GFR Calc  Starting 12/18/2018, serum creatinine based estimated GFR (eGFR) will be   calculated using the Chronic Kidney Disease Epidemiology Collaboration   (CKD-EPI) equation.       Calcium   Date Value Ref Range Status   06/15/2021 9.2 8.5 - 10.1 mg/dL Final     Lab Results   Component Value Date    AST 35 06/11/2021      ALBUMIN 2.6 06/11/2021     Lab Results   Component Value Date    PROTTOTAL 7.4 06/11/2021      Lab Results   Component Value Date    ALKPHOS 104 06/11/2021     Lab Results   Component Value Date    WBC 13.9 06/15/2021      HGB 8.2 06/15/2021      MCV 97 06/15/2021       06/15/2021          IMP/PLAN:   (J69.0) Aspiration pneumonia, unspecified aspiration pneumonia type, unspecified laterality, unspecified part of lung (H)    (R13.10) Dysphagia, unspecified type  Comment: continual coughing on modified diet    Plan: talked with Hospice nurse, will try drying agent to decrease secretions: start Atrovent nebs, atropine.      (T21.244D) Closed nondisplaced intertrochanteric fracture of left femur with routine healing, subsequent encounter  Comment: s/p IM nailing    Plan: pain management with Hospice prns     (D62) Anemia associated with acute blood loss  Comment: low hgb post op  Plan: no rechecks, comfort focus    (I67.9) Cerebral vascular disease    (Z86.73) History of CVA (cerebrovascular accident)  Comment: right parieto-occipital ischemic stroke in 2017      Seen by Neurology at the time and no further workup recommended.     Plan: daily ASA for secondary prevention    (N18.31)  Stage 3a chronic kidney disease  Comment: may be secondary to HTN  BP Readings from Last 3 Encounters:   06/17/21 (!) 140/49   06/16/21 (!) 141/66   06/08/21 (!) 157/70      (H35.30) Macular degeneration, unspecified laterality, unspecified type  Comment: very poor vision     Plan: supportive care, AREDS       (F03.90) Dementia without behavioral disturbance, unspecified dementia type (H)  Comment: significant decline in functional status   Plan: LTC support for meds, meals, activity       (R63.4) Weight loss  Comment: declining weight over past few years     Plan: preferences, nutritional supplements as tolerated       Gwendolyn Segura MD

## 2022-10-04 PROBLEM — F03.90 DEMENTIA WITHOUT BEHAVIORAL DISTURBANCE (H): Status: ACTIVE | Noted: 2020-01-01

## 2024-11-18 NOTE — PROGRESS NOTES
Continue home meds   Ortho treatment plan    Aware of patient with left intertrochanteric femur fracture     OR today 6/4 for left intertrochanteric femur fracture  ORIF with Dr. Muñoz  NPO   Bed rest  Prefer pure wick over hooks if at all possible  Pain medication as needed, limit narcotics as able  No PTA anticoagulation, continue without  STAT COVID test  Type and cross  Vit D lab  Pre-op optimization per hospitalist    Formal consult this AM    Breonna Henson PAC

## (undated) DEVICE — ADH SKIN CLOSURE PREMIERPRO EXOFIN 1.0ML 3470

## (undated) DEVICE — GOWN IMPERVIOUS SPECIALTY XL/XLONG 39049

## (undated) DEVICE — SU MONOCRYL 2-0 CT-2 27" Y333H

## (undated) DEVICE — SOL NACL 0.9% IRRIG 1000ML BOTTLE 2F7124

## (undated) DEVICE — WIRE GUIDE 3.2X400MM  357.399

## (undated) DEVICE — CLOSURE SYS SKIN PREMIERPRO EXOFIN FUSION 4X22CM STRL 3472

## (undated) DEVICE — GLOVE PROTEXIS BLUE W/NEU-THERA 8.0  2D73EB80

## (undated) DEVICE — GLOVE PROTEXIS BLUE W/NEU-THERA 7.5  2D73EB75

## (undated) DEVICE — SU MONOCRYL 3-0 PS-2 27" Y427H

## (undated) DEVICE — Device

## (undated) DEVICE — KIT PATIENT CARE HANA TABLE PROFX SUPINE 6855

## (undated) DEVICE — GLOVE PROTEXIS POWDER FREE 7.5 ORTHOPEDIC 2D73ET75

## (undated) DEVICE — SU VICRYL 2-0 CT-1 27" UND J259H

## (undated) DEVICE — SYR 10ML FINGER CONTROL W/O NDL 309695

## (undated) DEVICE — DRAPE C-ARM 60X42" 1013

## (undated) DEVICE — DRSG TEGADERM 4X4 3/4" 1626

## (undated) DEVICE — PACK HIP NAILING SOP15HNSB

## (undated) DEVICE — ESU GROUND PAD UNIVERSAL W/O CORD

## (undated) DEVICE — PREP CHLORAPREP 26ML TINTED ORANGE  260815

## (undated) DEVICE — SU VICRYL 0 CT-1 27" J340H

## (undated) DEVICE — SOL WATER IRRIG 1000ML BOTTLE 2F7114

## (undated) DEVICE — LINEN TOWEL PACK X5 5464

## (undated) DEVICE — DRSG GAUZE 4X4" 3033

## (undated) DEVICE — DRILL BIT QUICK COUPLING 3 FLUTE 4.2MMX330/100MM CALIBRATE

## (undated) DEVICE — NDL 22GA 1.5"

## (undated) DEVICE — GLOVE PROTEXIS POWDER FREE 8.0 ORTHOPEDIC 2D73ET80

## (undated) RX ORDER — PROPOFOL 10 MG/ML
INJECTION, EMULSION INTRAVENOUS
Status: DISPENSED
Start: 2021-01-01

## (undated) RX ORDER — KETAMINE HCL IN NACL, ISO-OSM 100MG/10ML
SYRINGE (ML) INJECTION
Status: DISPENSED
Start: 2021-01-01

## (undated) RX ORDER — DEXAMETHASONE SODIUM PHOSPHATE 4 MG/ML
INJECTION, SOLUTION INTRA-ARTICULAR; INTRALESIONAL; INTRAMUSCULAR; INTRAVENOUS; SOFT TISSUE
Status: DISPENSED
Start: 2021-01-01

## (undated) RX ORDER — FENTANYL CITRATE 50 UG/ML
INJECTION, SOLUTION INTRAMUSCULAR; INTRAVENOUS
Status: DISPENSED
Start: 2021-01-01

## (undated) RX ORDER — EPINEPHRINE 1 MG/ML
INJECTION, SOLUTION INTRAMUSCULAR; SUBCUTANEOUS
Status: DISPENSED
Start: 2021-01-01

## (undated) RX ORDER — ONDANSETRON 2 MG/ML
INJECTION INTRAMUSCULAR; INTRAVENOUS
Status: DISPENSED
Start: 2021-01-01

## (undated) RX ORDER — BUPIVACAINE HYDROCHLORIDE 5 MG/ML
INJECTION, SOLUTION EPIDURAL; INTRACAUDAL
Status: DISPENSED
Start: 2021-01-01

## (undated) RX ORDER — LIDOCAINE HYDROCHLORIDE 20 MG/ML
INJECTION, SOLUTION EPIDURAL; INFILTRATION; INTRACAUDAL; PERINEURAL
Status: DISPENSED
Start: 2021-01-01

## (undated) RX ORDER — CEFAZOLIN SODIUM 2 G/100ML
INJECTION, SOLUTION INTRAVENOUS
Status: DISPENSED
Start: 2021-01-01